# Patient Record
Sex: MALE | Race: BLACK OR AFRICAN AMERICAN | Employment: FULL TIME | URBAN - METROPOLITAN AREA
[De-identification: names, ages, dates, MRNs, and addresses within clinical notes are randomized per-mention and may not be internally consistent; named-entity substitution may affect disease eponyms.]

---

## 2019-09-30 ENCOUNTER — APPOINTMENT (OUTPATIENT)
Dept: GENERAL RADIOLOGY | Age: 44
End: 2019-09-30

## 2019-09-30 ENCOUNTER — HOSPITAL ENCOUNTER (EMERGENCY)
Age: 44
Discharge: HOME | End: 2019-09-30

## 2019-09-30 VITALS
DIASTOLIC BLOOD PRESSURE: 83 MMHG | HEART RATE: 82 BPM | OXYGEN SATURATION: 98 % | SYSTOLIC BLOOD PRESSURE: 142 MMHG | RESPIRATION RATE: 18 BRPM | TEMPERATURE: 98 F

## 2019-09-30 DIAGNOSIS — S46.911A STRAIN OF RIGHT SHOULDER, INITIAL ENCOUNTER: Primary | ICD-10-CM

## 2019-09-30 DIAGNOSIS — S46.001A INJURY OF RIGHT ROTATOR CUFF, INITIAL ENCOUNTER: ICD-10-CM

## 2019-09-30 PROCEDURE — 99283 EMERGENCY DEPT VISIT LOW MDM: CPT

## 2019-09-30 PROCEDURE — 6370000000 HC RX 637 (ALT 250 FOR IP): Performed by: NURSE PRACTITIONER

## 2019-09-30 PROCEDURE — 73030 X-RAY EXAM OF SHOULDER: CPT

## 2019-09-30 RX ORDER — IBUPROFEN 800 MG/1
800 TABLET ORAL EVERY 6 HOURS PRN
Qty: 21 TABLET | Refills: 0 | Status: SHIPPED | OUTPATIENT
Start: 2019-09-30 | End: 2021-09-10

## 2019-09-30 RX ORDER — IBUPROFEN 800 MG/1
800 TABLET ORAL ONCE
Status: COMPLETED | OUTPATIENT
Start: 2019-09-30 | End: 2019-09-30

## 2019-09-30 RX ADMIN — IBUPROFEN 800 MG: 800 TABLET ORAL at 16:42

## 2019-09-30 ASSESSMENT — PAIN SCALES - GENERAL
PAINLEVEL_OUTOF10: 8
PAINLEVEL_OUTOF10: 10

## 2019-10-04 ENCOUNTER — OFFICE VISIT (OUTPATIENT)
Dept: FAMILY MEDICINE CLINIC | Age: 44
End: 2019-10-04

## 2019-10-04 VITALS
OXYGEN SATURATION: 98 % | HEART RATE: 84 BPM | HEIGHT: 74 IN | BODY MASS INDEX: 18.61 KG/M2 | DIASTOLIC BLOOD PRESSURE: 92 MMHG | RESPIRATION RATE: 16 BRPM | WEIGHT: 145 LBS | SYSTOLIC BLOOD PRESSURE: 134 MMHG

## 2019-10-04 DIAGNOSIS — M25.511 CHRONIC RIGHT SHOULDER PAIN: Primary | ICD-10-CM

## 2019-10-04 DIAGNOSIS — G89.29 CHRONIC RIGHT SHOULDER PAIN: Primary | ICD-10-CM

## 2019-10-04 DIAGNOSIS — R03.0 ELEVATED BLOOD PRESSURE READING: ICD-10-CM

## 2019-10-04 PROCEDURE — 99202 OFFICE O/P NEW SF 15 MIN: CPT | Performed by: STUDENT IN AN ORGANIZED HEALTH CARE EDUCATION/TRAINING PROGRAM

## 2019-10-04 ASSESSMENT — ENCOUNTER SYMPTOMS
CONSTIPATION: 0
BACK PAIN: 1
ABDOMINAL PAIN: 0
WHEEZING: 0
BLOOD IN STOOL: 0
VOMITING: 0
SHORTNESS OF BREATH: 0
SINUS PAIN: 0
NAUSEA: 0
COUGH: 0
SORE THROAT: 0
DIARRHEA: 0

## 2019-10-04 ASSESSMENT — PATIENT HEALTH QUESTIONNAIRE - PHQ9
SUM OF ALL RESPONSES TO PHQ QUESTIONS 1-9: 0
2. FEELING DOWN, DEPRESSED OR HOPELESS: 0
SUM OF ALL RESPONSES TO PHQ9 QUESTIONS 1 & 2: 0
SUM OF ALL RESPONSES TO PHQ QUESTIONS 1-9: 0
1. LITTLE INTEREST OR PLEASURE IN DOING THINGS: 0

## 2019-10-15 ENCOUNTER — HOSPITAL ENCOUNTER (OUTPATIENT)
Dept: MRI IMAGING | Age: 44
Discharge: HOME | End: 2019-10-17

## 2019-10-15 DIAGNOSIS — M25.511 CHRONIC RIGHT SHOULDER PAIN: ICD-10-CM

## 2019-10-15 DIAGNOSIS — G89.29 CHRONIC RIGHT SHOULDER PAIN: ICD-10-CM

## 2019-10-15 PROCEDURE — 73221 MRI JOINT UPR EXTREM W/O DYE: CPT

## 2019-10-21 DIAGNOSIS — M75.101 TEAR OF RIGHT SUPRASPINATUS TENDON: Primary | ICD-10-CM

## 2019-11-07 ENCOUNTER — OFFICE VISIT (OUTPATIENT)
Dept: FAMILY MEDICINE CLINIC | Age: 44
End: 2019-11-07

## 2019-11-07 VITALS
SYSTOLIC BLOOD PRESSURE: 130 MMHG | RESPIRATION RATE: 17 BRPM | HEART RATE: 82 BPM | DIASTOLIC BLOOD PRESSURE: 86 MMHG | TEMPERATURE: 98 F | WEIGHT: 151.4 LBS | BODY MASS INDEX: 19.43 KG/M2 | OXYGEN SATURATION: 98 % | HEIGHT: 74 IN

## 2019-11-07 DIAGNOSIS — Z23 NEED FOR INFLUENZA VACCINATION: ICD-10-CM

## 2019-11-07 DIAGNOSIS — Z59.89 DOES NOT HAVE HEALTH INSURANCE: ICD-10-CM

## 2019-11-07 DIAGNOSIS — M75.101 TEAR OF RIGHT SUPRASPINATUS TENDON: Primary | ICD-10-CM

## 2019-11-07 DIAGNOSIS — Z23 NEED FOR TDAP VACCINATION: ICD-10-CM

## 2019-11-07 DIAGNOSIS — Z11.4 ENCOUNTER FOR SCREENING FOR HIV: ICD-10-CM

## 2019-11-07 DIAGNOSIS — Z00.00 HEALTHCARE MAINTENANCE: ICD-10-CM

## 2019-11-07 DIAGNOSIS — Z23 NEED FOR PNEUMOCOCCAL VACCINATION: ICD-10-CM

## 2019-11-07 PROCEDURE — 99213 OFFICE O/P EST LOW 20 MIN: CPT | Performed by: STUDENT IN AN ORGANIZED HEALTH CARE EDUCATION/TRAINING PROGRAM

## 2019-11-07 RX ORDER — IBUPROFEN 800 MG/1
800 TABLET ORAL EVERY 6 HOURS PRN
Qty: 21 TABLET | Refills: 0 | Status: CANCELLED | OUTPATIENT
Start: 2019-11-07

## 2019-11-07 RX ORDER — NAPROXEN SODIUM 220 MG
220 TABLET ORAL 2 TIMES DAILY WITH MEALS
Qty: 60 TABLET | Refills: 0 | Status: SHIPPED
Start: 2019-11-07 | End: 2021-09-10

## 2019-11-07 SDOH — ECONOMIC STABILITY - INCOME SECURITY: OTHER PROBLEMS RELATED TO HOUSING AND ECONOMIC CIRCUMSTANCES: Z59.89

## 2021-09-10 ENCOUNTER — APPOINTMENT (OUTPATIENT)
Dept: GENERAL RADIOLOGY | Age: 46
End: 2021-09-10
Payer: MEDICAID

## 2021-09-10 ENCOUNTER — HOSPITAL ENCOUNTER (EMERGENCY)
Age: 46
Discharge: HOME | End: 2021-09-10
Attending: EMERGENCY MEDICINE
Payer: MEDICAID

## 2021-09-10 VITALS
WEIGHT: 175 LBS | BODY MASS INDEX: 23.19 KG/M2 | HEIGHT: 73 IN | OXYGEN SATURATION: 100 % | SYSTOLIC BLOOD PRESSURE: 139 MMHG | RESPIRATION RATE: 18 BRPM | DIASTOLIC BLOOD PRESSURE: 79 MMHG | TEMPERATURE: 97.4 F | HEART RATE: 72 BPM

## 2021-09-10 DIAGNOSIS — R07.9 CHEST PAIN, UNSPECIFIED TYPE: Primary | ICD-10-CM

## 2021-09-10 DIAGNOSIS — Z20.822 COVID-19 VIRUS NOT DETECTED: ICD-10-CM

## 2021-09-10 LAB
ALBUMIN SERPL-MCNC: 4.3 G/DL (ref 3.5–5.2)
ALP BLD-CCNC: 114 U/L (ref 40–129)
ALT SERPL-CCNC: 30 U/L (ref 0–40)
ANION GAP SERPL CALCULATED.3IONS-SCNC: 13 MMOL/L (ref 7–16)
AST SERPL-CCNC: 22 U/L (ref 0–39)
BASOPHILS ABSOLUTE: 0.02 E9/L (ref 0–0.2)
BASOPHILS RELATIVE PERCENT: 0.2 % (ref 0–2)
BILIRUB SERPL-MCNC: 0.7 MG/DL (ref 0–1.2)
BUN BLDV-MCNC: 11 MG/DL (ref 6–20)
CALCIUM SERPL-MCNC: 9.2 MG/DL (ref 8.6–10.2)
CHLORIDE BLD-SCNC: 101 MMOL/L (ref 98–107)
CO2: 23 MMOL/L (ref 22–29)
CREAT SERPL-MCNC: 1 MG/DL (ref 0.7–1.2)
D DIMER: <200 NG/ML DDU
EKG ATRIAL RATE: 82 BPM
EKG P AXIS: 80 DEGREES
EKG P-R INTERVAL: 112 MS
EKG Q-T INTERVAL: 374 MS
EKG QRS DURATION: 78 MS
EKG QTC CALCULATION (BAZETT): 436 MS
EKG R AXIS: 76 DEGREES
EKG T AXIS: 5 DEGREES
EKG VENTRICULAR RATE: 82 BPM
EOSINOPHILS ABSOLUTE: 0.29 E9/L (ref 0.05–0.5)
EOSINOPHILS RELATIVE PERCENT: 3.4 % (ref 0–6)
GFR AFRICAN AMERICAN: >60
GFR NON-AFRICAN AMERICAN: >60 ML/MIN/1.73
GLUCOSE BLD-MCNC: 99 MG/DL (ref 74–99)
HCT VFR BLD CALC: 48.7 % (ref 37–54)
HEMOGLOBIN: 16.1 G/DL (ref 12.5–16.5)
IMMATURE GRANULOCYTES #: 0.03 E9/L
IMMATURE GRANULOCYTES %: 0.4 % (ref 0–5)
LYMPHOCYTES ABSOLUTE: 1.31 E9/L (ref 1.5–4)
LYMPHOCYTES RELATIVE PERCENT: 15.4 % (ref 20–42)
MCH RBC QN AUTO: 28.1 PG (ref 26–35)
MCHC RBC AUTO-ENTMCNC: 33.1 % (ref 32–34.5)
MCV RBC AUTO: 85.1 FL (ref 80–99.9)
MONOCYTES ABSOLUTE: 0.67 E9/L (ref 0.1–0.95)
MONOCYTES RELATIVE PERCENT: 7.9 % (ref 2–12)
NEUTROPHILS ABSOLUTE: 6.21 E9/L (ref 1.8–7.3)
NEUTROPHILS RELATIVE PERCENT: 72.7 % (ref 43–80)
PDW BLD-RTO: 12.9 FL (ref 11.5–15)
PLATELET # BLD: 306 E9/L (ref 130–450)
PMV BLD AUTO: 10.1 FL (ref 7–12)
POTASSIUM SERPL-SCNC: 4 MMOL/L (ref 3.5–5)
PRO-BNP: 15 PG/ML (ref 0–125)
RBC # BLD: 5.72 E12/L (ref 3.8–5.8)
SARS-COV-2, NAAT: NOT DETECTED
SODIUM BLD-SCNC: 137 MMOL/L (ref 132–146)
TOTAL PROTEIN: 7.8 G/DL (ref 6.4–8.3)
TROPONIN, HIGH SENSITIVITY: <6 NG/L (ref 0–11)
TROPONIN, HIGH SENSITIVITY: <6 NG/L (ref 0–11)
WBC # BLD: 8.5 E9/L (ref 4.5–11.5)

## 2021-09-10 PROCEDURE — 99284 EMERGENCY DEPT VISIT MOD MDM: CPT

## 2021-09-10 PROCEDURE — 80053 COMPREHEN METABOLIC PANEL: CPT

## 2021-09-10 PROCEDURE — 96374 THER/PROPH/DIAG INJ IV PUSH: CPT

## 2021-09-10 PROCEDURE — 93005 ELECTROCARDIOGRAM TRACING: CPT | Performed by: EMERGENCY MEDICINE

## 2021-09-10 PROCEDURE — 93010 ELECTROCARDIOGRAM REPORT: CPT | Performed by: INTERNAL MEDICINE

## 2021-09-10 PROCEDURE — 87635 SARS-COV-2 COVID-19 AMP PRB: CPT

## 2021-09-10 PROCEDURE — 85025 COMPLETE CBC W/AUTO DIFF WBC: CPT

## 2021-09-10 PROCEDURE — 71045 X-RAY EXAM CHEST 1 VIEW: CPT

## 2021-09-10 PROCEDURE — 84484 ASSAY OF TROPONIN QUANT: CPT

## 2021-09-10 PROCEDURE — 85378 FIBRIN DEGRADE SEMIQUANT: CPT

## 2021-09-10 PROCEDURE — 6370000000 HC RX 637 (ALT 250 FOR IP): Performed by: EMERGENCY MEDICINE

## 2021-09-10 PROCEDURE — 83880 ASSAY OF NATRIURETIC PEPTIDE: CPT

## 2021-09-10 PROCEDURE — 6360000002 HC RX W HCPCS: Performed by: EMERGENCY MEDICINE

## 2021-09-10 RX ORDER — ASPIRIN 81 MG/1
324 TABLET, CHEWABLE ORAL ONCE
Status: COMPLETED | OUTPATIENT
Start: 2021-09-10 | End: 2021-09-10

## 2021-09-10 RX ORDER — NAPROXEN 500 MG/1
500 TABLET ORAL 2 TIMES DAILY PRN
Qty: 28 TABLET | Refills: 0 | Status: SHIPPED | OUTPATIENT
Start: 2021-09-10 | End: 2022-07-06

## 2021-09-10 RX ORDER — KETOROLAC TROMETHAMINE 30 MG/ML
15 INJECTION, SOLUTION INTRAMUSCULAR; INTRAVENOUS ONCE
Status: COMPLETED | OUTPATIENT
Start: 2021-09-10 | End: 2021-09-10

## 2021-09-10 RX ADMIN — KETOROLAC TROMETHAMINE 15 MG: 30 INJECTION, SOLUTION INTRAMUSCULAR; INTRAVENOUS at 09:18

## 2021-09-10 RX ADMIN — ASPIRIN 81 MG CHEWABLE TABLET 324 MG: 81 TABLET CHEWABLE at 06:49

## 2021-09-10 ASSESSMENT — PAIN DESCRIPTION - ORIENTATION: ORIENTATION: MID

## 2021-09-10 ASSESSMENT — PAIN SCALES - GENERAL
PAINLEVEL_OUTOF10: 8
PAINLEVEL_OUTOF10: 8

## 2021-09-10 ASSESSMENT — PAIN DESCRIPTION - LOCATION: LOCATION: CHEST

## 2021-09-10 ASSESSMENT — PAIN DESCRIPTION - PAIN TYPE: TYPE: ACUTE PAIN

## 2021-09-10 ASSESSMENT — PAIN DESCRIPTION - FREQUENCY: FREQUENCY: CONTINUOUS

## 2021-09-10 NOTE — ED NOTES
Bed: 12  Expected date:   Expected time:   Means of arrival:   Comments:  triage     Eddie So RN  09/10/21 6001

## 2021-09-10 NOTE — ED PROVIDER NOTES
record.        ---------------------------------------------------PHYSICAL EXAM--------------------------------------    Constitutional/General: Alert and oriented x3  Head: Normocephalic and atraumatic  Eyes: PERRL, EOMI, sclera non icteric  Mouth: Oropharynx clear, handling secretions, no trismus, no asymmetry of the posterior oropharynx or uvular edema  Neck: Supple, full ROM, no stridor, no meningeal signs no JVD   Respiratory: Lungs clear to auscultation bilaterally,Not in respiratory distress  Cardiovascular:  Regular rate. Regular rhythm. 2+ distal pulses. Equal extremity pulses. Chest: No chest wall tenderness  GI:  Abdomen Soft, Non tender, Non distended. No rebound, guarding, or rigidity. Musculoskeletal: Moves all extremities x 4. Warm and well perfused, no clubbing, cyanosis, or edema. Capillary refill <3 seconds  Integument: skin warm and dry. No rashes. There is no pretibial edema nor calf tenderness bilaterally     Neurologic: GCS 15, no focal deficits, symmetric strength 5/5 in the upper and lower extremities bilaterally  Psychiatric: Normal Affect      EKG: Interpreted by emergency department physician, Dr. Asif Murphy   This EKG is signed and interpreted by me. Rate: 82  Rhythm: Sinus  Interpretation: Sinus rhythm, normal axis, borderline left atrial enlargement. VA is 112, QRS is 78, QTc is 436. Nonspecific ST changes. There is no evidence of ST elevation  Comparison: no previous EKG available      -------------------------------------------------- RESULTS -------------------------------------------------  I have personally reviewed all laboratory and imaging results for this patient. Results are listed below.      LABS: (Lab results interpreted by me)  Results for orders placed or performed during the hospital encounter of 09/10/21   COVID-19, Rapid    Specimen: Nasopharyngeal Swab   Result Value Ref Range    SARS-CoV-2, NAAT Not Detected Not Detected   Troponin   Result Value Ref Range Troponin, High Sensitivity <6 0 - 11 ng/L   CBC Auto Differential   Result Value Ref Range    WBC 8.5 4.5 - 11.5 E9/L    RBC 5.72 3.80 - 5.80 E12/L    Hemoglobin 16.1 12.5 - 16.5 g/dL    Hematocrit 48.7 37.0 - 54.0 %    MCV 85.1 80.0 - 99.9 fL    MCH 28.1 26.0 - 35.0 pg    MCHC 33.1 32.0 - 34.5 %    RDW 12.9 11.5 - 15.0 fL    Platelets 057 725 - 121 E9/L    MPV 10.1 7.0 - 12.0 fL    Neutrophils % 72.7 43.0 - 80.0 %    Immature Granulocytes % 0.4 0.0 - 5.0 %    Lymphocytes % 15.4 (L) 20.0 - 42.0 %    Monocytes % 7.9 2.0 - 12.0 %    Eosinophils % 3.4 0.0 - 6.0 %    Basophils % 0.2 0.0 - 2.0 %    Neutrophils Absolute 6.21 1.80 - 7.30 E9/L    Immature Granulocytes # 0.03 E9/L    Lymphocytes Absolute 1.31 (L) 1.50 - 4.00 E9/L    Monocytes Absolute 0.67 0.10 - 0.95 E9/L    Eosinophils Absolute 0.29 0.05 - 0.50 E9/L    Basophils Absolute 0.02 0.00 - 0.20 E9/L   Comprehensive Metabolic Panel   Result Value Ref Range    Sodium 137 132 - 146 mmol/L    Potassium 4.0 3.5 - 5.0 mmol/L    Chloride 101 98 - 107 mmol/L    CO2 23 22 - 29 mmol/L    Anion Gap 13 7 - 16 mmol/L    Glucose 99 74 - 99 mg/dL    BUN 11 6 - 20 mg/dL    CREATININE 1.0 0.7 - 1.2 mg/dL    GFR Non-African American >60 >=60 mL/min/1.73    GFR African American >60     Calcium 9.2 8.6 - 10.2 mg/dL    Total Protein 7.8 6.4 - 8.3 g/dL    Albumin 4.3 3.5 - 5.2 g/dL    Total Bilirubin 0.7 0.0 - 1.2 mg/dL    Alkaline Phosphatase 114 40 - 129 U/L    ALT 30 0 - 40 U/L    AST 22 0 - 39 U/L   Brain Natriuretic Peptide   Result Value Ref Range    Pro-BNP 15 0 - 125 pg/mL   D-Dimer, Quantitative   Result Value Ref Range    D-Dimer, Quant <200 ng/mL DDU   Troponin   Result Value Ref Range    Troponin, High Sensitivity <6 0 - 11 ng/L   EKG 12 Lead   Result Value Ref Range    Ventricular Rate 82 BPM    Atrial Rate 82 BPM    P-R Interval 112 ms    QRS Duration 78 ms    Q-T Interval 374 ms    QTc Calculation (Bazett) 436 ms    P Axis 80 degrees    R Axis 76 degrees    T Axis 5 degrees   ,       RADIOLOGY:  Interpreted by Radiologist unless otherwise specified  XR CHEST PORTABLE   Final Result   Normal chest.                          ------------------------- NURSING NOTES AND VITALS REVIEWED ---------------------------   The nursing notes within the ED encounter and vital signs as below have been reviewed by myself  /79   Pulse 72   Temp 97.4 °F (36.3 °C)   Resp 18   Ht 6' 1\" (1.854 m)   Wt 175 lb (79.4 kg)   SpO2 100%   BMI 23.09 kg/m²     Oxygen Saturation Interpretation: Normal    The cardiac monitor revealed NSR with a heart rate in the 80s as interpreted by me. The cardiac monitor was ordered secondary to the patient's heart rate and to monitor the patient for dysrhythmia. CPT 89251    The patients available past medical records and past encounters were reviewed. ------------------------------ ED COURSE/MEDICAL DECISION MAKING----------------------  Medications   aspirin chewable tablet 324 mg (324 mg Oral Given 9/10/21 2900)   ketorolac (TORADOL) injection 15 mg (15 mg IntraVENous Given 9/10/21 5503)                    Medical Decision Making:     I, Dr. Beuford Cockayne am the primary provider of record    Work-up undertaken. No acute process identified. First troponin undetectable. He was held for delta troponin remained undetectable. States he felt better after Toradol. Will discharge home with anti-inflammatories and outpatient follow-up have return for seen signs or symptoms. Re-Evaluations:          Re-evaluation. Patients symptoms are improving  Repeat physical examination is improved        This patient's ED course included: a personal history and physicial examination, re-evaluation prior to disposition, IV medications, cardiac monitoring, continuous pulse oximetry and complex medical decision making and emergency management    This patient has remained hemodynamically stable during their ED course. Counseling:    The emergency provider has spoken with the patient and discussed todays results, in addition to providing specific details for the plan of care and counseling regarding the diagnosis and prognosis. Questions are answered at this time and they are agreeable with the plan.       --------------------------------- IMPRESSION AND DISPOSITION ---------------------------------    IMPRESSION  1. Chest pain, unspecified type    2. COVID-19 virus not detected        DISPOSITION  Disposition: Discharge to home  Patient condition is stable        NOTE: This report was transcribed using voice recognition software.  Every effort was made to ensure accuracy; however, inadvertent computerized transcription errors may be present       Debi Stephens DO  09/10/21 9544

## 2021-09-16 ENCOUNTER — HOSPITAL ENCOUNTER (EMERGENCY)
Age: 46
Discharge: HOME | End: 2021-09-16
Payer: MEDICAID

## 2021-09-16 VITALS
OXYGEN SATURATION: 98 % | DIASTOLIC BLOOD PRESSURE: 83 MMHG | SYSTOLIC BLOOD PRESSURE: 151 MMHG | HEART RATE: 87 BPM | RESPIRATION RATE: 18 BRPM | TEMPERATURE: 98.6 F

## 2021-09-16 DIAGNOSIS — L30.9 DERMATITIS: Primary | ICD-10-CM

## 2021-09-16 PROCEDURE — 99283 EMERGENCY DEPT VISIT LOW MDM: CPT

## 2021-09-16 PROCEDURE — 6370000000 HC RX 637 (ALT 250 FOR IP): Performed by: NURSE PRACTITIONER

## 2021-09-16 RX ORDER — DIPHENHYDRAMINE HCL 25 MG
25 TABLET ORAL ONCE
Status: COMPLETED | OUTPATIENT
Start: 2021-09-16 | End: 2021-09-16

## 2021-09-16 RX ORDER — PREDNISONE 10 MG/1
20 TABLET ORAL DAILY
Qty: 10 TABLET | Refills: 0 | Status: SHIPPED | OUTPATIENT
Start: 2021-09-16 | End: 2021-09-21

## 2021-09-16 RX ORDER — PREDNISONE 20 MG/1
20 TABLET ORAL ONCE
Status: COMPLETED | OUTPATIENT
Start: 2021-09-16 | End: 2021-09-16

## 2021-09-16 RX ADMIN — PREDNISONE 20 MG: 20 TABLET ORAL at 23:56

## 2021-09-16 RX ADMIN — DIPHENHYDRAMINE HCL 25 MG: 25 TABLET ORAL at 23:56

## 2021-09-17 NOTE — ED PROVIDER NOTES
[address]  Department of Emergency Medicine   ED  Encounter Note  Admit Date/RoomTime: 2021 11:44 PM  ED Room: [facility]/ST-1    NAME: Lizz Garay  : 1975  MRN: [id number]     Chief Complaint:  Rash    History of Present Illness       Lizz Garay is a 51 y.o. old male who presents to the emergency department by private vehicle, for persistent of red, raised and itchy area on bilateral upper extremities and back which began 1 week(s) prior to arrival.  The symptoms were caused by unknown cause. Since onset the symptoms have been persistent. Prior history of similar episodes: No.   His symptoms are associated with nothing additional and relieved by nothing. He denies any headache, fever, chills, neck pain, sore throat, chest pain, shortness of breath, cough, abdominal pain, nausea, vomiting, diarrhea, constipation, or dysuria. ROS   Pertinent positives and negatives are stated within HPI, all other systems reviewed and are negative. Past Medical History:  has a past medical history of Bimalleolar fracture of left ankle. Surgical History:  has a past surgical history that includes Ankle fracture surgery (Left, ) and fracture surgery (14). Social History:  reports that he has been smoking cigars. He has a 2.50 pack-year smoking history. He has never used smokeless tobacco. He reports that he does not drink alcohol and does not use drugs. Family History: family history is not on file. Allergies: Patient has no allergy information on record. Physical Exam   Oxygen Saturation Interpretation: Normal.        ED Triage Vitals [21 2338]   BP Temp Temp src Pulse Resp SpO2 Height Weight   -- 98.6 °F (37 °C) -- 87 18 98 % -- --         Constitutional:  Alert, development consistent with age. HEENT:  NC/NT. Airway patent. Eyes:  PERRL, EOMI, no discharge. Ears:  TMs without perforation, injection, or bulging.   External canals clear without exudate. Mouth:  Mucous membranes moist without lesions, tongue and gums normal.  Throat:  Pharynx without injection, exudate, or tonsillar hypertrophy. Airway patient. Neck:  Supple. No lymphadenopathy. Respiratory:  Clear to auscultation and breath sounds equal.  CV:  Regular rate and rhythm. GI:  Abdomen Soft, nontender, +BS. Integument:  Skin turgor: Normal.              Scattered mildly erythemic nontender maculopapules on the bilateral upper extremities and back with no surrounding erythema or edema. There is no drainage or target lesions. Neurological:  Orientation age-appropriate unless noted elseware. Motor functions intact. Lab / Imaging Results   (All laboratory and radiology results have been personally reviewed by myself)  Labs:  No results found for this visit on 09/16/21. Imaging: All Radiology results interpreted by Radiologist unless otherwise noted. No orders to display       ED Course / Medical Decision Making     Medications   predniSONE (DELTASONE) tablet 20 mg (has no administration in time range)   diphenhydrAMINE (BENADRYL) tablet 25 mg (has no administration in time range)        Consults:   None    Procedures:   none    MDM:   Patient has a clinical presentation of a nonspecific dermatitis. He appears well, nontoxic, and comfortable. He has no signs of anaphylaxis or systemic illness. He is initiated on a prednisone burst and given Benadryl in the emergency department. He is appropriate for discharge outpatient follow-up. He is instructed to return emergency department with any new or worsening symptoms. Plan of Care/Counseling:  SEVERINO Marie CNP reviewed today's visit with the patient in addition to providing specific details for the plan of care and counseling regarding the diagnosis and prognosis. Questions are answered at this time and are agreeable with the plan. Assessment      1. Dermatitis      Plan   Discharged home.   Patient condition is good    New Medications     New Prescriptions    PREDNISONE (DELTASONE) 10 MG TABLET    Take 2 tablets by mouth daily for 5 days     Electronically signed by SEVERINO Garcia CNP   DD: 9/16/21  **This report was transcribed using voice recognition software. Every effort was made to ensure accuracy; however, inadvertent computerized transcription errors may be present.   END OF ED PROVIDER NOTE       SEVERINO Sandra CNP  09/16/21 3567

## 2022-07-06 ENCOUNTER — HOSPITAL ENCOUNTER (EMERGENCY)
Age: 47
Discharge: HOME | End: 2022-07-06
Payer: MEDICAID

## 2022-07-06 VITALS
TEMPERATURE: 98.1 F | RESPIRATION RATE: 18 BRPM | SYSTOLIC BLOOD PRESSURE: 128 MMHG | DIASTOLIC BLOOD PRESSURE: 68 MMHG | HEART RATE: 68 BPM | OXYGEN SATURATION: 98 %

## 2022-07-06 DIAGNOSIS — G89.29 ACUTE EXACERBATION OF CHRONIC LOW BACK PAIN: ICD-10-CM

## 2022-07-06 DIAGNOSIS — M25.511 CHRONIC RIGHT SHOULDER PAIN: ICD-10-CM

## 2022-07-06 DIAGNOSIS — M75.101 TEAR OF RIGHT SUPRASPINATUS TENDON: Primary | ICD-10-CM

## 2022-07-06 DIAGNOSIS — G89.29 CHRONIC RIGHT SHOULDER PAIN: ICD-10-CM

## 2022-07-06 DIAGNOSIS — M54.50 ACUTE EXACERBATION OF CHRONIC LOW BACK PAIN: ICD-10-CM

## 2022-07-06 PROCEDURE — 6370000000 HC RX 637 (ALT 250 FOR IP): Performed by: NURSE PRACTITIONER

## 2022-07-06 PROCEDURE — 99283 EMERGENCY DEPT VISIT LOW MDM: CPT

## 2022-07-06 RX ORDER — OXYCODONE HYDROCHLORIDE AND ACETAMINOPHEN 5; 325 MG/1; MG/1
1 TABLET ORAL ONCE
Status: COMPLETED | OUTPATIENT
Start: 2022-07-06 | End: 2022-07-06

## 2022-07-06 RX ORDER — CYCLOBENZAPRINE HCL 10 MG
10 TABLET ORAL ONCE
Status: COMPLETED | OUTPATIENT
Start: 2022-07-06 | End: 2022-07-06

## 2022-07-06 RX ORDER — LIDOCAINE 50 MG/G
1 PATCH TOPICAL DAILY
Qty: 10 PATCH | Refills: 0 | Status: SHIPPED | OUTPATIENT
Start: 2022-07-06 | End: 2022-07-16

## 2022-07-06 RX ORDER — METHOCARBAMOL 500 MG/1
500 TABLET, FILM COATED ORAL 4 TIMES DAILY
Qty: 40 TABLET | Refills: 0 | Status: SHIPPED | OUTPATIENT
Start: 2022-07-06 | End: 2022-07-16

## 2022-07-06 RX ORDER — NAPROXEN 500 MG/1
500 TABLET ORAL 2 TIMES DAILY PRN
Qty: 28 TABLET | Refills: 0 | Status: SHIPPED | OUTPATIENT
Start: 2022-07-06 | End: 2022-07-30

## 2022-07-06 RX ADMIN — OXYCODONE AND ACETAMINOPHEN 1 TABLET: 5; 325 TABLET ORAL at 02:48

## 2022-07-06 RX ADMIN — CYCLOBENZAPRINE 10 MG: 10 TABLET, FILM COATED ORAL at 02:48

## 2022-07-06 ASSESSMENT — PAIN SCALES - GENERAL: PAINLEVEL_OUTOF10: 10

## 2022-07-06 NOTE — ED PROVIDER NOTES
Independent     HPI: Sheeba Vick  51 y.o. male with a past medical history of   Past Medical History:   Diagnosis Date    Bimalleolar fracture of left ankle 9/4/2014     Presents with a bilateral lower back pain. The patient states that he has a long-standing history of chronic back pain. There is no apparent mechanism of injury for this ED Presentation. The time course of the symptoms were reported as gradual. The quality of the pain is aching. The severity of the symptoms are moderate. The patient denies any numbness, motor weakness, bowel incontinence, or bladder incontinence. The symptoms are exacerbated by movement. Patient states that the symptoms are relieved by nothing. The patient denies any history of fever. The patient denies a history of IV drug use or other high risk activities leading to epidural abscess. The patient denies any trauma. Patient presents emergency department with return of worsening lumbar back pain as well as right shoulder pain. Patient denies any new injury or trauma. Patient denies any unusual numbness or tingling to his upper or lower extremities as well as no unusual urinary or stool incontinence or paresthesia. Patient with history of right shoulder supraspinatus tear. He has had an MRI in the past and actually was scheduled to see orthopedic physician in he actually did see him back in 2017 but has not followed up since recent years. Patient reports taken over-the-counter meds without effect. Patient otherwise neurovascular intact states he worsened it by working over the weekend. Symptoms mild in severity and persistent      Review of Systems:   Pertinent positives and negatives are stated within HPI, all other systems reviewed and are negative.          --------------------------------------------- PAST HISTORY ---------------------------------------------  Past Medical History:  has a past medical history of Bimalleolar fracture of left ankle.     Past Surgical History:  has a past surgical history that includes Ankle fracture surgery (Left, 2011) and fracture surgery (9-18-14). Social History:  reports that he has been smoking cigars. He has a 2.50 pack-year smoking history. He has never used smokeless tobacco. He reports that he does not drink alcohol and does not use drugs. Family History: family history is not on file. The patients home medications have been reviewed. Allergies: Patient has no known allergies. ------------------------- NURSING NOTES AND VITALS REVIEWED ---------------------------   The nursing notes within the ED encounter and vital signs as below have been reviewed by myself. /68   Pulse 68   Temp 98.1 °F (36.7 °C) (Oral)   Resp 18   SpO2 98%   Oxygen Saturation Interpretation: Normal    The patients available past medical records and past encounters were reviewed.               Physical exam:  Constitutional: Vital signs were reviewed. The patient is comfortable. Patient is alert and oriented x3. Head: Head is atraumatic and normocephalic. Eyes: There is no discharge from the eyes. Sclerae are normal.  ENT: The oropharynx is normal. The mouth is normal to inspection. Neck: Normal range of motion of the neck is present there is no JVD present no meningeal signs are present. Respiratory/chest: The chest is nontender breath sounds are normal  Cardiovascular: Regular rate and rhythm is noted. No murmurs. No rubs or gallops. Skin: Skin is warm and dry, Skin exam normal  Neurologic exam: The patient's [place] Coma Scale is 15. No focal motor deficits. There are no focal sensory deficits. Deep tendon reflexes are intact and present bilaterally in the lower extremities. Babinski is absent. Back exam: The patient has reproducible tenderness to palpation in the paravertebral lumbar region. There is no evidence of localized erythema nor is there any focal warmth to the back.  The patient has no evidence of single vertebral tenderness or any single area of interspace tenderness. Point tenderness to right anterior shoulder pain with any attempts to perform straight arm raise. Right radial pulse 2+. Grasp 5 out of 5. No joint erythema warmth or swelling noted on exam to the right shoulder.        -------------------------------------------------- RESULTS -------------------------------------------------  I have personally reviewed all laboratory and imaging results for this patient. Results are listed below. LABS:  No results found for this visit on 07/06/22. RADIOLOGY:  Interpreted by Radiologist.  No orders to display       During Your Evaluation in the Emergency Department Preston Gifford  received the following medication(s):    Medications   oxyCODONE-acetaminophen (PERCOCET) 5-325 MG per tablet 1 tablet (1 tablet Oral Given 7/6/22 0248)   cyclobenzaprine (FLEXERIL) tablet 10 mg (10 mg Oral Given 7/6/22 0248)       Medical decision making:  Acute on chronic low back pain. No evidence of cord compression nor is there any evidence of infection consistent with discitis or epidural abscess.     Plan:  Review of past medical records for appropriate pain control and outpatient referral.     Patient will be discharged home he was provided with prescriptions for pain relief, he was educated on the portance of following up with orthopedic physician for medical management regarding the tear. Patient overall neurovascular intact. Patient expressed understanding. Patient will be safely discharged home.       --------------------------------- IMPRESSION AND DISPOSITION ---------------------------------    IMPRESSION  1. Tear of right supraspinatus tendon    2. Acute exacerbation of chronic low back pain    3.  Chronic right shoulder pain        DISPOSITION  Disposition: Discharge to home  Patient condition is good         SEVERINO Hale CNP  07/06/22 8658  ATTENDING PROVIDER ATTESTATION:     Supervising Physician, on-site, available for consultation, non-participatory in the evaluation or care of this patient       Viet Pelletier MD  07/06/22 3526

## 2022-07-30 ENCOUNTER — APPOINTMENT (OUTPATIENT)
Dept: GENERAL RADIOLOGY | Age: 47
DRG: 137 | End: 2022-07-30
Payer: MEDICAID

## 2022-07-30 ENCOUNTER — HOSPITAL ENCOUNTER (INPATIENT)
Age: 47
LOS: 5 days | Discharge: HOME | DRG: 137 | End: 2022-08-04
Attending: EMERGENCY MEDICINE | Admitting: FAMILY MEDICINE
Payer: MEDICAID

## 2022-07-30 ENCOUNTER — APPOINTMENT (OUTPATIENT)
Dept: CT IMAGING | Age: 47
DRG: 137 | End: 2022-07-30
Payer: MEDICAID

## 2022-07-30 DIAGNOSIS — T17.908A ASPIRATION INTO AIRWAY, INITIAL ENCOUNTER: ICD-10-CM

## 2022-07-30 DIAGNOSIS — R40.2432 GLASGOW COMA SCALE TOTAL SCORE 3-8, AT ARRIVAL TO EMERGENCY DEPARTMENT (HCC): Primary | ICD-10-CM

## 2022-07-30 DIAGNOSIS — J69.0 ASPIRATION PNEUMONIA OF RIGHT LOWER LOBE, UNSPECIFIED ASPIRATION PNEUMONIA TYPE (HCC): ICD-10-CM

## 2022-07-30 DIAGNOSIS — R41.82 ALTERED MENTAL STATUS, UNSPECIFIED ALTERED MENTAL STATUS TYPE: ICD-10-CM

## 2022-07-30 DIAGNOSIS — U07.1 COVID-19: ICD-10-CM

## 2022-07-30 DIAGNOSIS — J96.01 ACUTE RESPIRATORY FAILURE WITH HYPOXIA (HCC): ICD-10-CM

## 2022-07-30 PROBLEM — J96.00 ACUTE RESPIRATORY FAILURE (HCC): Status: ACTIVE | Noted: 2022-07-30

## 2022-07-30 LAB
AADO2: 388 MMHG
ACETAMINOPHEN LEVEL: <5 MCG/ML (ref 10–30)
ADENOVIRUS BY PCR: NOT DETECTED
ALBUMIN SERPL-MCNC: 3.5 G/DL (ref 3.5–5.2)
ALBUMIN SERPL-MCNC: 4.3 G/DL (ref 3.5–5.2)
ALP BLD-CCNC: 104 U/L (ref 40–129)
ALP BLD-CCNC: 111 U/L (ref 40–129)
ALT SERPL-CCNC: 36 U/L (ref 0–40)
ALT SERPL-CCNC: 39 U/L (ref 0–40)
AMPHETAMINE SCREEN, URINE: NOT DETECTED
ANION GAP SERPL CALCULATED.3IONS-SCNC: 14 MMOL/L (ref 7–16)
ANION GAP SERPL CALCULATED.3IONS-SCNC: 9 MMOL/L (ref 7–16)
ANISOCYTOSIS: ABNORMAL
APTT: 32.4 SEC (ref 24.5–35.1)
AST SERPL-CCNC: 29 U/L (ref 0–39)
AST SERPL-CCNC: 34 U/L (ref 0–39)
B.E.: -2.7 MMOL/L (ref -3–3)
B.E.: 0.7 MMOL/L (ref -3–3)
BACTERIA: ABNORMAL /HPF
BARBITURATE SCREEN URINE: NOT DETECTED
BASOPHILS ABSOLUTE: 0 E9/L (ref 0–0.2)
BASOPHILS RELATIVE PERCENT: 0.3 % (ref 0–2)
BENZODIAZEPINE SCREEN, URINE: POSITIVE
BILIRUB SERPL-MCNC: 0.3 MG/DL (ref 0–1.2)
BILIRUB SERPL-MCNC: 0.3 MG/DL (ref 0–1.2)
BILIRUBIN URINE: NEGATIVE
BLOOD, URINE: NEGATIVE
BORDETELLA PARAPERTUSSIS BY PCR: NOT DETECTED
BORDETELLA PERTUSSIS BY PCR: NOT DETECTED
BUN BLDV-MCNC: 10 MG/DL (ref 6–20)
BUN BLDV-MCNC: 9 MG/DL (ref 6–20)
C-REACTIVE PROTEIN: 0.3 MG/DL (ref 0–0.4)
CALCIUM SERPL-MCNC: 7.9 MG/DL (ref 8.6–10.2)
CALCIUM SERPL-MCNC: 9.7 MG/DL (ref 8.6–10.2)
CANNABINOID SCREEN URINE: POSITIVE
CHLAMYDOPHILIA PNEUMONIAE BY PCR: NOT DETECTED
CHLORIDE BLD-SCNC: 103 MMOL/L (ref 98–107)
CHLORIDE BLD-SCNC: 104 MMOL/L (ref 98–107)
CHP ED QC CHECK: NORMAL
CLARITY: CLEAR
CO2: 23 MMOL/L (ref 22–29)
CO2: 23 MMOL/L (ref 22–29)
COCAINE METABOLITE SCREEN URINE: NOT DETECTED
COHB: 0.7 % (ref 0–1.5)
COHB: 2.9 % (ref 0–1.5)
COLOR: YELLOW
CORONAVIRUS 229E BY PCR: NOT DETECTED
CORONAVIRUS HKU1 BY PCR: NOT DETECTED
CORONAVIRUS NL63 BY PCR: NOT DETECTED
CORONAVIRUS OC43 BY PCR: NOT DETECTED
CREAT SERPL-MCNC: 0.8 MG/DL (ref 0.7–1.2)
CREAT SERPL-MCNC: 1 MG/DL (ref 0.7–1.2)
CRITICAL: ABNORMAL
CRITICAL: ABNORMAL
D DIMER: <200 NG/ML DDU
DATE ANALYZED: ABNORMAL
DATE ANALYZED: ABNORMAL
DATE OF COLLECTION: ABNORMAL
DATE OF COLLECTION: ABNORMAL
EOSINOPHILS ABSOLUTE: 0.11 E9/L (ref 0.05–0.5)
EOSINOPHILS RELATIVE PERCENT: 1.7 % (ref 0–6)
ETHANOL: <10 MG/DL (ref 0–0.08)
FENTANYL SCREEN, URINE: NOT DETECTED
FERRITIN: 265 NG/ML
FIBRINOGEN: 373 MG/DL (ref 200–400)
FIO2: 100 %
GFR AFRICAN AMERICAN: >60
GFR AFRICAN AMERICAN: >60
GFR NON-AFRICAN AMERICAN: >60 ML/MIN/1.73
GFR NON-AFRICAN AMERICAN: >60 ML/MIN/1.73
GLUCOSE BLD-MCNC: 79 MG/DL (ref 74–99)
GLUCOSE BLD-MCNC: 99 MG/DL (ref 74–99)
GLUCOSE URINE: NEGATIVE MG/DL
HCO3: 21.9 MMOL/L (ref 22–26)
HCO3: 23.2 MMOL/L (ref 22–26)
HCT VFR BLD CALC: 45.8 % (ref 37–54)
HEMOGLOBIN: 15.3 G/DL (ref 12.5–16.5)
HHB: 0.1 % (ref 0–5)
HHB: 0.5 % (ref 0–5)
HUMAN METAPNEUMOVIRUS BY PCR: NOT DETECTED
HUMAN RHINOVIRUS/ENTEROVIRUS BY PCR: NOT DETECTED
INFLUENZA A BY PCR: NOT DETECTED
INFLUENZA B BY PCR: NOT DETECTED
INR BLD: 1.3
KETONES, URINE: ABNORMAL MG/DL
LAB: ABNORMAL
LAB: ABNORMAL
LACTATE DEHYDROGENASE: 177 U/L (ref 135–225)
LACTIC ACID, SEPSIS: 0.6 MMOL/L (ref 0.5–1.9)
LACTIC ACID, SEPSIS: 1.4 MMOL/L (ref 0.5–1.9)
LEUKOCYTE ESTERASE, URINE: ABNORMAL
LYMPHOCYTES ABSOLUTE: 0.2 E9/L (ref 1.5–4)
LYMPHOCYTES RELATIVE PERCENT: 2.6 % (ref 20–42)
Lab: ABNORMAL
MAGNESIUM: 1.8 MG/DL (ref 1.6–2.6)
MAGNESIUM: 1.8 MG/DL (ref 1.6–2.6)
MCH RBC QN AUTO: 28.9 PG (ref 26–35)
MCHC RBC AUTO-ENTMCNC: 33.4 % (ref 32–34.5)
MCV RBC AUTO: 86.4 FL (ref 80–99.9)
METAMYELOCYTES RELATIVE PERCENT: 0.9 % (ref 0–1)
METER GLUCOSE: 95 MG/DL (ref 74–99)
METHADONE SCREEN, URINE: NOT DETECTED
METHB: 0.3 % (ref 0–1.5)
METHB: 0.5 % (ref 0–1.5)
MODE: ABNORMAL
MODE: AC
MONOCYTES ABSOLUTE: 0.66 E9/L (ref 0.1–0.95)
MONOCYTES RELATIVE PERCENT: 9.6 % (ref 2–12)
MYCOPLASMA PNEUMONIAE BY PCR: NOT DETECTED
NEUTROPHILS ABSOLUTE: 5.68 E9/L (ref 1.8–7.3)
NEUTROPHILS RELATIVE PERCENT: 85.2 % (ref 43–80)
NITRITE, URINE: NEGATIVE
O2 CONTENT: 22.6 ML/DL
O2 SATURATION: 99.5 % (ref 92–98.5)
O2 SATURATION: 99.9 % (ref 92–98.5)
O2HB: 96.7 % (ref 94–97)
O2HB: 98.3 % (ref 94–97)
OPERATOR ID: 1632
OPERATOR ID: 421
OPIATE SCREEN URINE: NOT DETECTED
OXYCODONE URINE: NOT DETECTED
PARAINFLUENZA VIRUS 1 BY PCR: NOT DETECTED
PARAINFLUENZA VIRUS 2 BY PCR: NOT DETECTED
PARAINFLUENZA VIRUS 3 BY PCR: NOT DETECTED
PARAINFLUENZA VIRUS 4 BY PCR: NOT DETECTED
PATIENT TEMP: 37 C
PATIENT TEMP: 37 C
PCO2: 27 MMHG (ref 35–45)
PCO2: 44 MMHG (ref 35–45)
PDW BLD-RTO: 13.3 FL (ref 11.5–15)
PEEP/CPAP: 5 CMH2O
PFO2: 2.56 MMHG/%
PH BLOOD GAS: 7.34 (ref 7.35–7.45)
PH BLOOD GAS: 7.53 (ref 7.35–7.45)
PH UA: 7 (ref 5–9)
PHENCYCLIDINE SCREEN URINE: NOT DETECTED
PHOSPHORUS: 2.7 MG/DL (ref 2.5–4.5)
PLATELET # BLD: 229 E9/L (ref 130–450)
PMV BLD AUTO: 11 FL (ref 7–12)
PO2: 256 MMHG (ref 75–100)
PO2: 425.2 MMHG (ref 75–100)
POTASSIUM REFLEX MAGNESIUM: 4.2 MMOL/L (ref 3.5–5)
POTASSIUM SERPL-SCNC: 4.8 MMOL/L (ref 3.5–5)
PRO-BNP: 24 PG/ML (ref 0–125)
PROCALCITONIN: 0.06 NG/ML (ref 0–0.08)
PROTEIN UA: ABNORMAL MG/DL
PROTHROMBIN TIME: 14.3 SEC (ref 9.3–12.4)
RBC # BLD: 5.3 E12/L (ref 3.8–5.8)
RBC UA: ABNORMAL /HPF (ref 0–2)
RESPIRATORY SYNCYTIAL VIRUS BY PCR: NOT DETECTED
RI(T): 1.52
RR MECHANICAL: 14 B/MIN
SALICYLATE, SERUM: <0.3 MG/DL (ref 0–30)
SARS-COV-2, PCR: DETECTED
SEDIMENTATION RATE, ERYTHROCYTE: 5 MM/HR (ref 0–15)
SODIUM BLD-SCNC: 136 MMOL/L (ref 132–146)
SODIUM BLD-SCNC: 140 MMOL/L (ref 132–146)
SOURCE, BLOOD GAS: ABNORMAL
SOURCE, BLOOD GAS: ABNORMAL
SPECIFIC GRAVITY UA: 1.02 (ref 1–1.03)
THB: 14.8 G/DL (ref 11.5–16.5)
THB: 15.8 G/DL (ref 11.5–16.5)
TIME ANALYZED: 1714
TIME ANALYZED: 2253
TOTAL PROTEIN: 6.5 G/DL (ref 6.4–8.3)
TOTAL PROTEIN: 7.8 G/DL (ref 6.4–8.3)
TRICYCLIC ANTIDEPRESSANTS SCREEN SERUM: NEGATIVE NG/ML
TROPONIN, HIGH SENSITIVITY: <6 NG/L (ref 0–11)
UROBILINOGEN, URINE: 1 E.U./DL
VT MECHANICAL: 400 ML
WBC # BLD: 6.6 E9/L (ref 4.5–11.5)
WBC UA: ABNORMAL /HPF (ref 0–5)

## 2022-07-30 PROCEDURE — 80307 DRUG TEST PRSMV CHEM ANLYZR: CPT

## 2022-07-30 PROCEDURE — 51702 INSERT TEMP BLADDER CATH: CPT

## 2022-07-30 PROCEDURE — 2000000000 HC ICU R&B

## 2022-07-30 PROCEDURE — 85651 RBC SED RATE NONAUTOMATED: CPT

## 2022-07-30 PROCEDURE — 2580000003 HC RX 258: Performed by: FAMILY MEDICINE

## 2022-07-30 PROCEDURE — 6360000002 HC RX W HCPCS: Performed by: STUDENT IN AN ORGANIZED HEALTH CARE EDUCATION/TRAINING PROGRAM

## 2022-07-30 PROCEDURE — 6360000002 HC RX W HCPCS

## 2022-07-30 PROCEDURE — 74177 CT ABD & PELVIS W/CONTRAST: CPT

## 2022-07-30 PROCEDURE — 6360000004 HC RX CONTRAST MEDICATION: Performed by: RADIOLOGY

## 2022-07-30 PROCEDURE — 85384 FIBRINOGEN ACTIVITY: CPT

## 2022-07-30 PROCEDURE — 96361 HYDRATE IV INFUSION ADD-ON: CPT

## 2022-07-30 PROCEDURE — 71275 CT ANGIOGRAPHY CHEST: CPT

## 2022-07-30 PROCEDURE — 83735 ASSAY OF MAGNESIUM: CPT

## 2022-07-30 PROCEDURE — 81001 URINALYSIS AUTO W/SCOPE: CPT

## 2022-07-30 PROCEDURE — 96375 TX/PRO/DX INJ NEW DRUG ADDON: CPT

## 2022-07-30 PROCEDURE — 86140 C-REACTIVE PROTEIN: CPT

## 2022-07-30 PROCEDURE — 84100 ASSAY OF PHOSPHORUS: CPT

## 2022-07-30 PROCEDURE — 96376 TX/PRO/DX INJ SAME DRUG ADON: CPT

## 2022-07-30 PROCEDURE — 87449 NOS EACH ORGANISM AG IA: CPT

## 2022-07-30 PROCEDURE — 2500000003 HC RX 250 WO HCPCS: Performed by: EMERGENCY MEDICINE

## 2022-07-30 PROCEDURE — 83615 LACTATE (LD) (LDH) ENZYME: CPT

## 2022-07-30 PROCEDURE — 99285 EMERGENCY DEPT VISIT HI MDM: CPT

## 2022-07-30 PROCEDURE — 93005 ELECTROCARDIOGRAM TRACING: CPT | Performed by: EMERGENCY MEDICINE

## 2022-07-30 PROCEDURE — 82728 ASSAY OF FERRITIN: CPT

## 2022-07-30 PROCEDURE — 84145 PROCALCITONIN (PCT): CPT

## 2022-07-30 PROCEDURE — 85378 FIBRIN DEGRADE SEMIQUANT: CPT

## 2022-07-30 PROCEDURE — 82962 GLUCOSE BLOOD TEST: CPT

## 2022-07-30 PROCEDURE — 82077 ASSAY SPEC XCP UR&BREATH IA: CPT

## 2022-07-30 PROCEDURE — 87150 DNA/RNA AMPLIFIED PROBE: CPT

## 2022-07-30 PROCEDURE — 87088 URINE BACTERIA CULTURE: CPT

## 2022-07-30 PROCEDURE — 96365 THER/PROPH/DIAG IV INF INIT: CPT

## 2022-07-30 PROCEDURE — 36415 COLL VENOUS BLD VENIPUNCTURE: CPT

## 2022-07-30 PROCEDURE — 85610 PROTHROMBIN TIME: CPT

## 2022-07-30 PROCEDURE — 89220 SPUTUM SPECIMEN COLLECTION: CPT

## 2022-07-30 PROCEDURE — 6360000002 HC RX W HCPCS: Performed by: EMERGENCY MEDICINE

## 2022-07-30 PROCEDURE — 83880 ASSAY OF NATRIURETIC PEPTIDE: CPT

## 2022-07-30 PROCEDURE — 31500 INSERT EMERGENCY AIRWAY: CPT

## 2022-07-30 PROCEDURE — 87070 CULTURE OTHR SPECIMN AEROBIC: CPT

## 2022-07-30 PROCEDURE — 6360000002 HC RX W HCPCS: Performed by: FAMILY MEDICINE

## 2022-07-30 PROCEDURE — 2580000003 HC RX 258: Performed by: NURSE PRACTITIONER

## 2022-07-30 PROCEDURE — 74018 RADEX ABDOMEN 1 VIEW: CPT

## 2022-07-30 PROCEDURE — 80143 DRUG ASSAY ACETAMINOPHEN: CPT

## 2022-07-30 PROCEDURE — 87040 BLOOD CULTURE FOR BACTERIA: CPT

## 2022-07-30 PROCEDURE — 85025 COMPLETE CBC W/AUTO DIFF WBC: CPT

## 2022-07-30 PROCEDURE — 0BH18EZ INSERTION OF ENDOTRACHEAL AIRWAY INTO TRACHEA, VIA NATURAL OR ARTIFICIAL OPENING ENDOSCOPIC: ICD-10-PCS | Performed by: EMERGENCY MEDICINE

## 2022-07-30 PROCEDURE — 80053 COMPREHEN METABOLIC PANEL: CPT

## 2022-07-30 PROCEDURE — 94002 VENT MGMT INPAT INIT DAY: CPT

## 2022-07-30 PROCEDURE — 5A1945Z RESPIRATORY VENTILATION, 24-96 CONSECUTIVE HOURS: ICD-10-PCS | Performed by: EMERGENCY MEDICINE

## 2022-07-30 PROCEDURE — 2580000003 HC RX 258: Performed by: EMERGENCY MEDICINE

## 2022-07-30 PROCEDURE — 87206 SMEAR FLUORESCENT/ACID STAI: CPT

## 2022-07-30 PROCEDURE — 82805 BLOOD GASES W/O2 SATURATION: CPT

## 2022-07-30 PROCEDURE — 80179 DRUG ASSAY SALICYLATE: CPT

## 2022-07-30 PROCEDURE — 84484 ASSAY OF TROPONIN QUANT: CPT

## 2022-07-30 PROCEDURE — 83605 ASSAY OF LACTIC ACID: CPT

## 2022-07-30 PROCEDURE — 70450 CT HEAD/BRAIN W/O DYE: CPT

## 2022-07-30 PROCEDURE — 71045 X-RAY EXAM CHEST 1 VIEW: CPT

## 2022-07-30 PROCEDURE — 0202U NFCT DS 22 TRGT SARS-COV-2: CPT

## 2022-07-30 PROCEDURE — 85730 THROMBOPLASTIN TIME PARTIAL: CPT

## 2022-07-30 RX ORDER — ACETAMINOPHEN 325 MG/1
650 TABLET ORAL EVERY 6 HOURS PRN
Status: DISCONTINUED | OUTPATIENT
Start: 2022-07-30 | End: 2022-07-30

## 2022-07-30 RX ORDER — SODIUM CHLORIDE 0.9 % (FLUSH) 0.9 %
10 SYRINGE (ML) INJECTION EVERY 12 HOURS SCHEDULED
Status: DISCONTINUED | OUTPATIENT
Start: 2022-07-30 | End: 2022-08-04 | Stop reason: HOSPADM

## 2022-07-30 RX ORDER — DEXAMETHASONE SODIUM PHOSPHATE 10 MG/ML
10 INJECTION INTRAMUSCULAR; INTRAVENOUS EVERY 24 HOURS
Status: DISCONTINUED | OUTPATIENT
Start: 2022-08-05 | End: 2022-08-04 | Stop reason: HOSPADM

## 2022-07-30 RX ORDER — ACETAMINOPHEN 650 MG/1
650 SUPPOSITORY RECTAL EVERY 6 HOURS PRN
Status: DISCONTINUED | OUTPATIENT
Start: 2022-07-30 | End: 2022-07-30

## 2022-07-30 RX ORDER — ENOXAPARIN SODIUM 100 MG/ML
30 INJECTION SUBCUTANEOUS 2 TIMES DAILY
Status: DISCONTINUED | OUTPATIENT
Start: 2022-07-30 | End: 2022-07-30

## 2022-07-30 RX ORDER — ASCORBIC ACID 500 MG
500 TABLET ORAL DAILY
Status: DISCONTINUED | OUTPATIENT
Start: 2022-07-31 | End: 2022-08-04 | Stop reason: HOSPADM

## 2022-07-30 RX ORDER — SODIUM CHLORIDE 9 MG/ML
INJECTION, SOLUTION INTRAVENOUS PRN
Status: DISCONTINUED | OUTPATIENT
Start: 2022-07-30 | End: 2022-08-04 | Stop reason: HOSPADM

## 2022-07-30 RX ORDER — ROCURONIUM BROMIDE 10 MG/ML
100 INJECTION, SOLUTION INTRAVENOUS ONCE
Status: COMPLETED | OUTPATIENT
Start: 2022-07-30 | End: 2022-07-30

## 2022-07-30 RX ORDER — ONDANSETRON 4 MG/1
4 TABLET, ORALLY DISINTEGRATING ORAL EVERY 8 HOURS PRN
Status: DISCONTINUED | OUTPATIENT
Start: 2022-07-30 | End: 2022-07-30

## 2022-07-30 RX ORDER — PROMETHAZINE HYDROCHLORIDE 12.5 MG/1
12.5 TABLET ORAL EVERY 6 HOURS PRN
Status: DISCONTINUED | OUTPATIENT
Start: 2022-07-30 | End: 2022-08-04 | Stop reason: HOSPADM

## 2022-07-30 RX ORDER — ONDANSETRON 2 MG/ML
4 INJECTION INTRAMUSCULAR; INTRAVENOUS EVERY 6 HOURS PRN
Status: DISCONTINUED | OUTPATIENT
Start: 2022-07-30 | End: 2022-08-04 | Stop reason: HOSPADM

## 2022-07-30 RX ORDER — PROPOFOL 10 MG/ML
5-50 INJECTION, EMULSION INTRAVENOUS CONTINUOUS
Status: DISCONTINUED | OUTPATIENT
Start: 2022-07-30 | End: 2022-07-31

## 2022-07-30 RX ORDER — PROPOFOL 10 MG/ML
INJECTION, EMULSION INTRAVENOUS
Status: COMPLETED
Start: 2022-07-30 | End: 2022-07-30

## 2022-07-30 RX ORDER — ZINC SULFATE 50(220)MG
50 CAPSULE ORAL DAILY
Status: DISCONTINUED | OUTPATIENT
Start: 2022-07-31 | End: 2022-08-04 | Stop reason: HOSPADM

## 2022-07-30 RX ORDER — MIDAZOLAM HYDROCHLORIDE 2 MG/2ML
4 INJECTION, SOLUTION INTRAMUSCULAR; INTRAVENOUS ONCE
Status: COMPLETED | OUTPATIENT
Start: 2022-07-30 | End: 2022-07-30

## 2022-07-30 RX ORDER — ENOXAPARIN SODIUM 100 MG/ML
40 INJECTION SUBCUTANEOUS DAILY
Status: DISCONTINUED | OUTPATIENT
Start: 2022-07-31 | End: 2022-08-04 | Stop reason: HOSPADM

## 2022-07-30 RX ORDER — CHOLECALCIFEROL (VITAMIN D3) 50 MCG
2000 TABLET ORAL DAILY
Status: DISCONTINUED | OUTPATIENT
Start: 2022-07-31 | End: 2022-08-04 | Stop reason: HOSPADM

## 2022-07-30 RX ORDER — ETOMIDATE 2 MG/ML
20 INJECTION INTRAVENOUS ONCE
Status: COMPLETED | OUTPATIENT
Start: 2022-07-30 | End: 2022-07-30

## 2022-07-30 RX ORDER — POLYETHYLENE GLYCOL 3350 17 G/17G
17 POWDER, FOR SOLUTION ORAL DAILY PRN
Status: DISCONTINUED | OUTPATIENT
Start: 2022-07-30 | End: 2022-08-04 | Stop reason: HOSPADM

## 2022-07-30 RX ORDER — POLYETHYLENE GLYCOL 3350 17 G/17G
17 POWDER, FOR SOLUTION ORAL DAILY PRN
Status: DISCONTINUED | OUTPATIENT
Start: 2022-07-30 | End: 2022-07-30

## 2022-07-30 RX ORDER — DEXAMETHASONE SODIUM PHOSPHATE 10 MG/ML
10 INJECTION, SOLUTION INTRAMUSCULAR; INTRAVENOUS ONCE
Status: COMPLETED | OUTPATIENT
Start: 2022-07-30 | End: 2022-07-30

## 2022-07-30 RX ORDER — ONDANSETRON 2 MG/ML
4 INJECTION INTRAMUSCULAR; INTRAVENOUS EVERY 6 HOURS PRN
Status: DISCONTINUED | OUTPATIENT
Start: 2022-07-30 | End: 2022-07-30

## 2022-07-30 RX ORDER — SODIUM CHLORIDE 9 MG/ML
INJECTION, SOLUTION INTRAVENOUS PRN
Status: DISCONTINUED | OUTPATIENT
Start: 2022-07-30 | End: 2022-07-30

## 2022-07-30 RX ORDER — MIDAZOLAM HYDROCHLORIDE 2 MG/2ML
2 INJECTION, SOLUTION INTRAMUSCULAR; INTRAVENOUS
Status: DISCONTINUED | OUTPATIENT
Start: 2022-07-30 | End: 2022-07-31

## 2022-07-30 RX ORDER — SODIUM CHLORIDE 0.9 % (FLUSH) 0.9 %
10 SYRINGE (ML) INJECTION PRN
Status: DISCONTINUED | OUTPATIENT
Start: 2022-07-30 | End: 2022-07-30

## 2022-07-30 RX ORDER — LEVETIRACETAM 10 MG/ML
INJECTION INTRAVASCULAR
Status: COMPLETED
Start: 2022-07-30 | End: 2022-07-30

## 2022-07-30 RX ORDER — SODIUM CHLORIDE 0.9 % (FLUSH) 0.9 %
5-40 SYRINGE (ML) INJECTION EVERY 12 HOURS SCHEDULED
Status: DISCONTINUED | OUTPATIENT
Start: 2022-07-30 | End: 2022-08-04 | Stop reason: HOSPADM

## 2022-07-30 RX ORDER — 0.9 % SODIUM CHLORIDE 0.9 %
30 INTRAVENOUS SOLUTION INTRAVENOUS ONCE
Status: COMPLETED | OUTPATIENT
Start: 2022-07-30 | End: 2022-07-30

## 2022-07-30 RX ORDER — FENTANYL CITRATE 50 UG/ML
INJECTION, SOLUTION INTRAMUSCULAR; INTRAVENOUS
Status: COMPLETED
Start: 2022-07-30 | End: 2022-07-30

## 2022-07-30 RX ORDER — ACETAMINOPHEN 650 MG/1
650 SUPPOSITORY RECTAL EVERY 6 HOURS PRN
Status: DISCONTINUED | OUTPATIENT
Start: 2022-07-30 | End: 2022-08-04 | Stop reason: HOSPADM

## 2022-07-30 RX ORDER — FENTANYL CITRATE 50 UG/ML
100 INJECTION, SOLUTION INTRAMUSCULAR; INTRAVENOUS
Status: DISCONTINUED | OUTPATIENT
Start: 2022-07-30 | End: 2022-08-02

## 2022-07-30 RX ORDER — SODIUM CHLORIDE 0.9 % (FLUSH) 0.9 %
5-40 SYRINGE (ML) INJECTION PRN
Status: DISCONTINUED | OUTPATIENT
Start: 2022-07-30 | End: 2022-08-04 | Stop reason: HOSPADM

## 2022-07-30 RX ORDER — LEVETIRACETAM 10 MG/ML
2000 INJECTION INTRAVASCULAR ONCE
Status: COMPLETED | OUTPATIENT
Start: 2022-07-30 | End: 2022-07-30

## 2022-07-30 RX ORDER — SODIUM CHLORIDE 9 MG/ML
INJECTION, SOLUTION INTRAVENOUS CONTINUOUS
Status: DISCONTINUED | OUTPATIENT
Start: 2022-07-30 | End: 2022-08-03

## 2022-07-30 RX ORDER — PROPOFOL 10 MG/ML
1 INJECTION, EMULSION INTRAVENOUS ONCE
Status: COMPLETED | OUTPATIENT
Start: 2022-07-30 | End: 2022-07-30

## 2022-07-30 RX ADMIN — SODIUM CHLORIDE 1000 ML: 9 INJECTION, SOLUTION INTRAVENOUS at 17:51

## 2022-07-30 RX ADMIN — FENTANYL CITRATE 100 MCG: 50 INJECTION, SOLUTION INTRAMUSCULAR; INTRAVENOUS at 18:04

## 2022-07-30 RX ADMIN — PROPOFOL 79 MG: 10 INJECTION, EMULSION INTRAVENOUS at 17:59

## 2022-07-30 RX ADMIN — SODIUM CHLORIDE: 9 INJECTION, SOLUTION INTRAVENOUS at 18:19

## 2022-07-30 RX ADMIN — MIDAZOLAM 2 MG: 1 INJECTION INTRAMUSCULAR; INTRAVENOUS at 18:17

## 2022-07-30 RX ADMIN — PROPOFOL 40 MCG/KG/MIN: 10 INJECTION, EMULSION INTRAVENOUS at 21:00

## 2022-07-30 RX ADMIN — PIPERACILLIN AND TAZOBACTAM 4500 MG: 4; .5 INJECTION, POWDER, FOR SOLUTION INTRAVENOUS at 19:38

## 2022-07-30 RX ADMIN — MIDAZOLAM 2 MG: 1 INJECTION INTRAMUSCULAR; INTRAVENOUS at 23:18

## 2022-07-30 RX ADMIN — ROCURONIUM BROMIDE 100 MG: 10 INJECTION, SOLUTION INTRAVENOUS at 18:05

## 2022-07-30 RX ADMIN — MIDAZOLAM 4 MG: 1 INJECTION, SOLUTION INTRAMUSCULAR; INTRAVENOUS at 18:04

## 2022-07-30 RX ADMIN — SODIUM CHLORIDE, PRESERVATIVE FREE 10 ML: 5 INJECTION INTRAVENOUS at 21:58

## 2022-07-30 RX ADMIN — MIDAZOLAM 2 MG: 1 INJECTION INTRAMUSCULAR; INTRAVENOUS at 19:23

## 2022-07-30 RX ADMIN — ETOMIDATE 20 MG: 2 INJECTION, SOLUTION INTRAVENOUS at 18:05

## 2022-07-30 RX ADMIN — LEVETIRACETAM 2000 MG: 10 INJECTION INTRAVENOUS at 18:05

## 2022-07-30 RX ADMIN — PROPOFOL 45 MCG/KG/MIN: 10 INJECTION, EMULSION INTRAVENOUS at 21:56

## 2022-07-30 RX ADMIN — Medication 50 MCG/HR: at 19:30

## 2022-07-30 RX ADMIN — SODIUM CHLORIDE: 9 INJECTION, SOLUTION INTRAVENOUS at 21:55

## 2022-07-30 RX ADMIN — IOPAMIDOL 75 ML: 755 INJECTION, SOLUTION INTRAVENOUS at 17:50

## 2022-07-30 RX ADMIN — LEVETIRACETAM 2000 MG: 10 INJECTION INTRAVASCULAR at 18:05

## 2022-07-30 RX ADMIN — DEXAMETHASONE SODIUM PHOSPHATE 10 MG: 10 INJECTION, SOLUTION INTRAMUSCULAR; INTRAVENOUS at 20:36

## 2022-07-30 RX ADMIN — FENTANYL CITRATE 100 MCG: 50 INJECTION, SOLUTION INTRAMUSCULAR; INTRAVENOUS at 19:23

## 2022-07-30 ASSESSMENT — PULMONARY FUNCTION TESTS
PIF_VALUE: 28
PIF_VALUE: 23
PIF_VALUE: 10
PIF_VALUE: 24
PIF_VALUE: 28
PIF_VALUE: 27
PIF_VALUE: 26

## 2022-07-30 ASSESSMENT — PAIN SCALES - GENERAL: PAINLEVEL_OUTOF10: 1

## 2022-07-30 NOTE — ED PROVIDER NOTES
[facility] ENCOUNTER      Pt Name: Raul Darden  MRN: [id number]  [place] 1975  Date of evaluation: 7/30/2022      CHIEF COMPLAINT       Chief Complaint   Patient presents with    Shortness of Breath    Chest Pain     Per family member, complaints of SOB and CP        HPI  Raul Darden is a 49 y.o. male pertinent social history of smoking, presents with shortness of breath and chest pain per family member. Patient's family member states that he called her today said he had chest pain and shortness of breath. Patient came by private vehicle to the hospital.  Patient was saturating at 80% initially at triage placed on a nonrebreather at that time. Patient unable to participate or review of systems due to severe respiratory distress and altered mental status. Except as noted above the remainder of the review of systems was reviewed and negative. Review of Systems   Unable to perform ROS: Mental status change      Physical Exam  Vitals and nursing note reviewed. Constitutional:       General: He is in acute distress. Appearance: Normal appearance. He is ill-appearing. He is not diaphoretic. HENT:      Head: Normocephalic and atraumatic. Nose: Nose normal.   Eyes:      Comments: Pupils looking down and out to left. Cardiovascular:      Rate and Rhythm: Normal rate and regular rhythm. Pulses: Normal pulses. Heart sounds: Normal heart sounds. Pulmonary:      Effort: Pulmonary effort is normal.      Breath sounds: Examination of the right-upper field reveals decreased breath sounds. Examination of the left-upper field reveals decreased breath sounds. Examination of the right-middle field reveals decreased breath sounds. Examination of the left-middle field reveals decreased breath sounds. Examination of the right-lower field reveals decreased breath sounds.  Examination of the left-lower field reveals decreased breath sounds. Decreased breath sounds present. No wheezing, rhonchi or rales. Abdominal:      General: Bowel sounds are normal.      Palpations: Abdomen is soft. Tenderness: There is no abdominal tenderness. There is no right CVA tenderness, left CVA tenderness or rebound. Negative signs include Aguiar's sign, Rovsing's sign and McBurney's sign. Musculoskeletal:         General: Normal range of motion. Skin:     General: Skin is warm and dry. Capillary Refill: Capillary refill takes less than 2 seconds. Neurological:      Mental Status: He is alert. Comments: GCS 3.   Psychiatric:         Mood and Affect: Mood normal.        Procedures   Intubation Procedure Note    Indication: impending respiratory failure    Consent: Unable to be obtained due to the emergent nature of this procedure. Medications Used: etomidate intravenously and rocuronium intravenously    Procedure: The patient was placed in the appropriate position. Cricoid pressure was utilized. Intubation was performed by direct laryngoscopy using a laryngoscope and an 8.0 cuffed endotracheal tube. The cuff was then inflated and the tube was secured appropriately at a distance of 24 cm to the dental ridge. Initial confirmation of placement included bilateral breath sounds, an end tidal CO2 detector, absence of sounds over the stomach, tube fogging, adequate chest rise, adequate pulse oximetry reading, and improved skin color. A chest x-ray to verify correct placement of the tube showed appropriate tube position. The patient tolerated the procedure well.      Complications: multiple attempts      MDM  Number of Diagnoses or Management Options  Acute respiratory failure with hypoxia (HCC)  Altered mental status, unspecified altered mental status type  Aspiration into airway, initial encounter  Aspiration pneumonia of right lower lobe, unspecified aspiration pneumonia type ([facility] [address]  Nasreen coma scale total score 3-8, at arrival to emergency department [facility])  Diagnosis management comments: 50-year-old male presents with chest pain and shortness of breath per chief complaint. Patient was here for with altered mental status with a GCS of 3. Patient is minimally responsive not protecting his airway. Patient was intubated. Concern that prior to information patient had fasciculations. Concerned that patient may have had a seizure. Patient given etomidate, rocuronium and lidocaine for intubation. Patient was then sent emergently for imaging. CT head negative for any acute process, CTA per radiology was negative for any acute process, the ET tube was placed in the appropriate position above the rodolfo, and there are pleural effusions in the right lower lobe on CT. Patient's electrolytes, CBC are all within normal limits. His serum drug screen did test positive for benzodiazepines. Patient is on a Versed infusion, propofol infusion, given 2 g of Keppra as a loading dose. Concern was the patient was having a suspected seizure episode. Patient was given Zosyn for concern for aspiration pneumonia in the right lower lobe. Patient will be admitted to the ICU. ED Course as of 07/30/22 1925   Sat Jul 30, 2022   1723 Venous Access Procedure Note  Indication: emergent need for intravenous access    Procedure: The patient was placed in the appropriate position and the skin over the puncture site was prepped with chlorhexidine. Intravenous access was obtained in the left external jugular vein and the site was secured appropriately. Procedure was preformed under live ultrasound for guidance. The patient tolerated the procedure well. Complications: None       [ME]   1906 Patient has been accepted for critical care management. [JV]   1907 CTA PULMONARY W CONTRAST  ET tube above rodolfo. In appropriate position.  [JV]      ED Course User Index  [JV] Monique Pulido MD  [ME] Justin Holland DO --------------------------------------------- PAST HISTORY ---------------------------------------------  Past Medical History:  has a past medical history of Bimalleolar fracture of left ankle. Past Surgical History:  has a past surgical history that includes Ankle fracture surgery (Left, 2011) and fracture surgery (9-18-14). Social History:  reports that he has been smoking cigars. He has a 2.50 pack-year smoking history. He has never used smokeless tobacco. He reports that he does not drink alcohol and does not use drugs. Family History: family history is not on file. The patients home medications have been reviewed. Allergies: Patient has no known allergies.     -------------------------------------------------- RESULTS -------------------------------------------------    LABS:  Results for orders placed or performed during the hospital encounter of 07/30/22   Troponin   Result Value Ref Range    Troponin, High Sensitivity <6 0 - 11 ng/L   CBC with Auto Differential   Result Value Ref Range    WBC 6.6 4.5 - 11.5 E9/L    RBC 5.30 3.80 - 5.80 E12/L    Hemoglobin 15.3 12.5 - 16.5 g/dL    Hematocrit 45.8 37.0 - 54.0 %    MCV 86.4 80.0 - 99.9 fL    MCH 28.9 26.0 - 35.0 pg    MCHC 33.4 32.0 - 34.5 %    RDW 13.3 11.5 - 15.0 fL    Platelets 045 909 - 195 E9/L    MPV 11.0 7.0 - 12.0 fL   Comprehensive Metabolic Panel   Result Value Ref Range    Sodium 140 132 - 146 mmol/L    Potassium 4.8 3.5 - 5.0 mmol/L    Chloride 103 98 - 107 mmol/L    CO2 23 22 - 29 mmol/L    Anion Gap 14 7 - 16 mmol/L    Glucose 79 74 - 99 mg/dL    BUN 10 6 - 20 mg/dL    Creatinine 1.0 0.7 - 1.2 mg/dL    GFR Non-African American >60 >=60 mL/min/1.73    GFR African American >60     Calcium 9.7 8.6 - 10.2 mg/dL    Total Protein 7.8 6.4 - 8.3 g/dL    Albumin 4.3 3.5 - 5.2 g/dL    Total Bilirubin 0.3 0.0 - 1.2 mg/dL    Alkaline Phosphatase 111 40 - 129 U/L    ALT 36 0 - 40 U/L    AST 29 0 - 39 U/L   APTT   Result Value Ref Range    aPTT 32.4 24.5 - 35.1 sec   Protime-INR   Result Value Ref Range    Protime 14.3 (H) 9.3 - 12.4 sec    INR 1.3    Brain Natriuretic Peptide   Result Value Ref Range    Pro-BNP 24 0 - 125 pg/mL   Magnesium   Result Value Ref Range    Magnesium 1.8 1.6 - 2.6 mg/dL   Urinalysis   Result Value Ref Range    Color, UA Yellow Straw/Yellow    Clarity, UA Clear Clear    Glucose, Ur Negative Negative mg/dL    Bilirubin Urine Negative Negative    Ketones, Urine TRACE (A) Negative mg/dL    Specific Gravity, UA 1.025 1.005 - 1.030    Blood, Urine Negative Negative    pH, UA 7.0 5.0 - 9.0    Protein, UA TRACE Negative mg/dL    Urobilinogen, Urine 1.0 <2.0 E.U./dL    Nitrite, Urine Negative Negative    Leukocyte Esterase, Urine TRACE (A) Negative   Urine Drug Screen   Result Value Ref Range    Amphetamine Screen, Urine NOT DETECTED Negative <1000 ng/mL    Barbiturate Screen, Ur NOT DETECTED Negative < 200 ng/mL    Benzodiazepine Screen, Urine POSITIVE (A) Negative < 200 ng/mL    Cannabinoid Scrn, Ur POSITIVE (A) Negative < 50ng/mL    Cocaine Metabolite Screen, Urine NOT DETECTED Negative < 300 ng/mL    Opiate Scrn, Ur NOT DETECTED Negative < 300ng/mL    PCP Screen, Urine NOT DETECTED Negative < 25 ng/mL    Methadone Screen, Urine NOT DETECTED Negative <300 ng/mL    Oxycodone Urine NOT DETECTED Negative <100 ng/mL    FENTANYL SCREEN, URINE NOT DETECTED Negative <1 ng/mL    Drug Screen Comment: see below    Serum Drug Screen   Result Value Ref Range    Ethanol Lvl <10 mg/dL    Acetaminophen Level <5.0 (L) 10.0 - 57.2 mcg/mL    Salicylate, Serum <1.0 0.0 - 30.0 mg/dL    TCA Scrn NEGATIVE Cutoff:300 ng/mL   Lactate, Sepsis   Result Value Ref Range    Lactic Acid, Sepsis 1.4 0.5 - 1.9 mmol/L   Blood Gas, Arterial   Result Value Ref Range    Date Analyzed [id number]     Time Analyzed 1714     Source: Blood Arterial     pH, Blood Gas 7.526 (H) 7.350 - 7.450    PCO2 27.0 (L) 35.0 - 45.0 mmHg    PO2 425.2 (H) 75.0 - 100.0 mmHg HCO3 21.9 (L) 22.0 - 26.0 mmol/L    B.E. 0.7 -3.0 - 3.0 mmol/L    O2 Sat 99.9 (H) 92.0 - 98.5 %    O2Hb 96.7 94.0 - 97.0 %    COHb 2.9 (H) 0.0 - 1.5 %    MetHb 0.3 0.0 - 1.5 %    O2 Content 22.6 mL/dL    HHb 0.1 0.0 - 5.0 %    tHb (est) 15.8 11.5 - 16.5 g/dL    Mode NRB 15L     Date Of Collection      Time Collected      Pt Temp 37.0 C     ID [id number]     Lab [id number]     Critical(s) Notified . No Critical Values    Microscopic Urinalysis   Result Value Ref Range    WBC, UA 2-5 0 - 5 /HPF    RBC, UA 0-1 0 - 2 /HPF    Bacteria, UA RARE (A) None Seen /HPF   POCT Glucose   Result Value Ref Range    QC OK? y    POCT Glucose   Result Value Ref Range    Meter Glucose 95 74 - 99 mg/dL       RADIOLOGY:  CT HEAD WO CONTRAST   Final Result   No acute intracranial abnormality. Soft tissue density completely filling the nasal cavity and nasopharynx   probably inflammatory/secretions. Malignancy has to be excluded and direct   visualization recommended. CTA chest.      Comparison September 4, 2014      Findings      The heart and the great vessels are normal.  Nonenlarged mediastinal and   hilar lymph nodes are present. Trachea and major bronchi are patent. There   are no filling defects in the main pulmonary artery and the central branches. Says mucous plugging is identified in the lower lobe bronchi bilaterally. There is atelectasis/infiltrates in the right lower lobe, right middle lobe   and to a lesser extent in the left lower lobe. There is no pleural effusion. Impression      There is no central pulmonary embolism or aortic dissection. Mucous plugging in the lower lobes bilaterally with atelectasis/infiltrates   in the lower lobes more on the right side and right middle lobe concerning   for pneumonia. CT abdomen pelvis. The liver is of normal architecture. Gallbladder is partially distended.    Spleen, pancreas, and the adrenals are normal.  There is heterogeneous   enhancement of the kidneys with the areas of decreased attenuation concerning   for pyelonephritis. Dilated fluid-filled small bowel loops are noted likely   ileus/enteritis with small bowel loops measuring up to 2 cm. Pelvis. Bladder is partially distended with the Hampton catheter and the wall   thickening. Colon is normal.  The appendix is partially identified and   appears normal.      Impression      Heterogeneous enhancement of the kidneys concerning for multifocal   pyelonephritis. There is also thickening of the urinary bladder. Please   correlate with urinalysis. Dilated fluid-filled small bowel loops likely ileus/enteritis. CTA PULMONARY W CONTRAST   Final Result   No acute intracranial abnormality. Soft tissue density completely filling the nasal cavity and nasopharynx   probably inflammatory/secretions. Malignancy has to be excluded and direct   visualization recommended. CTA chest.      Comparison September 4, 2014      Findings      The heart and the great vessels are normal.  Nonenlarged mediastinal and   hilar lymph nodes are present. Trachea and major bronchi are patent. There   are no filling defects in the main pulmonary artery and the central branches. Says mucous plugging is identified in the lower lobe bronchi bilaterally. There is atelectasis/infiltrates in the right lower lobe, right middle lobe   and to a lesser extent in the left lower lobe. There is no pleural effusion. Impression      There is no central pulmonary embolism or aortic dissection. Mucous plugging in the lower lobes bilaterally with atelectasis/infiltrates   in the lower lobes more on the right side and right middle lobe concerning   for pneumonia. CT abdomen pelvis. The liver is of normal architecture. Gallbladder is partially distended.    Spleen, pancreas, and the adrenals are normal.  There is heterogeneous   enhancement of the kidneys with the areas of decreased attenuation pyelonephritis. Dilated fluid-filled small bowel loops are noted likely   ileus/enteritis with small bowel loops measuring up to 2 cm. Pelvis. Bladder is partially distended with the Hampton catheter and the wall   thickening. Colon is normal.  The appendix is partially identified and   appears normal.      Impression      Heterogeneous enhancement of the kidneys concerning for multifocal   pyelonephritis. There is also thickening of the urinary bladder. Please   correlate with urinalysis. Dilated fluid-filled small bowel loops likely ileus/enteritis. XR CHEST PORTABLE    (Results Pending)   XR ABDOMEN FOR NG/OG/NE TUBE PLACEMENT    (Results Pending)       EKG: This EKG is signed and interpreted by me. Heart rate 108. Sinus tachycardia. Normal axis deviation. QTc 460. No ST elevations or depressions. No previous EKG to compare to.    ------------------------- NURSING NOTES AND VITALS REVIEWED ---------------------------  Date / Time Roomed:  7/30/2022  4:51 PM  ED Bed Assignment:  16/16    The nursing notes within the ED encounter and vital signs as below have been reviewed. Patient Vitals for the past 24 hrs:   BP Temp Pulse Resp SpO2 Height Weight   07/30/22 1745 (!) 181/96 99.3 °F (37.4 °C) (!) 102 16 100 % -- --   07/30/22 1730 (!) 142/92 -- 94 14 -- -- --   07/30/22 1715 (!) 138/95 -- 87 20 -- -- --   07/30/22 1710 -- -- 92 20 100 % -- --   07/30/22 1700 (!) 149/85 -- 96 21 93 % -- --   07/30/22 1643 (!) 162/84 -- 83 24 100 % 6' 2\" (1.88 m) 175 lb (79.4 kg)   07/30/22 1636 -- -- 80 -- 100 % -- --       Oxygen Saturation Interpretation: Improved after treatment    ------------------------------------------ PROGRESS NOTES ------------------------------------------    Counseling:  I have spoken with the patient and discussed todays results, in addition to providing specific details for the plan of care and counseling regarding the diagnosis and prognosis.   Their questions are answered at this time and they are agreeable with the plan of admission.    --------------------------------- ADDITIONAL PROVIDER NOTES ---------------------------------  Consultations:  Spoke with Dr. Shin Walters. Discussed case. They will admit the patient. Spoke with Dr. Neo Garza critical care who will accept patient for management in the ICU. This patient's ED course included: a personal history and physicial examination, re-evaluation prior to disposition, multiple bedside re-evaluations, IV medications, cardiac monitoring, continuous pulse oximetry, and complex medical decision making and emergency management    Please note that the withdrawal or failure to initiate urgent interventions for this patient would likely result in a life threatening deterioration or permanent disability. Accordingly this patient received 30 minutes of critical care time, excluding separately billable procedures. This patient has remained hemodynamically stable during their ED course. Diagnosis:  1. Jefferson coma scale total score 3-8, at arrival to emergency department [facility])    2. Altered mental status, unspecified altered mental status type    3. Acute respiratory failure with hypoxia (HCC)    4. Aspiration into airway, initial encounter    5. Aspiration pneumonia of right lower lobe, unspecified aspiration pneumonia type ([facility] [address]        Disposition:  Patient's disposition: Admit to CCU/ICU  Patient's condition is fair.           Dominga Read MD  Resident  07/30/22 1924       Dominga Read MD  Resident  07/30/22 0061

## 2022-07-30 NOTE — ED NOTES
Patient came in respiratory distress was not protecting his airway, intubation began     Robert Damon RN  07/30/22 [id number]

## 2022-07-30 NOTE — PROGRESS NOTES
Name: Dee Plasencia  : 1975  MRN: [id number]    Date: 2022    Benefits of immediately proceeding with Radiology exam outweigh the risks and therefore the following is being waived:      [] Pregnancy test    [] Protocol for Iodine allergy    [] MRI questionnaire    [x] BUN/Creatinine        Londell Hempstead, DO

## 2022-07-30 NOTE — ED NOTES
RT notified for suctioning and in-line setup. RT to bedside and suctioned and placed in-line setup to ett.       Sherrill Nyhan, RN  07/30/22 4102

## 2022-07-30 NOTE — H&P
Hospitalist History & Physical      PCP: Rosa Leos DO    Date of Service: Pt seen/examined on 7/30/2022    Chief Complaint:  had concerns including Shortness of Breath and Chest Pain (Per family member, complaints of SOB and CP). History Of Present Illness:    Mr. Jose Luis Rankin, a 49y.o. year old male  who  has a past medical history of Bimalleolar fracture of left ankle. 54-year-old male presents with chest pain and shortness of breath per chief complaint. Patient was here for with altered mental status with a GCS of 3. Patient is minimally responsive not protecting his airway. Patient was intubated. Concern that prior to information patient had fasciculations. Concerned that patient may have had a seizure. Patient given etomidate, rocuronium and lidocaine for intubation. Patient was then sent emergently for imaging. CT head negative for any acute process, CTA per radiology was negative for any acute process, the ET tube was placed in the appropriate position above the rodolfo, and there are pleural effusions in the right lower lobe on CT. Patient's electrolytes, CBC are all within normal limits. His serum drug screen did test positive for benzodiazepines. Patient is on a Versed infusion, propofol infusion, given 2 g of Keppra as a loading dose. Concern was the patient was having a suspected seizure episode. Patient was given Zosyn for concern for aspiration pneumonia in the right lower lobe. Patient will be admitted to the ICU. Past Medical History:   Diagnosis Date    Bimalleolar fracture of left ankle 9/4/2014       Past Surgical History:   Procedure Laterality Date    ANKLE FRACTURE SURGERY Left 2011    FRACTURE SURGERY  9-18-14    ORIF Left Medial Maleous       Prior to Admission medications    Medication Sig Start Date End Date Taking?  Authorizing Provider   naproxen (NAPROSYN) 500 MG tablet Take 1 tablet by mouth 2 times daily as needed for Pain 7/6/22   Sadiq Ramos Kayla, APRN - CNP         Allergies:  Patient has no known allergies. Social History:    TOBACCO:   reports that he has been smoking cigars. He has a 2.50 pack-year smoking history. He has never used smokeless tobacco.  ETOH:   reports no history of alcohol use. Family History:    Reviewed in detail and negative for DM, CAD, Cancer, CVA. Positive as follows\"  No family history on file. REVIEW OF SYSTEMS:   Pertinent positives as noted in the HPI. All other systems reviewed and negative. PHYSICAL EXAM:  BP (!) 181/96   Pulse (!) 102   Temp 99.3 °F (37.4 °C)   Resp 16   Ht 6' 2\" (1.88 m)   Wt 175 lb (79.4 kg)   SpO2 100%   BMI 22.47 kg/m²   General appearance: Intubated and sedated  HEENT: Normal cephalic, atraumatic without obvious deformity. Neck: Supple, with full range of motion. No jugular venous distention. Trachea midline. Respiratory: Mechanically ventilated  Cardiovascular: Tachycardic  Abdomen: Soft, nondistended  Musculoskeletal: No clubbing, cyanosis, edema of bilateral lower extremities. Brisk capillary refill. Skin: Normal skin color. No rashes or lesions. Neurologic: Intubated and sedated      CBC:   Recent Labs     07/30/22  1725   WBC 6.6   RBC 5.30   HGB 15.3   HCT 45.8   MCV 86.4   RDW 13.3        BMP:   Recent Labs     07/30/22  1725      K 4.8      CO2 23   BUN 10   CREATININE 1.0   MG 1.8     LFT:  Recent Labs     07/30/22  1725   PROT 7.8   ALKPHOS 111   ALT 36   AST 29   BILITOT 0.3     CE:  No results for input(s): Luz Candi in the last 72 hours. PT/INR:   Recent Labs     07/30/22  1725   INR 1.3   APTT 32.4     BNP: No results for input(s): BNP in the last 72 hours.   ESR: No results found for: SEDRATE  CRP: No results found for: CRP  D Dimer:   Lab Results   Component Value Date    DDIMER <200 09/10/2021      Folate and B12: No results found for: AAHNDSFG20, No results found for: FOLATE  Lactic Acid:   Lab Results   Component Value Date LACTA 1.3 09/04/2014     Thyroid Studies: No results found for: TSH, Dasie Dys    Oupatient labs:  Lab Results   Component Value Date    INR 1.3 07/30/2022       Urinalysis:    Lab Results   Component Value Date/Time    NITRU Negative 07/30/2022 05:25 PM    WBCUA 2-5 07/30/2022 05:25 PM    BACTERIA RARE 07/30/2022 05:25 PM    RBCUA 0-1 07/30/2022 05:25 PM    BLOODU Negative 07/30/2022 05:25 PM    SPECGRAV 1.025 07/30/2022 05:25 PM    GLUCOSEU Negative 07/30/2022 05:25 PM       Imaging:  CT HEAD WO CONTRAST    Result Date: 7/30/2022  EXAMINATION: CT OF THE HEAD WITHOUT CONTRAST; CTA OF THE CHEST; CT OF THE ABDOMEN AND PELVIS WITH CONTRAST  7/30/2022 5:49 pm TECHNIQUE: CT of the head was performed without the administration of intravenous contrast. Automated exposure control, iterative reconstruction, and/or weight based adjustment of the mA/kV was utilized to reduce the radiation dose to as low as reasonably achievable.; CTA of the chest was performed after the administration of intravenous contrast.  Multiplanar reformatted images are provided for review. MIP images are provided for review. Automated exposure control, iterative reconstruction, and/or weight based adjustment of the mA/kV was utilized to reduce the radiation dose to as low as reasonably achievable.; CT of the abdomen and pelvis was performed with the administration of intravenous contrast. Multiplanar reformatted images are provided for review. Automated exposure control, iterative reconstruction, and/or weight based adjustment of the mA/kV was utilized to reduce the radiation dose to as low as reasonably achievable. COMPARISON: September 4, 2014 HISTORY: ORDERING SYSTEM PROVIDED HISTORY: ams TECHNOLOGIST PROVIDED HISTORY: Has a \"code stroke\" or \"stroke alert\" been called? ->No Reason for exam:->ams Decision Support Exception - unselect if not a suspected or confirmed emergency medical condition->Emergency Medical Condition ([place]) What reading provider will be dictating this exam?->CRC FINDINGS: CT head. The ventricles are of normal size and configuration. The brain parenchyma has normal architecture and attenuation. There is no acute stroke, mass or hemorrhage. There is soft tissue density with the low-attenuation completely filling the nasopharynx and nasal cavity which may be due to secretions/inflammation. Underlying malignant process has to be excluded. Direct visualization recommended. No acute intracranial abnormality. Soft tissue density completely filling the nasal cavity and nasopharynx probably inflammatory/secretions. Malignancy has to be excluded and direct visualization recommended. CTA chest. Comparison September 4, 2014 Findings The heart and the great vessels are normal.  Nonenlarged mediastinal and hilar lymph nodes are present. Trachea and major bronchi are patent. There are no filling defects in the main pulmonary artery and the central branches. Says mucous plugging is identified in the lower lobe bronchi bilaterally. There is atelectasis/infiltrates in the right lower lobe, right middle lobe and to a lesser extent in the left lower lobe. There is no pleural effusion. Impression There is no central pulmonary embolism or aortic dissection. Mucous plugging in the lower lobes bilaterally with atelectasis/infiltrates in the lower lobes more on the right side and right middle lobe concerning for pneumonia. CT abdomen pelvis. The liver is of normal architecture. Gallbladder is partially distended. Spleen, pancreas, and the adrenals are normal.  There is heterogeneous enhancement of the kidneys with the areas of decreased attenuation concerning for pyelonephritis. Dilated fluid-filled small bowel loops are noted likely ileus/enteritis with small bowel loops measuring up to 2 cm. Pelvis. Bladder is partially distended with the Hampton catheter and the wall thickening.   Colon is normal.  The appendix is partially identified and appears normal. Impression Heterogeneous enhancement of the kidneys concerning for multifocal pyelonephritis. There is also thickening of the urinary bladder. Please correlate with urinalysis. Dilated fluid-filled small bowel loops likely ileus/enteritis. CT ABDOMEN PELVIS W IV CONTRAST Additional Contrast? None    Result Date: 7/30/2022  EXAMINATION: CT OF THE HEAD WITHOUT CONTRAST; CTA OF THE CHEST; CT OF THE ABDOMEN AND PELVIS WITH CONTRAST  7/30/2022 5:49 pm TECHNIQUE: CT of the head was performed without the administration of intravenous contrast. Automated exposure control, iterative reconstruction, and/or weight based adjustment of the mA/kV was utilized to reduce the radiation dose to as low as reasonably achievable.; CTA of the chest was performed after the administration of intravenous contrast.  Multiplanar reformatted images are provided for review. MIP images are provided for review. Automated exposure control, iterative reconstruction, and/or weight based adjustment of the mA/kV was utilized to reduce the radiation dose to as low as reasonably achievable.; CT of the abdomen and pelvis was performed with the administration of intravenous contrast. Multiplanar reformatted images are provided for review. Automated exposure control, iterative reconstruction, and/or weight based adjustment of the mA/kV was utilized to reduce the radiation dose to as low as reasonably achievable. COMPARISON: September 4, 2014 HISTORY: ORDERING SYSTEM PROVIDED HISTORY: ams TECHNOLOGIST PROVIDED HISTORY: Has a \"code stroke\" or \"stroke alert\" been called? ->No Reason for exam:->ams Decision Support Exception - unselect if not a suspected or confirmed emergency medical condition->Emergency Medical Condition ([place]) What reading provider will be dictating this exam?->CRC FINDINGS: CT head. The ventricles are of normal size and configuration. The brain parenchyma has normal architecture and attenuation.   There is no acute stroke, mass or hemorrhage. There is soft tissue density with the low-attenuation completely filling the nasopharynx and nasal cavity which may be due to secretions/inflammation. Underlying malignant process has to be excluded. Direct visualization recommended. No acute intracranial abnormality. Soft tissue density completely filling the nasal cavity and nasopharynx probably inflammatory/secretions. Malignancy has to be excluded and direct visualization recommended. CTA chest. Comparison September 4, 2014 Findings The heart and the great vessels are normal.  Nonenlarged mediastinal and hilar lymph nodes are present. Trachea and major bronchi are patent. There are no filling defects in the main pulmonary artery and the central branches. Says mucous plugging is identified in the lower lobe bronchi bilaterally. There is atelectasis/infiltrates in the right lower lobe, right middle lobe and to a lesser extent in the left lower lobe. There is no pleural effusion. Impression There is no central pulmonary embolism or aortic dissection. Mucous plugging in the lower lobes bilaterally with atelectasis/infiltrates in the lower lobes more on the right side and right middle lobe concerning for pneumonia. CT abdomen pelvis. The liver is of normal architecture. Gallbladder is partially distended. Spleen, pancreas, and the adrenals are normal.  There is heterogeneous enhancement of the kidneys with the areas of decreased attenuation concerning for pyelonephritis. Dilated fluid-filled small bowel loops are noted likely ileus/enteritis with small bowel loops measuring up to 2 cm. Pelvis. Bladder is partially distended with the Hampton catheter and the wall thickening. Colon is normal.  The appendix is partially identified and appears normal. Impression Heterogeneous enhancement of the kidneys concerning for multifocal pyelonephritis. There is also thickening of the urinary bladder.   Please correlate with urinalysis. Dilated fluid-filled small bowel loops likely ileus/enteritis. CTA PULMONARY W CONTRAST    Result Date: 7/30/2022  EXAMINATION: CT OF THE HEAD WITHOUT CONTRAST; CTA OF THE CHEST; CT OF THE ABDOMEN AND PELVIS WITH CONTRAST  7/30/2022 5:49 pm TECHNIQUE: CT of the head was performed without the administration of intravenous contrast. Automated exposure control, iterative reconstruction, and/or weight based adjustment of the mA/kV was utilized to reduce the radiation dose to as low as reasonably achievable.; CTA of the chest was performed after the administration of intravenous contrast.  Multiplanar reformatted images are provided for review. MIP images are provided for review. Automated exposure control, iterative reconstruction, and/or weight based adjustment of the mA/kV was utilized to reduce the radiation dose to as low as reasonably achievable.; CT of the abdomen and pelvis was performed with the administration of intravenous contrast. Multiplanar reformatted images are provided for review. Automated exposure control, iterative reconstruction, and/or weight based adjustment of the mA/kV was utilized to reduce the radiation dose to as low as reasonably achievable. COMPARISON: September 4, 2014 HISTORY: ORDERING SYSTEM PROVIDED HISTORY: ams TECHNOLOGIST PROVIDED HISTORY: Has a \"code stroke\" or \"stroke alert\" been called? ->No Reason for exam:->ams Decision Support Exception - unselect if not a suspected or confirmed emergency medical condition->Emergency Medical Condition (MA) What reading provider will be dictating this exam?->CRC FINDINGS: CT head. The ventricles are of normal size and configuration. The brain parenchyma has normal architecture and attenuation. There is no acute stroke, mass or hemorrhage. There is soft tissue density with the low-attenuation completely filling the nasopharynx and nasal cavity which may be due to secretions/inflammation.   Underlying malignant process has to be excluded. Direct visualization recommended. No acute intracranial abnormality. Soft tissue density completely filling the nasal cavity and nasopharynx probably inflammatory/secretions. Malignancy has to be excluded and direct visualization recommended. CTA chest. Comparison September 4, 2014 Findings The heart and the great vessels are normal.  Nonenlarged mediastinal and hilar lymph nodes are present. Trachea and major bronchi are patent. There are no filling defects in the main pulmonary artery and the central branches. Says mucous plugging is identified in the lower lobe bronchi bilaterally. There is atelectasis/infiltrates in the right lower lobe, right middle lobe and to a lesser extent in the left lower lobe. There is no pleural effusion. Impression There is no central pulmonary embolism or aortic dissection. Mucous plugging in the lower lobes bilaterally with atelectasis/infiltrates in the lower lobes more on the right side and right middle lobe concerning for pneumonia. CT abdomen pelvis. The liver is of normal architecture. Gallbladder is partially distended. Spleen, pancreas, and the adrenals are normal.  There is heterogeneous enhancement of the kidneys with the areas of decreased attenuation concerning for pyelonephritis. Dilated fluid-filled small bowel loops are noted likely ileus/enteritis with small bowel loops measuring up to 2 cm. Pelvis. Bladder is partially distended with the Hampton catheter and the wall thickening. Colon is normal.  The appendix is partially identified and appears normal. Impression Heterogeneous enhancement of the kidneys concerning for multifocal pyelonephritis. There is also thickening of the urinary bladder. Please correlate with urinalysis. Dilated fluid-filled small bowel loops likely ileus/enteritis.        ASSESSMENT:  -Acute respiratory failure  -Altered mental status  -Aspiration pneumonia  -Hypertension      PLAN:  -Admit to the ICU  -Consult critical care  -Zosyn 3.375 mg every 8 hours  -Blood cultures  -Respiratory cultures  -Mechanical ventilation  -Telemetry        Diet: No diet orders on file  Code Status: Prior  Surrogate decision maker confirmed with patient:   Extended Emergency Contact Information  Primary Emergency Contact: AndradeTavo   [address] Phone: [phone]  Relation: Other    DVT Prophylaxis: []Lovenox []Heparin []PCD [] [address] []Encouraged ambulation  Disposition: []Med/Surg [] Intermediate [] ICU/CCU  Admit status: [] Observation [] Inpatient     +++++++++++++++++++++++++++++++++++++++++++++++++  Cisco Milner,   +++++++++++++++++++++++++++++++++++++++++++++++++  NOTE: This report was transcribed using voice recognition software. Every effort was made to ensure accuracy; however, inadvertent computerized transcription errors may be present.

## 2022-07-31 ENCOUNTER — APPOINTMENT (OUTPATIENT)
Dept: GENERAL RADIOLOGY | Age: 47
DRG: 137 | End: 2022-07-31
Payer: MEDICAID

## 2022-07-31 PROBLEM — R93.89 ABNORMAL CT SCAN: Status: ACTIVE | Noted: 2022-07-31

## 2022-07-31 PROBLEM — A41.9 SEPSIS (HCC): Status: ACTIVE | Noted: 2022-07-31

## 2022-07-31 PROBLEM — R46.89 SPELL OF ABNORMAL BEHAVIOR: Status: ACTIVE | Noted: 2022-07-31

## 2022-07-31 PROBLEM — F12.10 MARIJUANA ABUSE: Status: ACTIVE | Noted: 2022-07-31

## 2022-07-31 PROBLEM — U07.1 COVID-19 VIRUS DETECTED: Status: ACTIVE | Noted: 2022-07-31

## 2022-07-31 PROBLEM — G93.40 ACUTE ENCEPHALOPATHY: Status: ACTIVE | Noted: 2022-07-31

## 2022-07-31 LAB
AADO2: 236.3 MMHG
ALBUMIN SERPL-MCNC: 3.6 G/DL (ref 3.5–5.2)
ALP BLD-CCNC: 97 U/L (ref 40–129)
ALT SERPL-CCNC: 34 U/L (ref 0–40)
ANION GAP SERPL CALCULATED.3IONS-SCNC: 9 MMOL/L (ref 7–16)
APTT: 32.6 SEC (ref 24.5–35.1)
AST SERPL-CCNC: 22 U/L (ref 0–39)
B.E.: -2.9 MMOL/L (ref -3–3)
BASOPHILS ABSOLUTE: 0 E9/L (ref 0–0.2)
BASOPHILS ABSOLUTE: 0.01 E9/L (ref 0–0.2)
BASOPHILS RELATIVE PERCENT: 0.1 % (ref 0–2)
BASOPHILS RELATIVE PERCENT: 0.1 % (ref 0–2)
BILIRUB SERPL-MCNC: 0.3 MG/DL (ref 0–1.2)
BUN BLDV-MCNC: 11 MG/DL (ref 6–20)
C-REACTIVE PROTEIN: 1.1 MG/DL (ref 0–0.4)
CALCIUM IONIZED: 1.18 MMOL/L (ref 1.15–1.33)
CALCIUM SERPL-MCNC: 8.1 MG/DL (ref 8.6–10.2)
CHLORIDE BLD-SCNC: 108 MMOL/L (ref 98–107)
CO2: 21 MMOL/L (ref 22–29)
COHB: 0.7 % (ref 0–1.5)
CREAT SERPL-MCNC: 0.9 MG/DL (ref 0.7–1.2)
CRITICAL: ABNORMAL
DATE ANALYZED: ABNORMAL
DATE OF COLLECTION: ABNORMAL
EOSINOPHILS ABSOLUTE: 0 E9/L (ref 0.05–0.5)
EOSINOPHILS ABSOLUTE: 0 E9/L (ref 0.05–0.5)
EOSINOPHILS RELATIVE PERCENT: 0 % (ref 0–6)
EOSINOPHILS RELATIVE PERCENT: 0 % (ref 0–6)
FIO2: 60 %
GFR AFRICAN AMERICAN: >60
GFR NON-AFRICAN AMERICAN: >60 ML/MIN/1.73
GLUCOSE BLD-MCNC: 127 MG/DL (ref 74–99)
HCO3: 22.6 MMOL/L (ref 22–26)
HCT VFR BLD CALC: 39.7 % (ref 37–54)
HCT VFR BLD CALC: 41.6 % (ref 37–54)
HEMOGLOBIN: 13 G/DL (ref 12.5–16.5)
HEMOGLOBIN: 13.8 G/DL (ref 12.5–16.5)
HHB: 1.4 % (ref 0–5)
IMMATURE GRANULOCYTES #: 0.04 E9/L
IMMATURE GRANULOCYTES %: 0.4 % (ref 0–5)
INR BLD: 1.4
L. PNEUMOPHILA SEROGP 1 UR AG: NORMAL
LAB: ABNORMAL
LACTIC ACID: 0.8 MMOL/L (ref 0.5–2.2)
LYMPHOCYTES ABSOLUTE: 0.16 E9/L (ref 1.5–4)
LYMPHOCYTES ABSOLUTE: 0.25 E9/L (ref 1.5–4)
LYMPHOCYTES RELATIVE PERCENT: 1.7 % (ref 20–42)
LYMPHOCYTES RELATIVE PERCENT: 2.8 % (ref 20–42)
Lab: ABNORMAL
MCH RBC QN AUTO: 28.3 PG (ref 26–35)
MCH RBC QN AUTO: 29.1 PG (ref 26–35)
MCHC RBC AUTO-ENTMCNC: 32.7 % (ref 32–34.5)
MCHC RBC AUTO-ENTMCNC: 33.2 % (ref 32–34.5)
MCV RBC AUTO: 86.3 FL (ref 80–99.9)
MCV RBC AUTO: 87.6 FL (ref 80–99.9)
METHB: 0.3 % (ref 0–1.5)
MODE: AC
MONOCYTES ABSOLUTE: 0.15 E9/L (ref 0.1–0.95)
MONOCYTES ABSOLUTE: 0.24 E9/L (ref 0.1–0.95)
MONOCYTES RELATIVE PERCENT: 1.7 % (ref 2–12)
MONOCYTES RELATIVE PERCENT: 2.6 % (ref 2–12)
NEUTROPHILS ABSOLUTE: 7.68 E9/L (ref 1.8–7.3)
NEUTROPHILS ABSOLUTE: 8.58 E9/L (ref 1.8–7.3)
NEUTROPHILS RELATIVE PERCENT: 95 % (ref 43–80)
NEUTROPHILS RELATIVE PERCENT: 95.7 % (ref 43–80)
O2 SATURATION: 98.5 % (ref 92–98.5)
O2HB: 97.6 % (ref 94–97)
OPERATOR ID: 1632
OVALOCYTES: ABNORMAL
PATIENT TEMP: 37 C
PCO2: 41.9 MMHG (ref 35–45)
PDW BLD-RTO: 13.4 FL (ref 11.5–15)
PDW BLD-RTO: 13.7 FL (ref 11.5–15)
PEEP/CPAP: 5 CMH2O
PFO2: 2.17 MMHG/%
PH BLOOD GAS: 7.35 (ref 7.35–7.45)
PLATELET # BLD: 157 E9/L (ref 130–450)
PLATELET # BLD: 169 E9/L (ref 130–450)
PMV BLD AUTO: 10.4 FL (ref 7–12)
PMV BLD AUTO: 10.7 FL (ref 7–12)
PO2: 130.4 MMHG (ref 75–100)
POIKILOCYTES: ABNORMAL
POTASSIUM REFLEX MAGNESIUM: 4.4 MMOL/L (ref 3.5–5)
PROTHROMBIN TIME: 15.8 SEC (ref 9.3–12.4)
RBC # BLD: 4.6 E12/L (ref 3.8–5.8)
RBC # BLD: 4.75 E12/L (ref 3.8–5.8)
RBC # BLD: NORMAL 10*6/UL
RI(T): 1.81
RR MECHANICAL: 14 B/MIN
SODIUM BLD-SCNC: 138 MMOL/L (ref 132–146)
SOURCE, BLOOD GAS: ABNORMAL
STREP PNEUMONIAE ANTIGEN, URINE: NORMAL
THB: 14 G/DL (ref 11.5–16.5)
TIME ANALYZED: 536
TOTAL PROTEIN: 6.5 G/DL (ref 6.4–8.3)
VITAMIN D 25-HYDROXY: 22 NG/ML (ref 30–100)
VT MECHANICAL: 400 ML
WBC # BLD: 8 E9/L (ref 4.5–11.5)
WBC # BLD: 9 E9/L (ref 4.5–11.5)

## 2022-07-31 PROCEDURE — 82330 ASSAY OF CALCIUM: CPT

## 2022-07-31 PROCEDURE — 95816 EEG AWAKE AND DROWSY: CPT

## 2022-07-31 PROCEDURE — 2580000003 HC RX 258: Performed by: FAMILY MEDICINE

## 2022-07-31 PROCEDURE — 2500000003 HC RX 250 WO HCPCS: Performed by: FAMILY MEDICINE

## 2022-07-31 PROCEDURE — 83605 ASSAY OF LACTIC ACID: CPT

## 2022-07-31 PROCEDURE — 2580000003 HC RX 258: Performed by: NURSE PRACTITIONER

## 2022-07-31 PROCEDURE — A4216 STERILE WATER/SALINE, 10 ML: HCPCS | Performed by: NURSE PRACTITIONER

## 2022-07-31 PROCEDURE — 6370000000 HC RX 637 (ALT 250 FOR IP)

## 2022-07-31 PROCEDURE — 85610 PROTHROMBIN TIME: CPT

## 2022-07-31 PROCEDURE — 99222 1ST HOSP IP/OBS MODERATE 55: CPT | Performed by: SURGERY

## 2022-07-31 PROCEDURE — 71045 X-RAY EXAM CHEST 1 VIEW: CPT

## 2022-07-31 PROCEDURE — 94640 AIRWAY INHALATION TREATMENT: CPT

## 2022-07-31 PROCEDURE — 6360000002 HC RX W HCPCS: Performed by: NURSE PRACTITIONER

## 2022-07-31 PROCEDURE — 6360000002 HC RX W HCPCS: Performed by: FAMILY MEDICINE

## 2022-07-31 PROCEDURE — 6370000000 HC RX 637 (ALT 250 FOR IP): Performed by: NURSE PRACTITIONER

## 2022-07-31 PROCEDURE — 80053 COMPREHEN METABOLIC PANEL: CPT

## 2022-07-31 PROCEDURE — 73030 X-RAY EXAM OF SHOULDER: CPT

## 2022-07-31 PROCEDURE — 82306 VITAMIN D 25 HYDROXY: CPT

## 2022-07-31 PROCEDURE — 82805 BLOOD GASES W/O2 SATURATION: CPT

## 2022-07-31 PROCEDURE — 2000000000 HC ICU R&B

## 2022-07-31 PROCEDURE — 86140 C-REACTIVE PROTEIN: CPT

## 2022-07-31 PROCEDURE — 95822 EEG COMA OR SLEEP ONLY: CPT | Performed by: PSYCHIATRY & NEUROLOGY

## 2022-07-31 PROCEDURE — 94003 VENT MGMT INPAT SUBQ DAY: CPT

## 2022-07-31 PROCEDURE — 99222 1ST HOSP IP/OBS MODERATE 55: CPT | Performed by: PSYCHIATRY & NEUROLOGY

## 2022-07-31 PROCEDURE — 73502 X-RAY EXAM HIP UNI 2-3 VIEWS: CPT

## 2022-07-31 PROCEDURE — 85025 COMPLETE CBC W/AUTO DIFF WBC: CPT

## 2022-07-31 PROCEDURE — 51702 INSERT TEMP BLADDER CATH: CPT

## 2022-07-31 PROCEDURE — 85730 THROMBOPLASTIN TIME PARTIAL: CPT

## 2022-07-31 PROCEDURE — C9113 INJ PANTOPRAZOLE SODIUM, VIA: HCPCS | Performed by: NURSE PRACTITIONER

## 2022-07-31 RX ORDER — IPRATROPIUM BROMIDE AND ALBUTEROL SULFATE 2.5; .5 MG/3ML; MG/3ML
1 SOLUTION RESPIRATORY (INHALATION) EVERY 4 HOURS
Status: DISCONTINUED | OUTPATIENT
Start: 2022-07-31 | End: 2022-08-02

## 2022-07-31 RX ORDER — PROPOFOL 10 MG/ML
5-50 INJECTION, EMULSION INTRAVENOUS CONTINUOUS
Status: DISCONTINUED | OUTPATIENT
Start: 2022-07-31 | End: 2022-07-31

## 2022-07-31 RX ORDER — BUDESONIDE 0.5 MG/2ML
0.5 INHALANT ORAL 2 TIMES DAILY
Status: DISCONTINUED | OUTPATIENT
Start: 2022-07-31 | End: 2022-08-04 | Stop reason: HOSPADM

## 2022-07-31 RX ORDER — LORAZEPAM 2 MG/ML
1 INJECTION INTRAMUSCULAR EVERY 5 MIN PRN
Status: DISCONTINUED | OUTPATIENT
Start: 2022-07-31 | End: 2022-07-31

## 2022-07-31 RX ORDER — NICOTINE 21 MG/24HR
1 PATCH, TRANSDERMAL 24 HOURS TRANSDERMAL DAILY
Status: DISCONTINUED | OUTPATIENT
Start: 2022-07-31 | End: 2022-08-04 | Stop reason: HOSPADM

## 2022-07-31 RX ORDER — CHLORHEXIDINE GLUCONATE 0.12 MG/ML
15 RINSE ORAL 2 TIMES DAILY
Status: DISCONTINUED | OUTPATIENT
Start: 2022-07-31 | End: 2022-08-02

## 2022-07-31 RX ORDER — MIDAZOLAM HYDROCHLORIDE 2 MG/2ML
2 INJECTION, SOLUTION INTRAMUSCULAR; INTRAVENOUS EVERY 5 MIN PRN
Status: DISCONTINUED | OUTPATIENT
Start: 2022-07-31 | End: 2022-08-02

## 2022-07-31 RX ORDER — ARFORMOTEROL TARTRATE 15 UG/2ML
15 SOLUTION RESPIRATORY (INHALATION) 2 TIMES DAILY
Status: DISCONTINUED | OUTPATIENT
Start: 2022-07-31 | End: 2022-08-04 | Stop reason: HOSPADM

## 2022-07-31 RX ORDER — LEVETIRACETAM 5 MG/ML
500 INJECTION INTRAVASCULAR EVERY 12 HOURS
Status: DISCONTINUED | OUTPATIENT
Start: 2022-07-31 | End: 2022-08-03

## 2022-07-31 RX ORDER — MAGNESIUM SULFATE 1 G/100ML
1000 INJECTION INTRAVENOUS ONCE
Status: COMPLETED | OUTPATIENT
Start: 2022-07-31 | End: 2022-07-31

## 2022-07-31 RX ADMIN — IPRATROPIUM BROMIDE AND ALBUTEROL SULFATE 1 AMPULE: 2.5; .5 SOLUTION RESPIRATORY (INHALATION) at 16:50

## 2022-07-31 RX ADMIN — PROPOFOL 45 MCG/KG/MIN: 10 INJECTION, EMULSION INTRAVENOUS at 01:05

## 2022-07-31 RX ADMIN — OXYCODONE HYDROCHLORIDE AND ACETAMINOPHEN 500 MG: 500 TABLET ORAL at 08:43

## 2022-07-31 RX ADMIN — ENOXAPARIN SODIUM 40 MG: 100 INJECTION SUBCUTANEOUS at 08:43

## 2022-07-31 RX ADMIN — DEXAMETHASONE SODIUM PHOSPHATE 20 MG: 4 INJECTION, SOLUTION INTRAMUSCULAR; INTRAVENOUS at 08:42

## 2022-07-31 RX ADMIN — Medication 2 MG/HR: at 00:58

## 2022-07-31 RX ADMIN — SODIUM CHLORIDE 40 MG: 9 INJECTION INTRAMUSCULAR; INTRAVENOUS; SUBCUTANEOUS at 08:44

## 2022-07-31 RX ADMIN — IPRATROPIUM BROMIDE AND ALBUTEROL SULFATE 1 AMPULE: 2.5; .5 SOLUTION RESPIRATORY (INHALATION) at 12:33

## 2022-07-31 RX ADMIN — SODIUM CHLORIDE: 9 INJECTION, SOLUTION INTRAVENOUS at 00:59

## 2022-07-31 RX ADMIN — MAGNESIUM SULFATE HEPTAHYDRATE 1000 MG: 1 INJECTION, SOLUTION INTRAVENOUS at 01:10

## 2022-07-31 RX ADMIN — Medication 2000 UNITS: at 08:43

## 2022-07-31 RX ADMIN — SODIUM CHLORIDE, PRESERVATIVE FREE 10 ML: 5 INJECTION INTRAVENOUS at 22:01

## 2022-07-31 RX ADMIN — SODIUM CHLORIDE: 9 INJECTION, SOLUTION INTRAVENOUS at 14:47

## 2022-07-31 RX ADMIN — Medication 125 MCG/HR: at 12:21

## 2022-07-31 RX ADMIN — IPRATROPIUM BROMIDE AND ALBUTEROL SULFATE 1 AMPULE: 2.5; .5 SOLUTION RESPIRATORY (INHALATION) at 20:23

## 2022-07-31 RX ADMIN — PROPOFOL 35 MCG/KG/MIN: 10 INJECTION, EMULSION INTRAVENOUS at 02:25

## 2022-07-31 RX ADMIN — PIPERACILLIN AND TAZOBACTAM 4500 MG: 4; .5 INJECTION, POWDER, FOR SOLUTION INTRAVENOUS at 03:26

## 2022-07-31 RX ADMIN — LEVETIRACETAM 500 MG: 5 INJECTION INTRAVENOUS at 06:39

## 2022-07-31 RX ADMIN — SODIUM CHLORIDE: 9 INJECTION, SOLUTION INTRAVENOUS at 21:55

## 2022-07-31 RX ADMIN — PIPERACILLIN AND TAZOBACTAM 4500 MG: 4; .5 INJECTION, POWDER, FOR SOLUTION INTRAVENOUS at 11:27

## 2022-07-31 RX ADMIN — LEVETIRACETAM 500 MG: 5 INJECTION INTRAVENOUS at 17:32

## 2022-07-31 RX ADMIN — Medication 50 MG: at 08:43

## 2022-07-31 RX ADMIN — 0.12% CHLORHEXIDINE GLUCONATE 15 ML: 1.2 RINSE ORAL at 11:26

## 2022-07-31 RX ADMIN — PIPERACILLIN AND TAZOBACTAM 4500 MG: 4; .5 INJECTION, POWDER, FOR SOLUTION INTRAVENOUS at 22:01

## 2022-07-31 RX ADMIN — BUDESONIDE 500 MCG: 0.5 SUSPENSION RESPIRATORY (INHALATION) at 20:23

## 2022-07-31 RX ADMIN — Medication 150 MCG/HR: at 02:24

## 2022-07-31 RX ADMIN — Medication 125 MCG/HR: at 21:55

## 2022-07-31 RX ADMIN — BUDESONIDE 500 MCG: 0.5 SUSPENSION RESPIRATORY (INHALATION) at 09:16

## 2022-07-31 RX ADMIN — IPRATROPIUM BROMIDE AND ALBUTEROL SULFATE 1 AMPULE: 2.5; .5 SOLUTION RESPIRATORY (INHALATION) at 09:18

## 2022-07-31 RX ADMIN — IPRATROPIUM BROMIDE AND ALBUTEROL SULFATE 1 AMPULE: 2.5; .5 SOLUTION RESPIRATORY (INHALATION) at 23:30

## 2022-07-31 RX ADMIN — ARFORMOTEROL TARTRATE 15 MCG: 15 SOLUTION RESPIRATORY (INHALATION) at 09:17

## 2022-07-31 RX ADMIN — Medication 2 MG/HR: at 21:58

## 2022-07-31 RX ADMIN — ARFORMOTEROL TARTRATE 15 MCG: 15 SOLUTION RESPIRATORY (INHALATION) at 20:23

## 2022-07-31 RX ADMIN — SODIUM CHLORIDE: 9 INJECTION, SOLUTION INTRAVENOUS at 21:40

## 2022-07-31 RX ADMIN — 0.12% CHLORHEXIDINE GLUCONATE 15 ML: 1.2 RINSE ORAL at 22:01

## 2022-07-31 ASSESSMENT — PAIN SCALES - GENERAL
PAINLEVEL_OUTOF10: 0
PAINLEVEL_OUTOF10: 1
PAINLEVEL_OUTOF10: 0

## 2022-07-31 ASSESSMENT — PULMONARY FUNCTION TESTS
PIF_VALUE: 17
PIF_VALUE: 18
PIF_VALUE: 17
PIF_VALUE: 8
PIF_VALUE: 22
PIF_VALUE: 18
PIF_VALUE: 19
PIF_VALUE: 17
PIF_VALUE: 16
PIF_VALUE: 15
PIF_VALUE: 20
PIF_VALUE: 9
PIF_VALUE: 18
PIF_VALUE: 24
PIF_VALUE: 16
PIF_VALUE: 21
PIF_VALUE: 13
PIF_VALUE: 17
PIF_VALUE: 19
PIF_VALUE: 9
PIF_VALUE: 23

## 2022-07-31 ASSESSMENT — PAIN SCALES - WONG BAKER
WONGBAKER_NUMERICALRESPONSE: 0
WONGBAKER_NUMERICALRESPONSE: 0

## 2022-07-31 NOTE — PROGRESS NOTES
Patient continues to pull at ETT when restraints are loosened. Patient verbally redirected. Bilateral wrist restraints continued. Mattie Weiner notified of patient being in bilateral wrist restraints and reason for restraints.

## 2022-07-31 NOTE — ED NOTES
RASS -2 at this time. Tolerating current fentanyl and propofol gtts.       Scott Adams RN  07/30/22 5624

## 2022-07-31 NOTE — PLAN OF CARE
Problem: Respiratory - Adult  Goal: Achieves optimal ventilation and oxygenation  7/31/2022 1851 by Beryl Alarcon RCP  Outcome: Progressing  Flowsheets (Taken 7/31/2022 1215 by Juan Diamond RN)  Achieves optimal ventilation and oxygenation: Assess for changes in respiratory status

## 2022-07-31 NOTE — PROGRESS NOTES
[facility] Neurodiagnostic Report    MRN: [id number]   PATIENT NAME: Concepción Almendarez   DATE OF REPORT: 2022  DATE OF SERVICE: 2022    PHYSICIAN NAME: Melba Ryan DO  Referring Physician: Melba Ryan DO       Patient's : 1975   Patient's Age: 49 y.o. Gender: male     PROCEDURE: Routine EEG with video      Clinical Interpretation: This abnormal study showed evidence of:    A moderate nonspecific encephalopathy    No seizures or epileptiform discharges were noted during this study. ____________________________  Electronically signed by: Melba Ryan DO, 2022 4:00 PM      Patient Clinical Information   Reason for Study: Patient undergoing evaluation for possible provoked seizure  Patient State: Somnolent  Primary neurological diagnosis: Spell of uncertain etiology   Primary indication for monitoring: Characterization of spell    Pertinent Medications and Treatments    levetiracetam     midazolam     fentanyl     Sedatives administered: Yes  Intubated: Yes  Pharmacological paralytic: No    Reporting Period  Start of Study: , 2022   End of Study:  , 2022       EEG Description  Digital video and scalp EEG monitoring was performed using the standard protocol for this laboratory. Scalp electrodes were applied in the international 10/20 system. Multiple digital montage arrangements were utilized for evaluation. EKG and video were recorded. Background:      Occipital rhythm (posterior dominant rhythm or PDR): Absent    Voltage: Medium   Organization: poor to fair  Reactivity to eye opening/closure: Not tested    Drowsiness: Present - abnormal, mildly excessive generalized irregular delta activity noted  Sleep: Absent    Comments: The background is composed primarily of generalized irregular theta and delta activity with some shifting emphasis between the bilateral frontotemporal regions.  Some superimposed low amplitude generalized irregular beta activity noted consistent with medication effect. Technical and Activation Procedures:  Hyperventilation: Not done        Photic stimulation: Not done        Reactivity to stimulation: Yes    Abnormalities:    I. Seizures? No    II. Rhythmic or Periodic Patterns? No    III. Other Abnormalities?         No

## 2022-07-31 NOTE — CONSULTS
Continuous Lavonne MARYANN GoN - CNP 2 mL/hr at 07/31/22 0600 2 mg/hr at 07/31/22 0600    midazolam PF (VERSED) injection 2 mg  2 mg IntraVENous Q5 Min PRN Lavonne Go APRN [place] CNP        budesonide (PULMICORT) nebulizer suspension 500 mcg  0.5 mg Nebulization BID Lavonne Go, APRN - CNP   500 mcg at 07/31/22 0916    Arformoterol Tartrate (BROVANA) nebulizer solution 15 mcg  15 mcg Nebulization BID Lavonne Go APRN - CNP   15 mcg at 07/31/22 0917    ipratropium-albuterol (DUONEB) nebulizer solution 1 ampule  1 ampule Inhalation Q4H SEVERINO Morris - CNP   1 ampule at 07/31/22 9211    nicotine (NICODERM CQ) 21 MG/24HR 1 patch  1 patch TransDERmal Daily SEVERINO Morris CNP        chlorhexidine (PERIDEX) 0.12 % solution 15 mL  15 mL Mouth/Throat BID SEVERINO Morris CNP        fentaNYL (SUBLIMAZE) injection 100 mcg  100 mcg IntraVENous Q1H PRN Davey Paz, DO   100 mcg at 07/30/22 1923    0.9 % sodium chloride infusion   IntraVENous Continuous SEVERINO Zheng CNP 75 mL/hr at 07/31/22 0600 Rate Verify at 07/31/22 0600    fentaNYL 10 mcg/ml in 0.9%  ml infusion   mcg/hr IntraVENous Continuous Davey Paz, DO 12.5 mL/hr at 07/31/22 0600 125 mcg/hr at 07/31/22 0600    sodium chloride flush 0.9 % injection 10 mL  10 mL IntraVENous 2 times per day Davey Paz, DO   10 mL at 07/30/22 2158    0.9 % sodium chloride infusion   IntraVENous PRN Davey Paz, DO   Stopped at 07/31/22 0326    enoxaparin (LOVENOX) injection 40 mg  40 mg SubCUTAneous Daily Davey Paz, DO   40 mg at 07/31/22 0843    promethazine (PHENERGAN) tablet 12.5 mg  12.5 mg Oral Q6H PRN Davey Paz DO        Or    ondansetron [facility]) injection 4 mg  4 mg IntraVENous Q6H PRN Davey Paz, DO        polyethylene glycol (GLYCOLAX) packet 17 g  17 g Oral Daily PRN Davey Paz,         piperacillin-tazobactam (ZOSYN) 4,500 mg in dextrose 5 % 100 mL IVPB ([id number])  4,500 mg IntraVENous Q8H Van Velia, DO   Stopped at 07/31/22 0839    sodium chloride flush 0.9 % injection 5-40 mL  5-40 mL IntraVENous 2 times per day Doc More, APRN - CNP   10 mL at 07/30/22 2158    sodium chloride flush 0.9 % injection 5-40 mL  5-40 mL IntraVENous PRN Doc More, APRN - CNP        acetaminophen (TYLENOL) suppository 650 mg  650 mg Rectal Q6H PRN Doc More, APRN - CNP        pantoprazole (PROTONIX) 40 mg in sodium chloride (PF) 10 mL injection  40 mg IntraVENous Daily Lexy Donahue, APRN - CNP   40 mg at 07/31/22 0844    dexamethasone (DECADRON) 20 mg in sodium chloride 0.9 % 50 mL IVPB  20 mg IntraVENous Q24H Lexy Donahue, APRN -  mL/hr at 07/31/22 0842 20 mg at 07/31/22 0842    Followed by    Deepa Munguia ON 8/5/2022] dexamethasone (DECADRON) injection 10 mg  10 mg IntraVENous Q24H Lexy Donahue, APRN - CNP        vitamin D (CHOLECALCIFEROL) tablet 2,000 Units  2,000 Units Oral Daily Lexy Donahue, APRN - CNP   2,000 Units at 07/31/22 6093    ascorbic acid (VITAMIN C) tablet 500 mg  500 mg Oral Daily Doc More, APRN - CNP   500 mg at 07/31/22 0843    zinc sulfate (ZINCATE) capsule 50 mg  50 mg Oral Daily Lexy Donahue, APRN - CNP   50 mg at 07/31/22 0843        Allergies:      No Known Allergies     Physical Examination  Vitals   Vitals:    07/31/22 0900 07/31/22 0916 07/31/22 1000 07/31/22 1100   BP: 114/67  119/66 116/68   Pulse: 79 81 92 85   Resp: 15 18 15 14   Temp:   98.4 °F (36.9 °C)    TempSrc:   Bladder    SpO2:  100%     Weight:       Height:            General: Patient appears in no acute distress. Somnolent, intubated. HEENT: Normocephalic, atraumatic  Chest: Diminished breath sounds bilaterally  Heart: No murmurs appreciated  Extremities/Peripheral vascular: No edema/swelling noted. No cold limbs noted.     Neurologic Examination  Mental Status  Will open eyes in response to name call or loud clap  With eyes open, does not track or visually attend to examiner or environment  Does not follow commands  No response to sternal rub    Cranial Nerves  Visual fields intact bilaterally  Pupils 3 mm, do react to light. EOMI  Face appears symmetric  Hearing intact to voice    Motor  Moves all four limbs with 4+ strength grossly    Sensation   Intact to light touch in all four limbs    Reflexes:       Right Left   Biceps 2 2   Brachioradialis 2 2   Quadriceps 2 2   Achilles 1 1   Ankle clonus absent absent   Babinski absent absent            Labs  Recent Labs     07/30/22  1725 07/30/22  2230 07/31/22  0527 07/31/22  0536      < > 138  --    K 4.8   < > 4.4  --       < > 108*  --    CO2 23   < > 21*  --    BUN 10   < > 11  --    CREATININE 1.0   < > 0.9  --    GLUCOSE 79   < > 127*  --    CALCIUM 9.7   < > 8.1*  --    PROT 7.8   < > 6.5  --    LABALBU 4.3   < > 3.6  --    BILITOT 0.3   < > 0.3  --    ALKPHOS 111   < > 97  --    AST 29   < > 22  --    ALT 36   < > 34  --    WBC 6.6   < > 9.0  --    RBC 5.30   < > 4.60  --    HGB 15.3   < > 13.0  --    HCT 45.8   < > 39.7  --    MCV 86.4   < > 86.3  --    MCH 28.9   < > 28.3  --    MCHC 33.4   < > 32.7  --    RDW 13.3   < > 13.7  --       < > 157  --    MPV 11.0   < > 10.4  --    PH  --    < >  --  7.350   PO2  --    < >  --  130.4*   PCO2  --    < >  --  41.9   HCO3  --    < >  --  22.6   BE  --    < >  --  -2.9   O2SAT  --    < >  --  98.5   LACTA  --   --  0.8  --    SEDRATE 5  --   --   --    CRP  --    < > 1.1*  --     < > = values in this interval not displayed. Imaging  XR ABDOMEN FOR NG/OG/NE TUBE PLACEMENT   Final Result   Adequately positioned nasogastric tube. XR CHEST PORTABLE   Final Result   Atelectasis/infiltrates in the right lower lobe and right middle lobe   concerning for pneumonia. CT HEAD WO CONTRAST   Final Result   No acute intracranial abnormality. Soft tissue density completely filling the nasal cavity and nasopharynx   probably inflammatory/secretions.   Malignancy has to be excluded and direct   visualization recommended. CTA chest.      Comparison September 4, 2014      Findings      The heart and the great vessels are normal.  Nonenlarged mediastinal and   hilar lymph nodes are present. Trachea and major bronchi are patent. There   are no filling defects in the main pulmonary artery and the central branches. Says mucous plugging is identified in the lower lobe bronchi bilaterally. There is atelectasis/infiltrates in the right lower lobe, right middle lobe   and to a lesser extent in the left lower lobe. There is no pleural effusion. Impression      There is no central pulmonary embolism or aortic dissection. Mucous plugging in the lower lobes bilaterally with atelectasis/infiltrates   in the lower lobes more on the right side and right middle lobe concerning   for pneumonia. CT abdomen pelvis. The liver is of normal architecture. Gallbladder is partially distended. Spleen, pancreas, and the adrenals are normal.  There is heterogeneous   enhancement of the kidneys with the areas of decreased attenuation concerning   for pyelonephritis. Dilated fluid-filled small bowel loops are noted likely   ileus/enteritis with small bowel loops measuring up to 2 cm. Pelvis. Bladder is partially distended with the Hampton catheter and the wall   thickening. Colon is normal.  The appendix is partially identified and   appears normal.      Impression      Heterogeneous enhancement of the kidneys concerning for multifocal   pyelonephritis. There is also thickening of the urinary bladder. Please   correlate with urinalysis. Dilated fluid-filled small bowel loops likely ileus/enteritis. CTA PULMONARY W CONTRAST   Final Result   No acute intracranial abnormality. Soft tissue density completely filling the nasal cavity and nasopharynx   probably inflammatory/secretions.   Malignancy has to be excluded and direct   visualization recommended. CTA chest.      Comparison September 4, 2014      Findings      The heart and the great vessels are normal.  Nonenlarged mediastinal and   hilar lymph nodes are present. Trachea and major bronchi are patent. There   are no filling defects in the main pulmonary artery and the central branches. Says mucous plugging is identified in the lower lobe bronchi bilaterally. There is atelectasis/infiltrates in the right lower lobe, right middle lobe   and to a lesser extent in the left lower lobe. There is no pleural effusion. Impression      There is no central pulmonary embolism or aortic dissection. Mucous plugging in the lower lobes bilaterally with atelectasis/infiltrates   in the lower lobes more on the right side and right middle lobe concerning   for pneumonia. CT abdomen pelvis. The liver is of normal architecture. Gallbladder is partially distended. Spleen, pancreas, and the adrenals are normal.  There is heterogeneous   enhancement of the kidneys with the areas of decreased attenuation concerning   for pyelonephritis. Dilated fluid-filled small bowel loops are noted likely   ileus/enteritis with small bowel loops measuring up to 2 cm. Pelvis. Bladder is partially distended with the Hampton catheter and the wall   thickening. Colon is normal.  The appendix is partially identified and   appears normal.      Impression      Heterogeneous enhancement of the kidneys concerning for multifocal   pyelonephritis. There is also thickening of the urinary bladder. Please   correlate with urinalysis. Dilated fluid-filled small bowel loops likely ileus/enteritis. CT ABDOMEN PELVIS W IV CONTRAST Additional Contrast? None   Final Result   No acute intracranial abnormality. Soft tissue density completely filling the nasal cavity and nasopharynx   probably inflammatory/secretions. Malignancy has to be excluded and direct   visualization recommended.       CTA chest.      Comparison September 4, 2014      Findings      The heart and the great vessels are normal.  Nonenlarged mediastinal and   hilar lymph nodes are present. Trachea and major bronchi are patent. There   are no filling defects in the main pulmonary artery and the central branches. Says mucous plugging is identified in the lower lobe bronchi bilaterally. There is atelectasis/infiltrates in the right lower lobe, right middle lobe   and to a lesser extent in the left lower lobe. There is no pleural effusion. Impression      There is no central pulmonary embolism or aortic dissection. Mucous plugging in the lower lobes bilaterally with atelectasis/infiltrates   in the lower lobes more on the right side and right middle lobe concerning   for pneumonia. CT abdomen pelvis. The liver is of normal architecture. Gallbladder is partially distended. Spleen, pancreas, and the adrenals are normal.  There is heterogeneous   enhancement of the kidneys with the areas of decreased attenuation concerning   for pyelonephritis. Dilated fluid-filled small bowel loops are noted likely   ileus/enteritis with small bowel loops measuring up to 2 cm. Pelvis. Bladder is partially distended with the Hampton catheter and the wall   thickening. Colon is normal.  The appendix is partially identified and   appears normal.      Impression      Heterogeneous enhancement of the kidneys concerning for multifocal   pyelonephritis. There is also thickening of the urinary bladder. Please   correlate with urinalysis. Dilated fluid-filled small bowel loops likely ileus/enteritis.          XR CHEST PORTABLE    (Results Pending)   XR CHEST PORTABLE    (Results Pending)   MRI BRAIN WO CONTRAST    (Results Pending)         Electronically signed by Gregory Sarmiento DO on 7/31/2022 at 11:08 AM

## 2022-07-31 NOTE — PROGRESS NOTES
Patient arrived to unit at this time. Patient very agitated and restless in bed. Patient came up from ED with propofol at 40 and fentanyl at 100. NP at bedside to assess.

## 2022-07-31 NOTE — CONSULTS
Department of Orthopedic Surgery  Resident Consult Note          Reason for Consult:  Hx of R rotator cuff tear    HISTORY OF PRESENT ILLNESS:       Patient is a 49 y.o. male originally presented to the hospital for the chief complaint of shortness of breath and chest pain. Was found to have COVID-19. Subsequently intubated for GCS of 3. Currently resides in MICU. Orthopedic surgery was consulted for a history of a right sided rotator cuff tear. Reviewed with the patient the room as stated he is intubated, however will nod to questioning. States that shoulder pain has been going on for some time upon questioning, denies any recent falls or trauma, worsening of the shoulder pain. Patient was originally seen by Dr. Terra Garcia in 2017 for right-sided shoulder pain, his primary care provider ordered an MRI which showed a full-thickness supraspinatus tear. No follow-up exam notes from orthopedics were found in the chart. Denies numbness/tingling/paresthesias. Denies any other orthopedic complaints at this time.       Past Medical History:        Diagnosis Date    Bimalleolar fracture of left ankle 9/4/2014     Past Surgical History:        Procedure Laterality Date    ANKLE FRACTURE SURGERY Left 2011    FRACTURE SURGERY  9-18-14    ORIF Left Medial Maleous     Current Medications:   Current Facility-Administered Medications: levETIRAcetam (KEPPRA) 500 mg/100 mL IVPB, 500 mg, IntraVENous, Q12H  midazolam (VERSED) 1 mg/mL in D5W infusion, 1-10 mg/hr, IntraVENous, Continuous  midazolam PF (VERSED) injection 2 mg, 2 mg, IntraVENous, Q5 Min PRN  budesonide (PULMICORT) nebulizer suspension 500 mcg, 0.5 mg, Nebulization, BID  Arformoterol Tartrate (BROVANA) nebulizer solution 15 mcg, 15 mcg, Nebulization, BID  ipratropium-albuterol (DUONEB) nebulizer solution 1 ampule, 1 ampule, Inhalation, Q4H  nicotine (NICODERM CQ) 21 MG/24HR 1 patch, 1 patch, TransDERmal, Daily  chlorhexidine (PERIDEX) 0.12 % solution 15 mL, SYSTEMS:  Unable to obtain due to patient status    PHYSICAL EXAM:    VITALS:  /68   Pulse 85   Temp 98.4 °F (36.9 °C) (Bladder)   Resp 14   Ht 6' 2\" (1.88 m)   Wt 150 lb 2.1 oz (68.1 kg)   SpO2 100%   BMI 19.28 kg/m²   CONSTITUTIONAL: Intubated, no acute distress, nods head to questioning  MUSCULOSKELETAL:  Right upper Extremity:  Skin examination reveals no superficial lacerations or abrasions. No right shoulder effusion noted. No open wounds. No erythema or ecchymosis noted. TTP over the anterior aspect of the shoulder. No tenderness palpation of the clavicle, arm, elbow, forearm, wrist, fingers, hand  Sensation intact light touch distally in median, radial, ulnar nerve distributions. Motor grossly intact distally in AIN, PIN, median, radial, ulnar nerve distributions to the hand. Compartment soft and compressible  +2/4 radial ulnar pulses, brisk cap refill, hand warm well-perfused    Secondary Exam:   leftUE: No obvious signs of trauma. -TTP to fingers, hand, wrist, forearm, elbow, humerus, shoulder or clavicle. Hand warm and perfused, +2 pulses, sensation and motor function grossly intact in medial, radial, ulnar, AIN and PIN. Compartments soft and compressible. Dermatomes C4-T1 grossly intact. bilateralLE: No obvious signs of trauma. Minor tenderness to palpation over the posterior superior aspect of the left gluteal musculature. -TTP to foot, ankle, leg, knee, thigh, L hip. Feet warm and perfused, +2 distal pulses, sensation and motor function grossly intact in sural, saphenous, tibial and peroneals. L3-S2 dermatomes grossly intact. Compartments soft and compressible.      Pelvis: -TTP, -Log roll, -Heel strike     DATA:    CBC:   Lab Results   Component Value Date/Time    WBC 9.0 07/31/2022 05:27 AM    RBC 4.60 07/31/2022 05:27 AM    HGB 13.0 07/31/2022 05:27 AM    HCT 39.7 07/31/2022 05:27 AM    MCV 86.3 07/31/2022 05:27 AM    MCH 28.3 07/31/2022 05:27 AM    MCHC 32.7 07/31/2022 05:27 AM    RDW 13.7 07/31/2022 05:27 AM     07/31/2022 05:27 AM    MPV 10.4 07/31/2022 05:27 AM     PT/INR:    Lab Results   Component Value Date/Time    PROTIME 15.8 07/31/2022 05:27 AM    INR 1.4 07/31/2022 05:27 AM       Radiology Review:  3 views of the right shoulder reviewed demonstrating no acute fractures or dislocations. There is the beginning stage of rotator cuff arthropathy with superior migration of the humeral head and degenerative changes on the inferior aspect of the acromion. AP pelvis 2 views left hip reviewed demonstrating no acute fractures or dislocations. Mild to moderate degenerative changes noted. MRI from 201 of the right shoulder reviewed demonstrating full thickness tear of the supraspinatus and degenerative changes of the labrum. IMPRESSION:  Closed, left chronic supraspinatus tear  Left hip OA    PLAN:  WBAT RUE  WBAT LLE  Pain control per primary  Medical mgmt per primary   DVT prophy ok from ortho perspective, per primary  No acute orthopedic intervention planned, ok to follow up as an outpatient once discharged for further treatment of R supraspinatus tear and L hip OA  PT/OT as able  All patient/family questions have been answered and patient is currently agreeable to plan.   Discuss with Attending      Electronically signed by Mayra Garcia DO on 7/31/2022 at 11:41 AM

## 2022-07-31 NOTE — PROGRESS NOTES
[address] [facility]   Department of Internal Medicine   MICU Progress Note    Patient:  Gerson Trevino 49 y.o. male   MRN: [id number]       Date of Service: 2022    Allergy: Patient has no known allergies. Subjective     22  Intubated, sedated on the vent (Vent Day 1)  No seizure activity overnight  VSS, propofol stopped for hypotension, Versed started. Febrile overnight, max temp 100.8  Possible MRI today    Objective     TEMPERATURE:  Current - Temp: 100 °F (37.8 °C); Max - Temp  Av °F (37.8 °C)  Min: 99.3 °F (37.4 °C)  Max: 100.8 °F (38.2 °C)    RESPIRATIONS RANGE: Resp  Av.2  Min: 14  Max: 29    PULSE RANGE: Pulse  Av.1  Min: 76  Max: 106    BLOOD PRESSURE RANGE:  Systolic (03AIC), CMY:186 , Min:94 , YVM:661   ; Diastolic (96RUN), BZD:93, Min:55, Max:100      PULSE OXIMETRY RANGE: SpO2  Av.2 %  Min: 88 %  Max: 100 %    I & O - 24hr:    Intake/Output Summary (Last 24 hours) at 2022 0823  Last data filed at 2022 0600  Gross per 24 hour   Intake 2049.26 ml   Output 795 ml   Net 1254.26 ml     I/O last 3 completed shifts: In: 2049.3 [I.V.:1985.6; IV Piggyback:63.7]  Out: 795 [Urine:795] No intake/output data recorded. Weight change:     Physical Exam:  General Appearance: Intubated, sedated, follows commands  HEENT:  ETT to vent, PERRLA, minimal secretions, dry mucous membranes  Lung: Lung sounds diminished bilaterally, scattered rhonchi  Heart: S1/S2, RRR, pulses palpable, no edema  Abdomen: soft, non-tender, hypoactive bowel sounds  Extremities:  No edema, following commands to thumbs up and wiggles toes. Musculokeletal: No joint swelling, no muscle tenderness. ROM normal in all joints of extremities. Neurologic: Mental status: intubated, sedated. Follows commands.       Medications     Continuous Infusions:   midazolam 2 mg/hr (22 0600)    propofol Stopped (22 0540)    sodium chloride 75 mL/hr at 2200    fentaNYL 10 mcg/ml in 0.9%  ml infusion 125 mcg/hr (07/31/22 0600)    sodium chloride Stopped (07/31/22 0326)     Scheduled Meds:   levETIRAcetam  500 mg IntraVENous Q12H    budesonide  0.5 mg Nebulization BID    Arformoterol Tartrate  15 mcg Nebulization BID    sodium chloride flush  10 mL IntraVENous 2 times per day    enoxaparin  40 mg SubCUTAneous Daily    piperacillin-tazobactam  4,500 mg IntraVENous Q8H    sodium chloride flush  5-40 mL IntraVENous 2 times per day    pantoprazole (PROTONIX) 40 mg injection  40 mg IntraVENous Daily    dexamethasone  20 mg IntraVENous Q24H    Followed by    Eduardo Nova ON 8/5/2022] dexamethasone  10 mg IntraVENous Q24H    vitamin D  2,000 Units Oral Daily    ascorbic acid  500 mg Oral Daily    zinc sulfate  50 mg Oral Daily     PRN Meds: midazolam, fentanNYL, sodium chloride, promethazine **OR** ondansetron, polyethylene glycol, sodium chloride flush, [DISCONTINUED] acetaminophen **OR** acetaminophen  Nutrition:   NG/OG tube: NPO    Labs and Imaging Studies     CBC:   Recent Labs     07/30/22 1725 07/30/22 2230 07/31/22  0527   WBC 6.6 8.0 9.0   RBC 5.30 4.75 4.60   HGB 15.3 13.8 13.0   HCT 45.8 41.6 39.7   MCV 86.4 87.6 86.3   MCH 28.9 29.1 28.3   MCHC 33.4 33.2 32.7   RDW 13.3 13.4 13.7    169 157   MPV 11.0 10.7 10.4       BMP:    Recent Labs     07/30/22 1725 07/30/22 2230 07/31/22  0527    136 138   K 4.8 4.2 4.4    104 108*   CO2 23 23 21*   BUN 10 9 11   CREATININE 1.0 0.8 0.9   GLUCOSE 79 99 127*   CALCIUM 9.7 7.9* 8.1*   PROT 7.8 6.5 6.5   LABALBU 4.3 3.5 3.6   BILITOT 0.3 0.3 0.3   ALKPHOS 111 104 97   AST 29 34 22   ALT 36 39 34       LIVER PROFILE:   Recent Labs     07/30/22 1725 07/30/22 2230 07/31/22  0527   AST 29 34 22   ALT 36 39 34   BILITOT 0.3 0.3 0.3   ALKPHOS 111 104 97       PT/INR:   Recent Labs     07/30/22  1725 07/31/22 0527   PROTIME 14.3* 15.8*   INR 1.3 1.4       APTT:   Recent Labs     07/30/22  1725 07/31/22 0527   APTT 32.4 32.6       Fasting Lipid Panel:    No results found for: CHOL, TRIG, HDL    Cardiac Enzymes:    No results found for: CKTOTAL, CKMB, CKMBINDEX, TROPONINI    Notable Cultures:      Blood cultures No results found for: BC  Respiratory cultures No results found for: RESPCULTURE No results found for: LABGRAM  Urine No results found for: LABURIN  Legionella No results found for: LABLEGI  C Diff PCR No results found for: CDIFPCR  Wound culture/abscess: No results for input(s): WNDABS in the last 72 hours. Tip culture:No results for input(s): CXCATHTIP in the last 72 hours. Antibiotic  Days  Day started   Zosyn 2 7/30/22                           Oxygen:     Vent Information  Ventilator ID: [id number]    Additional Respiratory Assessments  Heart Rate: 81  Resp: 15  SpO2: 100 %  Position: Semi-Sosa's  Humidification Source: Heated wire  Humidification Temp: 37       ABG     PH  7.35   PCO2  41.9   PO2  130.4   HCO3  22.6   Sat%  98.5   FIO2  40%   DATES 7/31/22       Lines:  Site  Day  Date inserted     TLC              PICC              Arterial line              Peripheral line   R FA/ R Wrist 2   7/30/22     HD cath            Urinary Catheter-Output (mL): 60 mL    Imaging Studies:    XR ABDOMEN FOR NG/OG/NE TUBE PLACEMENT   Final Result   Adequately positioned nasogastric tube. XR CHEST PORTABLE   Final Result   Atelectasis/infiltrates in the right lower lobe and right middle lobe   concerning for pneumonia. CT HEAD WO CONTRAST   Final Result   No acute intracranial abnormality. Soft tissue density completely filling the nasal cavity and nasopharynx   probably inflammatory/secretions. Malignancy has to be excluded and direct   visualization recommended. CTA chest.      Comparison September 4, 2014      Findings      The heart and the great vessels are normal.  Nonenlarged mediastinal and   hilar lymph nodes are present. Trachea and major bronchi are patent.   There   are no filling defects in the main pulmonary is identified in the lower lobe bronchi bilaterally. There is atelectasis/infiltrates in the right lower lobe, right middle lobe   and to a lesser extent in the left lower lobe. There is no pleural effusion. Impression      There is no central pulmonary embolism or aortic dissection. Mucous plugging in the lower lobes bilaterally with atelectasis/infiltrates   in the lower lobes more on the right side and right middle lobe concerning   for pneumonia. CT abdomen pelvis. The liver is of normal architecture. Gallbladder is partially distended. Spleen, pancreas, and the adrenals are normal.  There is heterogeneous   enhancement of the kidneys with the areas of decreased attenuation concerning   for pyelonephritis. Dilated fluid-filled small bowel loops are noted likely   ileus/enteritis with small bowel loops measuring up to 2 cm. Pelvis. Bladder is partially distended with the Hampton catheter and the wall   thickening. Colon is normal.  The appendix is partially identified and   appears normal.      Impression      Heterogeneous enhancement of the kidneys concerning for multifocal   pyelonephritis. There is also thickening of the urinary bladder. Please   correlate with urinalysis. Dilated fluid-filled small bowel loops likely ileus/enteritis. CT ABDOMEN PELVIS W IV CONTRAST Additional Contrast? None   Final Result   No acute intracranial abnormality. Soft tissue density completely filling the nasal cavity and nasopharynx   probably inflammatory/secretions. Malignancy has to be excluded and direct   visualization recommended. CTA chest.      Comparison September 4, 2014      Findings      The heart and the great vessels are normal.  Nonenlarged mediastinal and   hilar lymph nodes are present. Trachea and major bronchi are patent. There   are no filling defects in the main pulmonary artery and the central branches.    Says mucous plugging is identified in the lower lobe bronchi bilaterally. There is atelectasis/infiltrates in the right lower lobe, right middle lobe   and to a lesser extent in the left lower lobe. There is no pleural effusion. Impression      There is no central pulmonary embolism or aortic dissection. Mucous plugging in the lower lobes bilaterally with atelectasis/infiltrates   in the lower lobes more on the right side and right middle lobe concerning   for pneumonia. CT abdomen pelvis. The liver is of normal architecture. Gallbladder is partially distended. Spleen, pancreas, and the adrenals are normal.  There is heterogeneous   enhancement of the kidneys with the areas of decreased attenuation concerning   for pyelonephritis. Dilated fluid-filled small bowel loops are noted likely   ileus/enteritis with small bowel loops measuring up to 2 cm. Pelvis. Bladder is partially distended with the Hampton catheter and the wall   thickening. Colon is normal.  The appendix is partially identified and   appears normal.      Impression      Heterogeneous enhancement of the kidneys concerning for multifocal   pyelonephritis. There is also thickening of the urinary bladder. Please   correlate with urinalysis. Dilated fluid-filled small bowel loops likely ileus/enteritis. XR CHEST PORTABLE    (Results Pending)   XR CHEST PORTABLE    (Results Pending)   MRI BRAIN WO CONTRAST    (Results Pending)        EKG: Rhythm Strip: normal sinus rhythm, unchanged from previous tracings.     APRN- CNP Assessment and PLan   Assessment and Plan:  Acute Metabolic Encephalopathy,   Seizure disorder, new onset  -AMS in the ED, possible seizure activity, CT no acute process  -Continue Levetiracetam   -Neurology consulted, to obtain MRI and EEG   -Patient is following commands   -Wean sedation for possible extubation this afternoon   -PRN versed for seizure activity   -Continue seizure precautions    Acute hypoxic respiratory failure, 2/2

## 2022-07-31 NOTE — PLAN OF CARE
Problem: Discharge Planning  Goal: Discharge to home or other facility with appropriate resources  7/31/2022 0247 by Victor M Bhat RN  Outcome: Not Progressing  7/31/2022 0242 by Victor M Bhat RN  Outcome: Not Progressing  Flowsheets (Taken 7/31/2022 0200)  Discharge to home or other facility with appropriate resources: Identify barriers to discharge with patient and caregiver     Problem: Musculoskeletal - Adult  Goal: Return mobility to safest level of function  7/31/2022 0247 by Victor M Bhat RN  Outcome: Not Progressing  7/31/2022 0242 by Victor M Bhat RN  Outcome: Not Progressing  Goal: Return ADL status to a safe level of function  7/31/2022 0247 by Victor M Bhat RN  Outcome: Not Progressing  7/31/2022 0242 by Victor M Bhat RN  Outcome: Not Progressing     Problem: Safety - Medical Restraint  Goal: Remains free of injury from restraints (Restraint for Interference with Medical Device)  Description: INTERVENTIONS:  1. Determine that other, less restrictive measures have been tried or would not be effective before applying the restraint  2. Evaluate the patient's condition at the time of restraint application  3. Inform patient/family regarding the reason for restraint  4.  Q2H: Monitor safety, psychosocial status, comfort, nutrition and hydration  7/31/2022 0247 by Victor M Bhat RN  Outcome: Progressing  7/31/2022 0242 by Victor M Bhat RN  Outcome: Progressing  Flowsheets (Taken 7/30/2022 2200)  Remains free of injury from restraints (restraint for interference with medical device):   Determine that other, less restrictive measures have been tried or would not be effective before applying the restraint   Evaluate the patient's condition at the time of restraint application   Inform patient/family regarding the reason for restraint   Every 2 hours: Monitor safety, psychosocial status, comfort, nutrition and hydration     Problem: Pain  Goal: Verbalizes/displays adequate comfort level or baseline comfort level  7/31/2022 0247 by Daniel Rodgers RN  Outcome: Progressing  7/31/2022 0242 by Daniel Rodgers, RN  Outcome: Progressing     Problem: Neurosensory - Adult  Goal: Achieves stable or improved neurological status  7/31/2022 0247 by Daniel Rodgers RN  Outcome: Progressing  7/31/2022 0242 by Daniel Rodgers, RN  Outcome: Progressing  Flowsheets (Taken 7/31/2022 0200)  Achieves stable or improved neurological status:   Assess for and report changes in neurological status   Initiate measures to prevent increased intracranial pressure   Maintain blood pressure and fluid volume within ordered parameters to optimize cerebral perfusion and minimize risk of hemorrhage   Monitor temperature, glucose, and sodium. Initiate appropriate interventions as ordered  Goal: Absence of seizures  7/31/2022 0247 by Daniel Rodgers RN  Outcome: Progressing  7/31/2022 0242 by Daniel Rodgers RN  Outcome: Progressing  Flowsheets (Taken 7/31/2022 0200)  Absence of seizures: Monitor for seizure activity.   If seizure occurs, document type and location of movements and any associated apnea     Problem: Respiratory - Adult  Goal: Achieves optimal ventilation and oxygenation  7/31/2022 0247 by Daniel Rodgers RN  Outcome: Progressing  7/31/2022 0242 by Daniel Rodgers RN  Outcome: Progressing  Flowsheets (Taken 7/31/2022 0200)  Achieves optimal ventilation and oxygenation:   Assess for changes in respiratory status   Assess for changes in mentation and behavior   Position to facilitate oxygenation and minimize respiratory effort   Oxygen supplementation based on oxygen saturation or arterial blood gases   Assess the need for suctioning and aspirate as needed   Respiratory therapy support as indicated     Problem: Cardiovascular - Adult  Goal: Maintains optimal cardiac output and hemodynamic stability  7/31/2022 0247 by Daniel Rodgers RN  Outcome: Progressing  7/31/2022 0242 by Daniel Rodgers RN  Outcome: Progressing  Flowsheets (Taken 7/31/2022 0200)  Maintains optimal cardiac output and hemodynamic stability:   Monitor blood pressure and heart rate   Monitor urine output and notify Licensed Independent Practitioner for values outside of normal range  Goal: Absence of cardiac dysrhythmias or at baseline  7/31/2022 0247 by Jacqueline Gonzalez RN  Outcome: Progressing  7/31/2022 0242 by Jacqueline Gonzalez RN  Outcome: Progressing  Flowsheets (Taken 7/31/2022 0200)  Absence of cardiac dysrhythmias or at baseline:   Monitor cardiac rate and rhythm   Assess for signs of decreased cardiac output     Problem: Skin/Tissue Integrity - Adult  Goal: Skin integrity remains intact  7/31/2022 0247 by Jacqueline Gonzalez RN  Outcome: Progressing  7/31/2022 0242 by Jacqueline Gonzalez RN  Outcome: Progressing  Flowsheets  Taken 7/31/2022 0200  Skin Integrity Remains Intact:   Monitor for areas of redness and/or skin breakdown   Assess vascular access sites hourly  Taken 7/31/2022 0000  Skin Integrity Remains Intact:   Monitor for areas of redness and/or skin breakdown   Assess vascular access sites hourly  Goal: Oral mucous membranes remain intact  7/31/2022 0247 by Jacqueline Gonzalez RN  Outcome: Progressing  7/31/2022 0242 by Jacqueline Gonzalez RN  Outcome: Progressing     Problem: Gastrointestinal - Adult  Goal: Minimal or absence of nausea and vomiting  7/31/2022 0247 by Jacqueline Gonzalez RN  Outcome: Progressing  7/31/2022 0242 by Jacqueline Gonzalez RN  Outcome: Progressing  Flowsheets (Taken 7/31/2022 0200)  Minimal or absence of nausea and vomiting: Administer IV fluids as ordered to ensure adequate hydration  Goal: Maintains or returns to baseline bowel function  7/31/2022 0247 by Jacqueline Gonzalez RN  Outcome: Progressing  7/31/2022 0242 by Jacqueline Gonzalez RN  Outcome: Progressing  Flowsheets (Taken 7/31/2022 0200)  Maintains or returns to baseline bowel function: Assess bowel function  Goal: Maintains adequate nutritional intake  7/31/2022 0247 by Jacqueline Gonzalez RN  Outcome: Progressing  7/31/2022 2151 by Perry Hawthorne RN  Outcome: Not Progressing     Problem: Genitourinary - Adult  Goal: Urinary catheter remains patent  7/31/2022 0247 by Perry Hawthorne RN  Outcome: Progressing  7/31/2022 0242 by Perry Hawthorne RN  Outcome: Progressing  Flowsheets (Taken 7/31/2022 0200)  Urinary catheter remains patent: Assess patency of urinary catheter     Problem: Infection - Adult  Goal: Absence of infection at discharge  7/31/2022 0247 by Perry Hawthorne RN  Outcome: Progressing  7/31/2022 0242 by Perry Hawthorne RN  Outcome: Progressing  Flowsheets (Taken 7/31/2022 0200)  Absence of infection at discharge:   Assess and monitor for signs and symptoms of infection   Monitor lab/diagnostic results   Monitor all insertion sites i.e., indwelling lines, tubes and drains   Monitor endotracheal (as able) and nasal secretions for changes in amount and color  Goal: Absence of infection during hospitalization  7/31/2022 0247 by Perry Hawthorne RN  Outcome: Progressing  7/31/2022 0242 by Perry Hawthorne RN  Outcome: Progressing  Flowsheets (Taken 7/31/2022 0200)  Absence of infection during hospitalization:   Assess and monitor for signs and symptoms of infection   Monitor lab/diagnostic results   Monitor all insertion sites i.e., indwelling lines, tubes and drains   Monitor endotracheal (as able) and nasal secretions for changes in amount and color  Goal: Absence of fever/infection during anticipated neutropenic period  7/31/2022 0247 by Perry Hawthorne RN  Outcome: Progressing  7/31/2022 0242 by Perry Hawthorne RN  Outcome: Progressing     Problem: Metabolic/Fluid and Electrolytes - Adult  Goal: Electrolytes maintained within normal limits  7/31/2022 0247 by Perry Hawthorne RN  Outcome: Progressing  7/31/2022 0242 by Perry Hawthorne RN  Outcome: Progressing  Flowsheets (Taken 7/31/2022 0200)  Electrolytes maintained within normal limits:   Monitor labs and assess patient for signs and symptoms of electrolyte imbalances   Administer electrolyte replacement as ordered  Goal: Hemodynamic stability and optimal renal function maintained  7/31/2022 0247 by Jose Eaton RN  Outcome: Progressing  7/31/2022 0242 by Jose Eaton RN  Outcome: Progressing  Flowsheets (Taken 7/31/2022 0200)  Hemodynamic stability and optimal renal function maintained:   Monitor labs and assess for signs and symptoms of volume excess or deficit   Monitor intake, output and patient weight  Goal: Glucose maintained within prescribed range  7/31/2022 0247 by Jose Eaton RN  Outcome: Progressing  7/31/2022 0242 by Jose Eaton RN  Outcome: Progressing  Flowsheets (Taken 7/31/2022 0200)  Glucose maintained within prescribed range: Monitor blood glucose as ordered     Problem: Hematologic - Adult  Goal: Maintains hematologic stability  7/31/2022 0247 by Jose Eaton RN  Outcome: Progressing  7/31/2022 0242 by Jose Eaton RN  Outcome: Progressing  Flowsheets (Taken 7/31/2022 0200)  Maintains hematologic stability:   Assess for signs and symptoms of bleeding or hemorrhage   Monitor labs for bleeding or clotting disorders     Problem: ABCDS Injury Assessment  Goal: Absence of physical injury  Outcome: Progressing  Flowsheets (Taken 7/31/2022 0000)  Absence of Physical Injury: Implement safety measures based on patient assessment     Problem: Safety - Adult  Goal: Free from fall injury  Outcome: Progressing  Flowsheets (Taken 7/31/2022 0000)  Free From Fall Injury:   Instruct family/caregiver on patient safety   Based on caregiver fall risk screen, instruct family/caregiver to ask for assistance with transferring infant if caregiver noted to have fall risk factors     Problem: Discharge Planning  Goal: Discharge to home or other facility with appropriate resources  7/31/2022 0247 by Jose Eaton RN  Outcome: Not Progressing  7/31/2022 0242 by Jose Eaton RN  Outcome: Not Progressing  Flowsheets (Taken 7/31/2022 0200)  Discharge to home or other facility with appropriate resources: Identify barriers to discharge with patient and caregiver     Problem: Musculoskeletal - Adult  Goal: Return mobility to safest level of function  7/31/2022 0247 by Noam Scott RN  Outcome: Not Progressing  7/31/2022 0242 by Noam Scott RN  Outcome: Not Progressing  Goal: Return ADL status to a safe level of function  7/31/2022 0247 by Noam Scott RN  Outcome: Not Progressing  7/31/2022 0242 by Noam Scott RN  Outcome: Not Progressing     Problem: Gastrointestinal - Adult  Goal: Maintains adequate nutritional intake  7/31/2022 0247 by Noam Scott RN  Outcome: Progressing  7/31/2022 0242 by Noam Scott RN  Outcome: Not Progressing

## 2022-07-31 NOTE — PLAN OF CARE
Problem: Discharge Planning  Goal: Discharge to home or other facility with appropriate resources  Outcome: Not Progressing     Problem: Musculoskeletal - Adult  Goal: Return mobility to safest level of function  Outcome: Not Progressing  Goal: Return ADL status to a safe level of function  Outcome: Not Progressing     Problem: Gastrointestinal - Adult  Goal: Maintains adequate nutritional intake  Outcome: Not Progressing     Problem: Safety - Medical Restraint  Goal: Remains free of injury from restraints (Restraint for Interference with Medical Device)  Description: INTERVENTIONS:  1. Determine that other, less restrictive measures have been tried or would not be effective before applying the restraint  2. Evaluate the patient's condition at the time of restraint application  3. Inform patient/family regarding the reason for restraint  4.  Q2H: Monitor safety, psychosocial status, comfort, nutrition and hydration  Outcome: Progressing  Flowsheets (Taken 7/30/2022 2200)  Remains free of injury from restraints (restraint for interference with medical device):   Determine that other, less restrictive measures have been tried or would not be effective before applying the restraint   Evaluate the patient's condition at the time of restraint application   Inform patient/family regarding the reason for restraint   Every 2 hours: Monitor safety, psychosocial status, comfort, nutrition and hydration     Problem: Pain  Goal: Verbalizes/displays adequate comfort level or baseline comfort level  Outcome: Progressing     Problem: Neurosensory - Adult  Goal: Achieves stable or improved neurological status  Outcome: Progressing  Goal: Absence of seizures  Outcome: Progressing     Problem: Respiratory - Adult  Goal: Achieves optimal ventilation and oxygenation  Outcome: Progressing     Problem: Cardiovascular - Adult  Goal: Maintains optimal cardiac output and hemodynamic stability  Outcome: Progressing  Goal: Absence of cardiac dysrhythmias or at baseline  Outcome: Progressing     Problem: Skin/Tissue Integrity - Adult  Goal: Skin integrity remains intact  Outcome: Progressing  Goal: Oral mucous membranes remain intact  Outcome: Progressing     Problem: Gastrointestinal - Adult  Goal: Minimal or absence of nausea and vomiting  Outcome: Progressing  Goal: Maintains or returns to baseline bowel function  Outcome: Progressing     Problem: Genitourinary - Adult  Goal: Urinary catheter remains patent  Outcome: Progressing     Problem: Infection - Adult  Goal: Absence of infection at discharge  Outcome: Progressing  Goal: Absence of infection during hospitalization  Outcome: Progressing  Goal: Absence of fever/infection during anticipated neutropenic period  Outcome: Progressing     Problem: Metabolic/Fluid and Electrolytes - Adult  Goal: Electrolytes maintained within normal limits  Outcome: Progressing  Goal: Hemodynamic stability and optimal renal function maintained  Outcome: Progressing  Goal: Glucose maintained within prescribed range  Outcome: Progressing     Problem: Hematologic - Adult  Goal: Maintains hematologic stability  Outcome: Progressing     Problem: Discharge Planning  Goal: Discharge to home or other facility with appropriate resources  Outcome: Not Progressing     Problem: Musculoskeletal - Adult  Goal: Return mobility to safest level of function  Outcome: Not Progressing  Goal: Return ADL status to a safe level of function  Outcome: Not Progressing     Problem: Gastrointestinal - Adult  Goal: Maintains adequate nutritional intake  Outcome: Not Progressing

## 2022-07-31 NOTE — PROGRESS NOTES
Hospitalist Progress Note      Synopsis: Patient admitted on 7/30/2022 with chest pain and shortness of breath. Patient became unresponsive and developed acute hypoxic respiratory failure. He was placed on an non re-breather and ultimately intubated for airway protection. CTA of the chest showed no PE but possible bilateral pneumonia vs atelectasis worse on the right. Patient started on Zosyn for aspiration Pneumonia. He did test positive for COVID. Started on decadron, zinc, and vitamin C. Given concern for possible seizure. Patient loaded with keppra and neurology consulted. CTAP showed possible pyelonephritis and enteritis/ileus. General surgery consulted and patient is on zosyn. ASSESSMENT AND PLAN     Acute Hypoxic Respiratory Failure  -Patient is currently intubated and sedated  -Continue ventilator support  -Likely multifactorial with COVID, Aspiration Pneumonia, and ?seizure    Aspiration Pneumonia  Patient being covered with Zosyn    COVID 19  -Patient is on decadron, vitamin C, and Zinc     Seizure  -Patient loaded with keppra   -Neurology consulted  -MRI/EEG pending     Enteritis/Ileus  General surgery consulted    ? Pyelo  Patient on zosyn    Management as per ICU team.       Subjective        Exam:  /73   Pulse 81   Temp 100 °F (37.8 °C) (Bladder)   Resp 15   Ht 6' 2\" (1.88 m)   Wt 150 lb 2.1 oz (68.1 kg)   SpO2 100%   BMI 19.28 kg/m²     **Patient with COVID on ventilator--Management as per ICU and consultants** Observed from window. General appearance: No apparent distress, appears stated age   Respiratory:  Normal respiratory effort. Ventilated   Cardiovascular: Regular rate   Musculoskeletal:Intubated and sedated   Skin:  No rashes    Neurologic: intubated, sedated.      Medications:  Reviewed    Infusion Medications    midazolam 2 mg/hr (07/31/22 0600)    propofol Stopped (07/31/22 0540)    sodium chloride 75 mL/hr at 07/31/22 0600    fentaNYL 10 mcg/ml in 0.9%  ml infusion 125 mcg/hr (07/31/22 0600)    sodium chloride Stopped (07/31/22 0326)     Scheduled Medications    levETIRAcetam  500 mg IntraVENous Q12H    budesonide  0.5 mg Nebulization BID    Arformoterol Tartrate  15 mcg Nebulization BID    sodium chloride flush  10 mL IntraVENous 2 times per day    enoxaparin  40 mg SubCUTAneous Daily    piperacillin-tazobactam  4,500 mg IntraVENous Q8H    sodium chloride flush  5-40 mL IntraVENous 2 times per day    pantoprazole (PROTONIX) 40 mg injection  40 mg IntraVENous Daily    dexamethasone  20 mg IntraVENous Q24H    Followed by    Jailene Salamanca ON 8/5/2022] dexamethasone  10 mg IntraVENous Q24H    vitamin D  2,000 Units Oral Daily    ascorbic acid  500 mg Oral Daily    zinc sulfate  50 mg Oral Daily     PRN Meds: midazolam, fentanNYL, sodium chloride, promethazine **OR** ondansetron, polyethylene glycol, sodium chloride flush, [DISCONTINUED] acetaminophen **OR** acetaminophen    I/O    Intake/Output Summary (Last 24 hours) at 7/31/2022 0839  Last data filed at 7/31/2022 0600  Gross per 24 hour   Intake 2049.26 ml   Output 795 ml   Net 1254.26 ml       Labs:   Recent Labs     07/30/22 1725 07/30/22 2230 07/31/22  0527   WBC 6.6 8.0 9.0   HGB 15.3 13.8 13.0   HCT 45.8 41.6 39.7    169 157       Recent Labs     07/30/22 1725 07/30/22 2230 07/31/22  0527    136 138   K 4.8 4.2 4.4    104 108*   CO2 23 23 21*   BUN 10 9 11   CREATININE 1.0 0.8 0.9   CALCIUM 9.7 7.9* 8.1*   PHOS  --  2.7  --        Recent Labs     07/30/22 1725 07/30/22 2230 07/31/22  0527   PROT 7.8 6.5 6.5   ALKPHOS 111 104 97   ALT 36 39 34   AST 29 34 22   BILITOT 0.3 0.3 0.3       Recent Labs     07/30/22 1725 07/31/22  0527   INR 1.3 1.4       No results for input(s): CKTOTAL, TROPONINI in the last 72 hours.     Chronic labs:  Lab Results   Component Value Date    INR 1.4 07/31/2022       Radiology:  Imaging studies reviewed today.    +++++++++++++++++++++++++++++++++++++++++++++++++  Benito Fan [facility].  +++++++++++++++++++++++++++++++++++++++++++++++++  NOTE: This report was transcribed using voice recognition software. Every effort was made to ensure accuracy; however, inadvertent computerized transcription errors may be present.

## 2022-07-31 NOTE — PLAN OF CARE
Problem: Discharge Planning  Goal: Discharge to home or other facility with appropriate resources  7/31/2022 0904 by Haley العلي RN  Outcome: Progressing  Flowsheets (Taken 7/31/2022 0845)  Discharge to home or other facility with appropriate resources: Identify barriers to discharge with patient and caregiver  7/31/2022 0247 by Meliza Myrick RN  Outcome: Not Progressing  7/31/2022 0242 by Meliza Myrick RN  Outcome: Not Progressing  Flowsheets (Taken 7/31/2022 0200)  Discharge to home or other facility with appropriate resources: Identify barriers to discharge with patient and caregiver     Problem: Safety - Medical Restraint  Goal: Remains free of injury from restraints (Restraint for Interference with Medical Device)  Description: INTERVENTIONS:  1. Determine that other, less restrictive measures have been tried or would not be effective before applying the restraint  2. Evaluate the patient's condition at the time of restraint application  3. Inform patient/family regarding the reason for restraint  4.  Q2H: Monitor safety, psychosocial status, comfort, nutrition and hydration  7/31/2022 0904 by Haley العلي RN  Outcome: Progressing  Flowsheets (Taken 7/31/2022 0800)  Remains free of injury from restraints (restraint for interference with medical device): Determine that other, less restrictive measures have been tried or would not be effective before applying the restraint  7/31/2022 0247 by Meliza Myrick RN  Outcome: Progressing  7/31/2022 0242 by Meliza Myrick RN  Outcome: Progressing  Flowsheets (Taken 7/30/2022 2200)  Remains free of injury from restraints (restraint for interference with medical device):   Determine that other, less restrictive measures have been tried or would not be effective before applying the restraint   Evaluate the patient's condition at the time of restraint application   Inform patient/family regarding the reason for restraint   Every 2 hours: Monitor safety, psychosocial status, comfort, nutrition and hydration     Problem: Pain  Goal: Verbalizes/displays adequate comfort level or baseline comfort level  7/31/2022 0904 by Kaylee Benedict RN  Outcome: Progressing  7/31/2022 0247 by Sabiha Sharma RN  Outcome: Progressing  7/31/2022 0242 by Sabiha Sharma RN  Outcome: Progressing     Problem: Neurosensory - Adult  Goal: Achieves stable or improved neurological status  7/31/2022 0904 by Kaylee Benedict RN  Outcome: Progressing  Flowsheets (Taken 7/31/2022 0845)  Achieves stable or improved neurological status: Assess for and report changes in neurological status  7/31/2022 0247 by Sabiha Sharma RN  Outcome: Progressing  7/31/2022 0242 by Sabiha Sharma RN  Outcome: Progressing  Flowsheets (Taken 7/31/2022 0200)  Achieves stable or improved neurological status:   Assess for and report changes in neurological status   Initiate measures to prevent increased intracranial pressure   Maintain blood pressure and fluid volume within ordered parameters to optimize cerebral perfusion and minimize risk of hemorrhage   Monitor temperature, glucose, and sodium. Initiate appropriate interventions as ordered  Goal: Absence of seizures  7/31/2022 0904 by Kaylee Benedict RN  Outcome: Progressing  Flowsheets (Taken 7/31/2022 0845)  Absence of seizures: Monitor for seizure activity. If seizure occurs, document type and location of movements and any associated apnea  7/31/2022 0247 by Sabiha Sharma RN  Outcome: Progressing  7/31/2022 0242 by Sabiha Sharma RN  Outcome: Progressing  Flowsheets (Taken 7/31/2022 0200)  Absence of seizures: Monitor for seizure activity.   If seizure occurs, document type and location of movements and any associated apnea  Goal: Remains free of injury related to seizures activity  Outcome: Progressing  Flowsheets  Taken 7/31/2022 0845 by Kaylee Benedict RN  Remains free of injury related to seizure activity: Maintain airway, patient safety  and administer oxygen as ordered  Taken 7/31/2022 0200 by Jatinder Lui RN  Remains free of injury related to seizure activity: Maintain airway, patient safety  and administer oxygen as ordered     Problem: Respiratory - Adult  Goal: Achieves optimal ventilation and oxygenation  7/31/2022 0904 by Ángela Maharaj RN  Outcome: Progressing  7/31/2022 [address] by Jatinder Lui RN  Outcome: Progressing  7/31/2022 0242 by Jatinder Lui RN  Outcome: Progressing  Flowsheets (Taken 7/31/2022 0200)  Achieves optimal ventilation and oxygenation:   Assess for changes in respiratory status   Assess for changes in mentation and behavior   Position to facilitate oxygenation and minimize respiratory effort   Oxygen supplementation based on oxygen saturation or arterial blood gases   Assess the need for suctioning and aspirate as needed   Respiratory therapy support as indicated     Problem: Cardiovascular - Adult  Goal: Maintains optimal cardiac output and hemodynamic stability  7/31/2022 0904 by Ángela Maharaj RN  Outcome: Progressing  7/31/2022 0247 by Jatinder Lui RN  Outcome: Progressing  7/31/2022 0242 by Jatinder Lui RN  Outcome: Progressing  Flowsheets (Taken 7/31/2022 0200)  Maintains optimal cardiac output and hemodynamic stability:   Monitor blood pressure and heart rate   Monitor urine output and notify Licensed Independent Practitioner for values outside of normal range  Goal: Absence of cardiac dysrhythmias or at baseline  7/31/2022 0904 by Ángela Maharaj RN  Outcome: Progressing  7/31/2022 [address] by Jatinder Lui RN  Outcome: Progressing  7/31/2022 0242 by Jatinder Lui RN  Outcome: Progressing  Flowsheets (Taken 7/31/2022 0200)  Absence of cardiac dysrhythmias or at baseline:   Monitor cardiac rate and rhythm   Assess for signs of decreased cardiac output     Problem: Skin/Tissue Integrity - Adult  Goal: Skin integrity remains intact  7/31/2022 0904 by Ángela Maharaj RN  Outcome: Progressing  Flowsheets  Taken 7/31/2022 7363  Skin Integrity Remains Intact: Monitor for areas of redness and/or skin breakdown  Taken 7/31/2022 0845  Skin Integrity Remains Intact: Monitor for areas of redness and/or skin breakdown  7/31/2022 0247 by Jacqueline Gonzalez RN  Outcome: Progressing  7/31/2022 0242 by Jacqueline Gonzalez RN  Outcome: Progressing  Flowsheets  Taken 7/31/2022 0200  Skin Integrity Remains Intact:   Monitor for areas of redness and/or skin breakdown   Assess vascular access sites hourly  Taken 7/31/2022 0000  Skin Integrity Remains Intact:   Monitor for areas of redness and/or skin breakdown   Assess vascular access sites hourly  Goal: Incisions, wounds, or drain sites healing without S/S of infection  Outcome: Progressing  Flowsheets  Taken 7/31/2022 0858  Incisions, Wounds, or Drain Sites Healing Without Sign and Symptoms of Infection: TWICE DAILY: Assess and document skin integrity  Taken 7/31/2022 0845  Incisions, Wounds, or Drain Sites Healing Without Sign and Symptoms of Infection: TWICE DAILY: Assess and document skin integrity  Goal: Oral mucous membranes remain intact  7/31/2022 0904 by Daniel López RN  Outcome: Progressing  Flowsheets  Taken 7/31/2022 0858  Oral Mucous Membranes Remain Intact: Assess oral mucosa and hygiene practices  Taken 7/31/2022 0845  Oral Mucous Membranes Remain Intact: Assess oral mucosa and hygiene practices  7/31/2022 0247 by Jacqueline Gonzalez RN  Outcome: Progressing  7/31/2022 0242 by Jacqueline Gonzalez RN  Outcome: Progressing     Problem: Musculoskeletal - Adult  Goal: Return mobility to safest level of function  7/31/2022 0904 by Daniel López RN  Outcome: Progressing  Flowsheets (Taken 7/31/2022 0845)  Return Mobility to Safest Level of Function: Assess patient stability and activity tolerance for standing, transferring and ambulating with or without assistive devices  7/31/2022 0247 by Jacqueline Gonzalez RN  Outcome: Not Progressing  7/31/2022 0242 by Jacqueline Gonzalez RN  Outcome: Not Progressing  Goal: maintain optimal ostomy function  Outcome: Progressing  Flowsheets (Taken 7/31/2022 0845)  Establish and maintain optimal ostomy function: Administer IV fluids and TPN as ordered     Problem: Genitourinary - Adult  Goal: Absence of urinary retention  Outcome: Progressing  Goal: Urinary catheter remains patent  7/31/2022 0904 by Kaylee Benedict RN  Outcome: Progressing  7/31/2022 0247 by Sabiha Sharma RN  Outcome: Progressing  7/31/2022 0242 by Sabiha Sharma RN  Outcome: Progressing  Flowsheets (Taken 7/31/2022 0200)  Urinary catheter remains patent: Assess patency of urinary catheter     Problem: Infection - Adult  Goal: Absence of infection at discharge  7/31/2022 0904 by Kaylee Benedict RN  Outcome: Progressing  Flowsheets (Taken 7/31/2022 0845)  Absence of infection at discharge: Assess and monitor for signs and symptoms of infection  7/31/2022 0247 by Sabiha Sharma RN  Outcome: Progressing  7/31/2022 0242 by Sabiha Sharma RN  Outcome: Progressing  Flowsheets (Taken 7/31/2022 0200)  Absence of infection at discharge:   Assess and monitor for signs and symptoms of infection   Monitor lab/diagnostic results   Monitor all insertion sites i.e., indwelling lines, tubes and drains   Monitor endotracheal (as able) and nasal secretions for changes in amount and color  Goal: Absence of infection during hospitalization  7/31/2022 0904 by Kaylee Benedict RN  Outcome: Progressing  Flowsheets (Taken 7/31/2022 0845)  Absence of infection during hospitalization: Assess and monitor for signs and symptoms of infection  7/31/2022 0247 by Sabiha Sharma RN  Outcome: Progressing  7/31/2022 0242 by Sabiha Sharma RN  Outcome: Progressing  Flowsheets (Taken 7/31/2022 0200)  Absence of infection during hospitalization:   Assess and monitor for signs and symptoms of infection   Monitor lab/diagnostic results   Monitor all insertion sites i.e., indwelling lines, tubes and drains   Monitor endotracheal (as able) and nasal secretions for changes in amount and color  Goal: Absence of fever/infection during anticipated neutropenic period  7/31/2022 0904 by Cailin Aguayo RN  Outcome: Progressing  7/31/2022 0247 by Henry Tiwari RN  Outcome: Progressing  7/31/2022 0242 by Henry Tiwari RN  Outcome: Progressing     Problem: Metabolic/Fluid and Electrolytes - Adult  Goal: Electrolytes maintained within normal limits  7/31/2022 0904 by Cailin Aguayo RN  Outcome: Progressing  Flowsheets (Taken 7/31/2022 0845)  Electrolytes maintained within normal limits: Monitor labs and assess patient for signs and symptoms of electrolyte imbalances  7/31/2022 0247 by Henry Tiwari RN  Outcome: Progressing  7/31/2022 0242 by Henry Tiwari RN  Outcome: Progressing  Flowsheets (Taken 7/31/2022 0200)  Electrolytes maintained within normal limits:   Monitor labs and assess patient for signs and symptoms of electrolyte imbalances   Administer electrolyte replacement as ordered  Goal: Hemodynamic stability and optimal renal function maintained  7/31/2022 0904 by Cailin Aguayo RN  Outcome: Progressing  Flowsheets (Taken 7/31/2022 0845)  Hemodynamic stability and optimal renal function maintained: Monitor labs and assess for signs and symptoms of volume excess or deficit  7/31/2022 0247 by Henry Tiwari RN  Outcome: Progressing  7/31/2022 0242 by Henry Tiwari RN  Outcome: Progressing  Flowsheets (Taken 7/31/2022 0200)  Hemodynamic stability and optimal renal function maintained:   Monitor labs and assess for signs and symptoms of volume excess or deficit   Monitor intake, output and patient weight  Goal: Glucose maintained within prescribed range  7/31/2022 0904 by Cailin Aguayo RN  Outcome: Progressing  Flowsheets (Taken 7/31/2022 0845)  Glucose maintained within prescribed range: Monitor blood glucose as ordered  7/31/2022 0247 by Henry Tiwari RN  Outcome: Progressing  7/31/2022 0242 by Henry Tiwari RN  Outcome: Progressing  Flowsheets respiratory therapy assess nares and determine need for appliance change or resting period.   Outcome: Progressing     Problem: Discharge Planning  Goal: Discharge to home or other facility with appropriate resources  7/31/2022 0904 by Ella Jackson RN  Outcome: Progressing  Flowsheets (Taken 7/31/2022 0845)  Discharge to home or other facility with appropriate resources: Identify barriers to discharge with patient and caregiver  7/31/2022 0247 by Rafia Jonas RN  Outcome: Not Progressing  7/31/2022 0242 by Rafia Jonas RN  Outcome: Not Progressing  Flowsheets (Taken 7/31/2022 0200)  Discharge to home or other facility with appropriate resources: Identify barriers to discharge with patient and caregiver     Problem: Musculoskeletal - Adult  Goal: Return mobility to safest level of function  7/31/2022 0904 by Ella Jackson RN  Outcome: Progressing  Flowsheets (Taken 7/31/2022 0845)  Return Mobility to Safest Level of Function: Assess patient stability and activity tolerance for standing, transferring and ambulating with or without assistive devices  7/31/2022 0247 by Rafia Jonas RN  Outcome: Not Progressing  7/31/2022 0242 by Rafia Jonas RN  Outcome: Not Progressing  Goal: Return ADL status to a safe level of function  7/31/2022 0904 by Ella Jackson RN  Outcome: Progressing  Flowsheets (Taken 7/31/2022 0845)  Return ADL Status to a Safe Level of Function: Administer medication as ordered  7/31/2022 0247 by Rafia Jonas RN  Outcome: Not Progressing  7/31/2022 0242 by Rafia Jonas RN  Outcome: Not Progressing     Problem: Gastrointestinal - Adult  Goal: Maintains adequate nutritional intake  7/31/2022 0904 by Ella Jackson RN  Outcome: Progressing  Flowsheets (Taken 7/31/2022 0845)  Maintains adequate nutritional intake: Monitor intake and output, weight and lab values  7/31/2022 0247 by Rafia Jonas RN  Outcome: Progressing  7/31/2022 0242 by Rafia Jonas RN  Outcome: Not Progressing

## 2022-07-31 NOTE — CONSULTS
[address] [facility]  Department of Critical Care Medicine   MICU Consult Note    Patient:  Nabeel Arteaga /Age/Sex: 1975  (49 y.o. male)   MRN & CSN:  [id number] & [id number] Admission Date/Time: 2022  4:51 PM   Allergy: Patient has no known allergies. Date of Service: 2022       Hospital Day: 1    Supervising/Attending Physician: Dr. Deshaun Ricketts       Chief Complaint: Shortness of Breath and Chest Pain (Per family member, complaints of SOB and CP)    History of Present Illness:   Principal Problem: Acute respiratory failure ([facility] [address]  Nabeel Arteaga is a 49 y.o. male who has past medical history of smoking, marijuana abuse, and bimalleolar fracture of left ankle was presented to the ED for complaints of chest pain and shortness of breath. Per ED notes, patient's family member reports that patient called her today said that he had chest pain and shortness of breath. Patient arrived to the ED by private vehicle with saturating at 80%, placed on a non-rebreather. Then, patient unable to participate, became minimally responsive, not protecting his airway with severe respiratory distress, and altered mental status. Concerned that patient may have had a seizure. Patient got intubated, sedated, placed on mechanical ventilator, and received 2 g of Keppra as a loading dose. CT head negative for any acute process, CTA was negative for any acute process, and there are pleural effusions in the right lower lobe on CT. Patient's electrolytes, CBC are all within normal limits, serum drug screen did test positive for benzodiazepines and cannabinoid. Patient was given Zosyn for concern for aspiration pneumonia in the right lower lobe. COVID test in ED is positive. Patient got transferred and admitted to the MICU for close hemodynamic monitoring and respiratory failure on mechanical ventilation management. On MICU arrival, patient unresponsive to verbal stimuli but responsive to noxious stimuli.  NS, Fentanyl and propofol continuously infusing. No labored breathing noted on arrival. Vital sign within normal limit. No family available at bedside. ROS: Unable to review ten point ROS due to current neurological condition, unless as noted in A/P. Past Medical History:     Past Medical History:   Diagnosis Date    Bimalleolar fracture of left ankle 9/4/2014     Past Surgery History:  Patient  has a past surgical history that includes Ankle fracture surgery (Left, 2011) and fracture surgery (9-18-14). Social History:    FAM HX: Assessed: family history is not on file. Soc HX:   Social History     Socioeconomic History    Marital status: Single   Tobacco Use    Smoking status: Some Days     Packs/day: 0.50     Years: 5.00     Pack years: 2.50     Types: Cigars, Cigarettes    Smokeless tobacco: Never   Substance and Sexual Activity    Alcohol use: No    Drug use: No     TOBACCO:   reports that he has been smoking cigars. He has a 2.50 pack-year smoking history. He has never used smokeless tobacco.  ETOH:   reports no history of alcohol use. Drugs:  reports no history of drug use. Present Illness:tory of Present Illness:  Subjective:   Vicenta Zuniga is a 49 y.o. male who has past medical history of smoking, marijuana abuse, and bimalleolar fracture of left ankle was presented to the ED for complaints of chest pain and shortness of breath. Patient got intubated, sedated, placed on mechanical ventilator, and received 2 g of Keppra as a loading dose. Patient was given Zosyn for concern for aspiration pneumonia in the right lower lobe. COVID test in ED is positive. Patient got transferred and admitted to the MICU for close hemodynamic monitoring and respiratory failure on mechanical ventilation management. Objective:        Intake/Output Summary (Last 24 hours) at 7/30/2022 2156  Last data filed at 7/30/2022 2150  Gross per 24 hour   Intake 573.76 ml   Output 350 ml   Net 223.76 ml       Vital Signs: Vitals:    07/30/22 2015 07/30/22 2053 07/30/22 2111 07/30/22 2133   BP: 114/70  137/82 (!) 150/100   Pulse: 88  90 87   Resp: 16 23 22   Temp:   100 °F (37.8 °C)    TempSrc:   Bladder    SpO2: 99% 99% 100% 100%   Weight:       Height:          BP (!) 150/100   Pulse 87   Temp 100 °F (37.8 °C) (Bladder)   Resp 22   Ht 6' 2\" (1.88 m)   Wt 175 lb (79.4 kg)   SpO2 100%   BMI 22.47 kg/m²       I & O - 24hr:   Intake/Output Summary (Last 24 hours) at 7/30/2022 2156  Last data filed at 7/30/2022 2150  Gross per 24 hour   Intake 573.76 ml   Output 350 ml   Net 223.76 ml       Physical Exam: 07/30/22  GENERAL APPEARANCE:  sedated male, laying in bed in no apparent distress. EYES:   Pupils are equally round. No scleral erythema, discharge, or conjunctivitis. HENT:   Mucous membranes are moist. +ETT +OGT  NECK:   No apparent thyromegaly or masses. RESP:   LS diminished air exchange bilateral with scattered  rhonchi, no wheezes, no rales. Symmetric chest movement. No labored breathing while intubated on mechanical ventilation. CARDIO/VASC:   S1/S2 auscultated. Regular rate without appreciable murmurs, rubs, or gallops. Peripheral pulses equal bilaterally and palpable. No pitting edema in extremities   GI:   Abdomen is soft without significant tenderness, masses, or guarding. Bowel sounds are normoactive. Rectal exam deferred. :   Hampton catheter is present. MSK:   No gross joint deformities. No spontaneous movement noted  SKIN:   Normal coloration, warm, dry. NEURO:   Does not follow directions. No response to verbal stimuli. Minimally responsive to noxious stimuli.   PSYCH:   Sedated on propofol and fentanyl    Lines:     site day   Peripheral IV R Arm  R Wrist  L [facility] [place], operated by [facility] 07/30/22 07/30/22 07/30/22     Mechanical Ventilation:  Vent Mode: AC/VC  Vt Ordered: 400  Rate Set: 14 bmp  FiO2 : 60 %  SpO2: 99.5%  PaO2/FiO2 ratio: 2.56  PEEP:  5    ABG:     Lab Results   Component Value Date/Time    PH 7.526 07/30/2022 mL  10 mL IntraVENous 2 times per day Teresa Grooms, DO        0.9 % sodium chloride infusion   IntraVENous PRN Teresa Grooms, DO 10 mL/hr at 07/30/22 2155 New Bag at 07/30/22 2155    [START ON 7/31/2022] enoxaparin (LOVENOX) injection 40 mg  40 mg SubCUTAneous Daily Teresa Grooms, DO        promethazine (PHENERGAN) tablet 12.5 mg  12.5 mg Oral Q6H PRN Teresa Grooms, DO        Or    ondansetron [facility]) injection 4 mg  4 mg IntraVENous Q6H PRN Teresa Grooms, DO        polyethylene glycol (GLYCOLAX) packet 17 g  17 g Oral Daily PRN Teresa Grooms, DO        [START ON 7/31/2022] piperacillin-tazobactam (ZOSYN) 4,500 mg in dextrose 5 % 100 mL IVPB ([id number])  4,500 mg IntraVENous Q8H Teresa Grooms, DO        propofol injection  5-50 mcg/kg/min IntraVENous Continuous Estle Legato, DO 18.7 mL/hr at 07/30/22 2150 45 mcg/kg/min at 07/30/22 2150    sodium chloride flush 0.9 % injection 5-40 mL  5-40 mL IntraVENous 2 times per day Ethelda Grater, APRN - CNP        sodium chloride flush 0.9 % injection 5-40 mL  5-40 mL IntraVENous PRN Ethelda Grater, APRN - CNP        acetaminophen (TYLENOL) suppository 650 mg  650 mg Rectal Q6H PRN Ethelda Grater, APRN - CNP        [START ON 7/31/2022] pantoprazole (PROTONIX) 40 mg in sodium chloride (PF) 10 mL injection  40 mg IntraVENous Daily Lexy Donahue, APRN - CNP        [START ON 7/31/2022] dexamethasone (DECADRON) 20 mg in sodium chloride 0.9 % 50 mL IVPB  20 mg IntraVENous Q24H Lexy Godfrey, APRN - CNP        Followed by    Albaro Bajwa ON 8/5/2022] dexamethasone (DECADRON) injection 10 mg  10 mg IntraVENous Q24H Lexy Donahue, APRN - CNP        [START ON 7/31/2022] vitamin D (CHOLECALCIFEROL) tablet 2,000 Units  2,000 Units Oral Daily Lexy Donahue, APRN - CNP        [START ON 7/31/2022] ascorbic acid (VITAMIN C) tablet 500 mg  500 mg Oral Daily SEVERINO Fernandez CNP        [START ON 7/31/2022] zinc sulfate (ZINCATE) capsule 50 mg  50 mg Oral Daily SEVERINO Ramirez - CNP        propofol 1000 MG/100ML injection               Prior to Admission Meds:  Prior to Admission medications    Not on File     Infusions:  IVF:   NaCl 0.9%: rate 75 ml/h     Sedation  Propofol and Fentanyl    Nutrition:  NPO    ATB:   Antibiotics  Start Date   Zosyn 07/30/22     Skin issues: none  Patient currently has   Urinary cath  Isolation  Restraints    Labs:     Recent Labs     07/30/22  1725   WBC 6.6   HGB 15.3   HCT 45.8        Recent Labs     07/30/22  1725      K 4.8      CO2 23   BUN 10   CREATININE 1.0     Recent Labs     07/30/22  1725   AST 29   ALT 36   BILITOT 0.3   ALKPHOS 111     Recent Labs     07/30/22  1725   INR 1.3     No results for input(s): CKTOTAL, CKMB, CKMBINDEX in the last 72 hours. Invalid input(s): Liseth Corbett input(s): PRO-BNP    Radiology/Imaging Studies:  CT HEAD WO CONTRAST    Result Date: 7/30/2022  EXAMINATION: CT OF THE HEAD WITHOUT CONTRAST; CTA OF THE CHEST; CT OF THE ABDOMEN AND PELVIS WITH CONTRAST  7/30/2022 5:49 pm TECHNIQUE: CT of the head was performed without the administration of intravenous contrast. Automated exposure control, iterative reconstruction, and/or weight based adjustment of the mA/kV was utilized to reduce the radiation dose to as low as reasonably achievable.; CTA of the chest was performed after the administration of intravenous contrast.  Multiplanar reformatted images are provided for review. MIP images are provided for review. Automated exposure control, iterative reconstruction, and/or weight based adjustment of the mA/kV was utilized to reduce the radiation dose to as low as reasonably achievable.; CT of the abdomen and pelvis was performed with the administration of intravenous contrast. Multiplanar reformatted images are provided for review.  Automated exposure control, iterative reconstruction, and/or weight based adjustment of the mA/kV was utilized to reduce the radiation dose to as low as reasonably achievable. COMPARISON: September 4, 2014 HISTORY: ORDERING SYSTEM PROVIDED HISTORY: ams TECHNOLOGIST PROVIDED HISTORY: Has a \"code stroke\" or \"stroke alert\" been called? ->No Reason for exam:->ams Decision Support Exception - unselect if not a suspected or confirmed emergency medical condition->Emergency Medical Condition ([place]) What reading provider will be dictating this exam?->CRC FINDINGS: CT head. The ventricles are of normal size and configuration. The brain parenchyma has normal architecture and attenuation. There is no acute stroke, mass or hemorrhage. There is soft tissue density with the low-attenuation completely filling the nasopharynx and nasal cavity which may be due to secretions/inflammation. Underlying malignant process has to be excluded. Direct visualization recommended. No acute intracranial abnormality. Soft tissue density completely filling the nasal cavity and nasopharynx probably inflammatory/secretions. Malignancy has to be excluded and direct visualization recommended. CTA chest. Comparison September 4, 2014 Findings The heart and the great vessels are normal.  Nonenlarged mediastinal and hilar lymph nodes are present. Trachea and major bronchi are patent. There are no filling defects in the main pulmonary artery and the central branches. Says mucous plugging is identified in the lower lobe bronchi bilaterally. There is atelectasis/infiltrates in the right lower lobe, right middle lobe and to a lesser extent in the left lower lobe. There is no pleural effusion. Impression There is no central pulmonary embolism or aortic dissection. Mucous plugging in the lower lobes bilaterally with atelectasis/infiltrates in the lower lobes more on the right side and right middle lobe concerning for pneumonia. CT abdomen pelvis. The liver is of normal architecture. Gallbladder is partially distended.  Spleen, pancreas, and the adrenals are normal.  There is heterogeneous enhancement of the kidneys with the areas of decreased attenuation concerning for pyelonephritis. Dilated fluid-filled small bowel loops are noted likely ileus/enteritis with small bowel loops measuring up to 2 cm. Pelvis. Bladder is partially distended with the Hampton catheter and the wall thickening. Colon is normal.  The appendix is partially identified and appears normal. Impression Heterogeneous enhancement of the kidneys concerning for multifocal pyelonephritis. There is also thickening of the urinary bladder. Please correlate with urinalysis. Dilated fluid-filled small bowel loops likely ileus/enteritis. CT ABDOMEN PELVIS W IV CONTRAST Additional Contrast? None    Result Date: 7/30/2022  EXAMINATION: CT OF THE HEAD WITHOUT CONTRAST; CTA OF THE CHEST; CT OF THE ABDOMEN AND PELVIS WITH CONTRAST  7/30/2022 5:49 pm TECHNIQUE: CT of the head was performed without the administration of intravenous contrast. Automated exposure control, iterative reconstruction, and/or weight based adjustment of the mA/kV was utilized to reduce the radiation dose to as low as reasonably achievable.; CTA of the chest was performed after the administration of intravenous contrast.  Multiplanar reformatted images are provided for review. MIP images are provided for review. Automated exposure control, iterative reconstruction, and/or weight based adjustment of the mA/kV was utilized to reduce the radiation dose to as low as reasonably achievable.; CT of the abdomen and pelvis was performed with the administration of intravenous contrast. Multiplanar reformatted images are provided for review. Automated exposure control, iterative reconstruction, and/or weight based adjustment of the mA/kV was utilized to reduce the radiation dose to as low as reasonably achievable. COMPARISON: September 4, 2014 HISTORY: ORDERING SYSTEM PROVIDED HISTORY: [facility] TECHNOLOGIST PROVIDED HISTORY: Has a \"code stroke\" or \"stroke alert\" been called? ->No Reason for exam:->[facility] Decision Support Exception - unselect if not a suspected or confirmed emergency medical condition->Emergency Medical Condition (MA) What reading provider will be dictating this exam?->CRC FINDINGS: CT head. The ventricles are of normal size and configuration. The brain parenchyma has normal architecture and attenuation. There is no acute stroke, mass or hemorrhage. There is soft tissue density with the low-attenuation completely filling the nasopharynx and nasal cavity which may be due to secretions/inflammation. Underlying malignant process has to be excluded. Direct visualization recommended. No acute intracranial abnormality. Soft tissue density completely filling the nasal cavity and nasopharynx probably inflammatory/secretions. Malignancy has to be excluded and direct visualization recommended. CTA chest. Comparison September 4, 2014 Findings The heart and the great vessels are normal.  Nonenlarged mediastinal and hilar lymph nodes are present. Trachea and major bronchi are patent. There are no filling defects in the main pulmonary artery and the central branches. Says mucous plugging is identified in the lower lobe bronchi bilaterally. There is atelectasis/infiltrates in the right lower lobe, right middle lobe and to a lesser extent in the left lower lobe. There is no pleural effusion. Impression There is no central pulmonary embolism or aortic dissection. Mucous plugging in the lower lobes bilaterally with atelectasis/infiltrates in the lower lobes more on the right side and right middle lobe concerning for pneumonia. CT abdomen pelvis. The liver is of normal architecture. Gallbladder is partially distended. Spleen, pancreas, and the adrenals are normal.  There is heterogeneous enhancement of the kidneys with the areas of decreased attenuation concerning for pyelonephritis. Dilated fluid-filled small bowel loops are noted likely ileus/enteritis with small bowel loops measuring up to 2 cm. Pelvis. Bladder is partially distended with the Hampton catheter and the wall thickening. Colon is normal.  The appendix is partially identified and appears normal. Impression Heterogeneous enhancement of the kidneys concerning for multifocal pyelonephritis. There is also thickening of the urinary bladder. Please correlate with urinalysis. Dilated fluid-filled small bowel loops likely ileus/enteritis. XR CHEST PORTABLE    Result Date: 7/30/2022  EXAMINATION: ONE XRAY VIEW OF THE CHEST 7/30/2022 7:43 pm COMPARISON: Previous CT scan on the same day. HISTORY: ORDERING SYSTEM PROVIDED HISTORY: Post tube TECHNOLOGIST PROVIDED HISTORY: Reason for exam:->Post tube What reading provider will be dictating this exam?->CRC FINDINGS: Heart size is normal.  Endotracheal tube is 4.7 cm above the rodolfo. Nasogastric tube is noted. There is atelectasis/infiltrates in the right lower lobe and possibly in the right middle lobe. There may be minimal atelectasis in the left base. Atelectasis/infiltrates in the right lower lobe and right middle lobe concerning for pneumonia. CTA PULMONARY W CONTRAST    Result Date: 7/30/2022  EXAMINATION: CT OF THE HEAD WITHOUT CONTRAST; CTA OF THE CHEST; CT OF THE ABDOMEN AND PELVIS WITH CONTRAST  7/30/2022 5:49 pm TECHNIQUE: CT of the head was performed without the administration of intravenous contrast. Automated exposure control, iterative reconstruction, and/or weight based adjustment of the mA/kV was utilized to reduce the radiation dose to as low as reasonably achievable.; CTA of the chest was performed after the administration of intravenous contrast.  Multiplanar reformatted images are provided for review. MIP images are provided for review.  Automated exposure control, iterative reconstruction, and/or weight based adjustment of the mA/kV was utilized to reduce the radiation dose to as low as reasonably achievable.; CT of the abdomen and pelvis was performed with the administration of intravenous contrast. Multiplanar reformatted images are provided for review. Automated exposure control, iterative reconstruction, and/or weight based adjustment of the mA/kV was utilized to reduce the radiation dose to as low as reasonably achievable. COMPARISON: September 4, 2014 HISTORY: ORDERING SYSTEM PROVIDED HISTORY: ams TECHNOLOGIST PROVIDED HISTORY: Has a \"code stroke\" or \"stroke alert\" been called? ->No Reason for exam:->ams Decision Support Exception - unselect if not a suspected or confirmed emergency medical condition->Emergency Medical Condition (MA) What reading provider will be dictating this exam?->CRC FINDINGS: CT head. The ventricles are of normal size and configuration. The brain parenchyma has normal architecture and attenuation. There is no acute stroke, mass or hemorrhage. There is soft tissue density with the low-attenuation completely filling the nasopharynx and nasal cavity which may be due to secretions/inflammation. Underlying malignant process has to be excluded. Direct visualization recommended. No acute intracranial abnormality. Soft tissue density completely filling the nasal cavity and nasopharynx probably inflammatory/secretions. Malignancy has to be excluded and direct visualization recommended. CTA chest. Comparison September 4, 2014 Findings The heart and the great vessels are normal.  Nonenlarged mediastinal and hilar lymph nodes are present. Trachea and major bronchi are patent. There are no filling defects in the main pulmonary artery and the central branches. Says mucous plugging is identified in the lower lobe bronchi bilaterally. There is atelectasis/infiltrates in the right lower lobe, right middle lobe and to a lesser extent in the left lower lobe. There is no pleural effusion. Impression There is no central pulmonary embolism or aortic dissection.  Mucous plugging in the lower lobes bilaterally with atelectasis/infiltrates in the lower lobes more on the right side and right middle lobe concerning for pneumonia. CT abdomen pelvis. The liver is of normal architecture. Gallbladder is partially distended. Spleen, pancreas, and the adrenals are normal.  There is heterogeneous enhancement of the kidneys with the areas of decreased attenuation concerning for pyelonephritis. Dilated fluid-filled small bowel loops are noted likely ileus/enteritis with small bowel loops measuring up to 2 cm. Pelvis. Bladder is partially distended with the Hampton catheter and the wall thickening. Colon is normal.  The appendix is partially identified and appears normal. Impression Heterogeneous enhancement of the kidneys concerning for multifocal pyelonephritis. There is also thickening of the urinary bladder. Please correlate with urinalysis. Dilated fluid-filled small bowel loops likely ileus/enteritis. XR ABDOMEN FOR NG/OG/NE TUBE PLACEMENT    Result Date: 7/30/2022  EXAMINATION: ONE SUPINE XRAY VIEW(S) OF THE ABDOMEN 7/30/2022 5:43 pm COMPARISON: 07/30/2022 at 19:30 HISTORY: ORDERING SYSTEM PROVIDED HISTORY: Confirmation of course of NG/OG/NE tube and location of tip of tube TECHNOLOGIST PROVIDED HISTORY: Reason for exam:->Confirmation of course of NG/OG/NE tube and location of tip of tube Portable? ->Yes What reading provider will be dictating this exam?->CRC FINDINGS: Nasogastric tube is identified coursing below the diaphragm with the tip in the stomach. Endotracheal tube is noted. Lungs are stable in appearance. Adequately positioned nasogastric tube. CXR:       NP's Assessment and Plan:   Nasim Weller is a 49 y.o. male who has past medical history of smoking, marijuana abuse, and bimalleolar fracture of left ankle was presented to the ED for complaints of chest pain and shortness of breath (Per family member, complaints of SOB and CP).  Per ED notes, patient's family member reports that patient called her today said that he had chest pain and shortness of breath. Patient arrived to the ED by private vehicle with saturating at 80%, placed on a non-rebreather. Then, patient unable to participate, became minimally responsive, not protecting his airway with severe respiratory distress, and altered mental status. Concerned that patient may have had a seizure. Patient got intubated, sedated, placed on mechanical ventilator, and received 2 g of Keppra as a loading dose. CT head negative for any acute process, CTA was negative for any acute process, and there are pleural effusions in the right lower lobe on CT. Patient's electrolytes, CBC are all within normal limits, serum drug screen did test positive for benzodiazepines and cannabinoid. Patient was given Zosyn for concern for aspiration pneumonia in the right lower lobe. COVID test in ED is positive. Patient got transferred and admitted to the MICU for close hemodynamic monitoring and respiratory failure on mechanical ventilation management.     ASSESSMENT:  Present on Admission:    Acute hypoxemic respiratory failure due to COVID-19 vs PNA    COVID-19 virus detected - febrile, LA and ProCal normal    Suspected, Aspiration pneumonia of right lower and middle lobe - CXR shows infiltrates in the right lower lobe and right middle lobe concerning for pneumonia    Acute encephalopathy, could be d/t COVID vs PNA, vs Marijuana vs UTI - AMS in ED, CT head no acute IC abnormality    Marijuana abuse - UDS + for Benzo and Cannabinoid    Possible, UTI - UA shows trace leukocyte esterase    Concern for Pyelonephritis - as per CT A/P    Concern for ileus/enteritis with small bowel loops measuring up to 2 cm - as per CT A/P    Concern for New on set Seizure    PLAN:    pt got intubated for airway protection    cont on mechanical ventilation    cont oxygen therapy protocol    on breathing treatment: Pulmicort, Brovana     cont on sedation: Fentanyl, Propofol, may switch to Versed if BP cannot tolerate propofol    cont Abx: Zosyn, first dose given in ED    start droplet plus isolation             on Decadron, Vitamin D, Vitamin C and Zinc    monitor inflammatory markers    consult Neurology    consult General Surgery for further evaluation of ileus/enteritis with small bowel loops measuring up to 2 cm    c/w Keppra BID with PRN Versed for seizure (Ativan unavailable at this time according to pharmacist)    cont Neuro, NV checks    Seizure/Fall precautions      F/u daily lab works including CXR, ABG    pending MRI Brain, EEG    pending cultures, legionella, strep, CRP    Chronic Illnesses:     Smoking    Marijuana abuse    Bimalleolar fracture of left ankle      Patient's assessment and plan was discussed with supervising physician on call - Dr. Stuart Osler     Current Treatment Team:  Treatment Team: Attending Provider: Sindhu Hernández DO; Consulting Physician: [address] [place] [address]; Consulting Physician: Naeem Kurtz MD; Registered Nurse: Osiris Rod RN    DIET: Diet NPO   DVT prophylaxis: on Lovenox  GI prophylaxis: IV Protonix  CODE STATUS: Full Code     DANICA Christiansen   Critical Care  7/30/2022 9:56 PM      Electronically signed by SEVERINO Christiansen CNP on 7/30/2022 at 9:56 PM

## 2022-07-31 NOTE — CONSULTS
GENERAL SURGERY  CONSULT NOTE  7/31/2022    Chief Complaint   Patient presents with    Shortness of Breath    Chest Pain     Per family member, complaints of SOB and CP       HPI  Sadaf Ambriz is a 49 y.o. male for whom general surgery was consulted for the evaluation of CT scan findings. The patient presented to the hospital with the primary complaint of SOB and CP per family. He was intubated for a GCS of 3. In the work-up of etiologies, he was found to have COVID-19. He was moved to the MICU after undergoing work-up for these complaints and clinical findings. CT abdomen and pelvis was obtained which showed no obvious pathology but some segments of small bowel which appeared to be dilated up to 2cm in greatest dimension. The patient remains intubated. He nods to questions. He denies abdominal pain. He has no prior abdominal surgical scars. Last bm is unknown. Patient Active Problem List   Diagnosis    Bimalleolar fracture of left ankle    Head injury    Contusion of leg    Acute respiratory failure ([facility] [address]    Acute hypoxemic respiratory failure due to COVID-19 [facility])    Acute encephalopathy    COVID-19 virus detected    Marijuana abuse       Past Medical History:   Diagnosis Date    Bimalleolar fracture of left ankle 9/4/2014       Past Surgical History:   Procedure Laterality Date    ANKLE FRACTURE SURGERY Left 2011    FRACTURE SURGERY  9-18-14    ORIF Left Medial Maleous       Prior to Admission medications    Not on File       No Known Allergies    No family history on file. The patient has a family history that is negative for severe cardiovascular or respiratory issues, negative for reaction to anesthesia.      Social History     Tobacco Use    Smoking status: Some Days     Packs/day: 0.50     Years: 5.00     Pack years: 2.50     Types: Cigars, Cigarettes    Smokeless tobacco: Never   Substance Use Topics    Alcohol use: No    Drug use: No        levETIRAcetam  500 mg IntraVENous Q12H budesonide  0.5 mg Nebulization BID    Arformoterol Tartrate  15 mcg Nebulization BID    ipratropium-albuterol  1 ampule Inhalation Q4H    nicotine  1 patch TransDERmal Daily    chlorhexidine  15 mL Mouth/Throat BID    sodium chloride flush  10 mL IntraVENous 2 times per day    enoxaparin  40 mg SubCUTAneous Daily    piperacillin-tazobactam  4,500 mg IntraVENous Q8H    sodium chloride flush  5-40 mL IntraVENous 2 times per day    pantoprazole (PROTONIX) 40 mg injection  40 mg IntraVENous Daily    dexamethasone  20 mg IntraVENous Q24H    Followed by    Erma Hansen ON 2022] dexamethasone  10 mg IntraVENous Q24H    vitamin D  2,000 Units Oral Daily    ascorbic acid  500 mg Oral Daily    zinc sulfate  50 mg Oral Daily      midazolam 2 mg/hr (22 0600)    sodium chloride 75 mL/hr at 22 0600    fentaNYL 10 mcg/ml in 0.9%  ml infusion 125 mcg/hr (22 0600)    sodium chloride Stopped (22 0326)     midazolam, fentanNYL, sodium chloride, promethazine **OR** ondansetron, polyethylene glycol, sodium chloride flush, [DISCONTINUED] acetaminophen **OR** acetaminophen    REVIEW OF SYSTEMS:  Inable to obtain 2/ patient's mental status       PHYSICAL EXAM:  Temp  Av.7 °F (37.6 °C)  Min: 98.4 °F (36.9 °C)  Max: 100.8 °F (38.2 °C)  Pulse  Av.8  Min: 76  Max: 000  Systolic (55LST), HBT:077 , Min:94 , Max:181     Resp  Av.1  Min: 14  Max: 29  SpO2  Av.3 %  Min: 88 %  Max: 100 %      Intake/Output Summary (Last 24 hours) at 2022 1153  Last data filed at 2022 0845  Gross per 24 hour   Intake 2049.26 ml   Output 955 ml   Net 1094.26 ml         General appearance: lying in bed  Head: NCAT, PERRLA, EOMI, red conjunctiva, intubated and nasogastric tube present  Neck: supple, no masses, trachea midline  Lungs: symmetric chest rise, mechanically ventilated  Heart: warm throughout  Abdomen: soft, ND, no prior surgical scars appreciated  Skin: no obvious rashes or lesions appreciated  Extremities: atraumatic, no focal motor deficits, no open wounds      LABS:  Recent Labs     07/30/22  1725 07/30/22 2230 07/31/22  0527   WBC 6.6 8.0 9.0   HGB 15.3 13.8 13.0   HCT 45.8 41.6 39.7    169 157       Recent Labs     07/30/22  1725 07/30/22 2230 07/31/22  0527    136 138   K 4.8 4.2 4.4    104 108*   CO2 23 23 21*   BUN 10 9 11   CREATININE 1.0 0.8 0.9   GLUCOSE 79 99 127*   CALCIUM 9.7 7.9* 8.1*   PROT 7.8 6.5 6.5   LABALBU 4.3 3.5 3.6   BILITOT 0.3 0.3 0.3   ALKPHOS 111 104 97   AST 29 34 22   ALT 36 39 34       Recent Labs     07/30/22 1725 07/30/22 2230 07/31/22 0527   BILITOT 0.3 0.3 0.3   AST 29 34 22   ALT 36 39 34   ALKPHOS 111 104 97   LABALBU 4.3 3.5 3.6       No results for input(s): POCGLU in the last 72 hours. Recent Labs     07/31/22  0536   PH 7.350   PO2 130.4*   PCO2 41.9   HCO3 22.6   BE -2.9   O2SAT 98.5       Recent Labs     07/31/22  0527   LACTA 0.8         RADIOLOGY:  CT HEAD WO CONTRAST    Result Date: 7/30/2022  EXAMINATION: CT OF THE HEAD WITHOUT CONTRAST; CTA OF THE CHEST; CT OF THE ABDOMEN AND PELVIS WITH CONTRAST  7/30/2022 5:49 pm TECHNIQUE: CT of the head was performed without the administration of intravenous contrast. Automated exposure control, iterative reconstruction, and/or weight based adjustment of the mA/kV was utilized to reduce the radiation dose to as low as reasonably achievable.; CTA of the chest was performed after the administration of intravenous contrast.  Multiplanar reformatted images are provided for review. MIP images are provided for review. Automated exposure control, iterative reconstruction, and/or weight based adjustment of the mA/kV was utilized to reduce the radiation dose to as low as reasonably achievable.; CT of the abdomen and pelvis was performed with the administration of intravenous contrast. Multiplanar reformatted images are provided for review.  Automated exposure control, iterative reconstruction, and/or weight based adjustment of the mA/kV was utilized to reduce the radiation dose to as low as reasonably achievable. COMPARISON: September 4, 2014 HISTORY: ORDERING SYSTEM PROVIDED HISTORY: ams TECHNOLOGIST PROVIDED HISTORY: Has a \"code stroke\" or \"stroke alert\" been called? ->No Reason for exam:->ams Decision Support Exception - unselect if not a suspected or confirmed emergency medical condition->Emergency Medical Condition (MA) What reading provider will be dictating this exam?->CRC FINDINGS: CT head. The ventricles are of normal size and configuration. The brain parenchyma has normal architecture and attenuation. There is no acute stroke, mass or hemorrhage. There is soft tissue density with the low-attenuation completely filling the nasopharynx and nasal cavity which may be due to secretions/inflammation. Underlying malignant process has to be excluded. Direct visualization recommended. No acute intracranial abnormality. Soft tissue density completely filling the nasal cavity and nasopharynx probably inflammatory/secretions. Malignancy has to be excluded and direct visualization recommended. CTA chest. Comparison September 4, 2014 Findings The heart and the great vessels are normal.  Nonenlarged mediastinal and hilar lymph nodes are present. Trachea and major bronchi are patent. There are no filling defects in the main pulmonary artery and the central branches. Says mucous plugging is identified in the lower lobe bronchi bilaterally. There is atelectasis/infiltrates in the right lower lobe, right middle lobe and to a lesser extent in the left lower lobe. There is no pleural effusion. Impression There is no central pulmonary embolism or aortic dissection. Mucous plugging in the lower lobes bilaterally with atelectasis/infiltrates in the lower lobes more on the right side and right middle lobe concerning for pneumonia. CT abdomen pelvis. The liver is of normal architecture. PROVIDED HISTORY: ams TECHNOLOGIST PROVIDED HISTORY: Has a \"code stroke\" or \"stroke alert\" been called? ->No Reason for exam:->ams Decision Support Exception - unselect if not a suspected or confirmed emergency medical condition->Emergency Medical Condition (MA) What reading provider will be dictating this exam?->CRC FINDINGS: CT head. The ventricles are of normal size and configuration. The brain parenchyma has normal architecture and attenuation. There is no acute stroke, mass or hemorrhage. There is soft tissue density with the low-attenuation completely filling the nasopharynx and nasal cavity which may be due to secretions/inflammation. Underlying malignant process has to be excluded. Direct visualization recommended. No acute intracranial abnormality. Soft tissue density completely filling the nasal cavity and nasopharynx probably inflammatory/secretions. Malignancy has to be excluded and direct visualization recommended. CTA chest. Comparison September 4, 2014 Findings The heart and the great vessels are normal.  Nonenlarged mediastinal and hilar lymph nodes are present. Trachea and major bronchi are patent. There are no filling defects in the main pulmonary artery and the central branches. Says mucous plugging is identified in the lower lobe bronchi bilaterally. There is atelectasis/infiltrates in the right lower lobe, right middle lobe and to a lesser extent in the left lower lobe. There is no pleural effusion. Impression There is no central pulmonary embolism or aortic dissection. Mucous plugging in the lower lobes bilaterally with atelectasis/infiltrates in the lower lobes more on the right side and right middle lobe concerning for pneumonia. CT abdomen pelvis. The liver is of normal architecture. Gallbladder is partially distended.  Spleen, pancreas, and the adrenals are normal.  There is heterogeneous enhancement of the kidneys with the areas of decreased attenuation concerning for pyelonephritis. Dilated fluid-filled small bowel loops are noted likely ileus/enteritis with small bowel loops measuring up to 2 cm. Pelvis. Bladder is partially distended with the Hampton catheter and the wall thickening. Colon is normal.  The appendix is partially identified and appears normal. Impression Heterogeneous enhancement of the kidneys concerning for multifocal pyelonephritis. There is also thickening of the urinary bladder. Please correlate with urinalysis. Dilated fluid-filled small bowel loops likely ileus/enteritis. XR CHEST PORTABLE    Result Date: 7/30/2022  EXAMINATION: ONE XRAY VIEW OF THE CHEST 7/30/2022 7:43 pm COMPARISON: Previous CT scan on the same day. HISTORY: ORDERING SYSTEM PROVIDED HISTORY: Post tube TECHNOLOGIST PROVIDED HISTORY: Reason for exam:->Post tube What reading provider will be dictating this exam?->CRC FINDINGS: Heart size is normal.  Endotracheal tube is 4.7 cm above the rodlofo. Nasogastric tube is noted. There is atelectasis/infiltrates in the right lower lobe and possibly in the right middle lobe. There may be minimal atelectasis in the left base. Atelectasis/infiltrates in the right lower lobe and right middle lobe concerning for pneumonia. CTA PULMONARY W CONTRAST    Result Date: 7/30/2022  EXAMINATION: CT OF THE HEAD WITHOUT CONTRAST; CTA OF THE CHEST; CT OF THE ABDOMEN AND PELVIS WITH CONTRAST  7/30/2022 5:49 pm TECHNIQUE: CT of the head was performed without the administration of intravenous contrast. Automated exposure control, iterative reconstruction, and/or weight based adjustment of the mA/kV was utilized to reduce the radiation dose to as low as reasonably achievable.; CTA of the chest was performed after the administration of intravenous contrast.  Multiplanar reformatted images are provided for review. MIP images are provided for review.  Automated exposure control, iterative reconstruction, and/or weight based adjustment of the mA/kV was utilized to reduce the radiation dose to as low as reasonably achievable.; CT of the abdomen and pelvis was performed with the administration of intravenous contrast. Multiplanar reformatted images are provided for review. Automated exposure control, iterative reconstruction, and/or weight based adjustment of the mA/kV was utilized to reduce the radiation dose to as low as reasonably achievable. COMPARISON: September 4, 2014 HISTORY: ORDERING SYSTEM PROVIDED HISTORY: ams TECHNOLOGIST PROVIDED HISTORY: Has a \"code stroke\" or \"stroke alert\" been called? ->No Reason for exam:->ams Decision Support Exception - unselect if not a suspected or confirmed emergency medical condition->Emergency Medical Condition ([place]) What reading provider will be dictating this exam?->CRC FINDINGS: CT head. The ventricles are of normal size and configuration. The brain parenchyma has normal architecture and attenuation. There is no acute stroke, mass or hemorrhage. There is soft tissue density with the low-attenuation completely filling the nasopharynx and nasal cavity which may be due to secretions/inflammation. Underlying malignant process has to be excluded. Direct visualization recommended. No acute intracranial abnormality. Soft tissue density completely filling the nasal cavity and nasopharynx probably inflammatory/secretions. Malignancy has to be excluded and direct visualization recommended. CTA chest. Comparison September 4, 2014 Findings The heart and the great vessels are normal.  Nonenlarged mediastinal and hilar lymph nodes are present. Trachea and major bronchi are patent. There are no filling defects in the main pulmonary artery and the central branches. Says mucous plugging is identified in the lower lobe bronchi bilaterally. There is atelectasis/infiltrates in the right lower lobe, right middle lobe and to a lesser extent in the left lower lobe. There is no pleural effusion.  Impression There is no central pulmonary embolism or aortic dissection. Mucous plugging in the lower lobes bilaterally with atelectasis/infiltrates in the lower lobes more on the right side and right middle lobe concerning for pneumonia. CT abdomen pelvis. The liver is of normal architecture. Gallbladder is partially distended. Spleen, pancreas, and the adrenals are normal.  There is heterogeneous enhancement of the kidneys with the areas of decreased attenuation concerning for pyelonephritis. Dilated fluid-filled small bowel loops are noted likely ileus/enteritis with small bowel loops measuring up to 2 cm. Pelvis. Bladder is partially distended with the Hampton catheter and the wall thickening. Colon is normal.  The appendix is partially identified and appears normal. Impression Heterogeneous enhancement of the kidneys concerning for multifocal pyelonephritis. There is also thickening of the urinary bladder. Please correlate with urinalysis. Dilated fluid-filled small bowel loops likely ileus/enteritis. XR ABDOMEN FOR NG/OG/NE TUBE PLACEMENT    Result Date: 7/30/2022  EXAMINATION: ONE SUPINE XRAY VIEW(S) OF THE ABDOMEN 7/30/2022 5:43 pm COMPARISON: 07/30/2022 at 19:30 HISTORY: ORDERING SYSTEM PROVIDED HISTORY: Confirmation of course of NG/OG/NE tube and location of tip of tube TECHNOLOGIST PROVIDED HISTORY: Reason for exam:->Confirmation of course of NG/OG/NE tube and location of tip of tube Portable? ->Yes What reading provider will be dictating this exam?->CRC FINDINGS: Nasogastric tube is identified coursing below the diaphragm with the tip in the stomach. Endotracheal tube is noted. Lungs are stable in appearance. Adequately positioned nasogastric tube. I have reviewed the relevant labs/imaging from this admission and my interpretation is included in my assessment and plan. ASSESSMENT:  49 y.o. male with COVID-19 positivity who presented to the hospital reportedly with chest pain and SOB.  CT of the abdomen prompted consult to general surgery for evaluation. The patient denies abdominal pain. I spoke to his cousin over the phone and the patient has not had any enteritis-like symptoms, obstructive symptoms, nor has he had abdominal operations in the past.      PLAN:  Servin Ready for PO (regular diet) or tube feedings     If intolerance, please page general surgery.       Maki Jasso MD  General Surgery Resident, PGY-4    Electronically signed on 7/31/22 at 11:53 AM EDT

## 2022-07-31 NOTE — PLAN OF CARE
Problem: Discharge Planning  Goal: Discharge to home or other facility with appropriate resources  Outcome: Not Progressing     Problem: Musculoskeletal - Adult  Goal: Return mobility to safest level of function  Outcome: Not Progressing  Goal: Return ADL status to a safe level of function  Outcome: Not Progressing     Problem: Gastrointestinal - Adult  Goal: Maintains adequate nutritional intake  Outcome: Not Progressing

## 2022-08-01 ENCOUNTER — APPOINTMENT (OUTPATIENT)
Dept: GENERAL RADIOLOGY | Age: 47
DRG: 137 | End: 2022-08-01
Payer: MEDICAID

## 2022-08-01 ENCOUNTER — APPOINTMENT (OUTPATIENT)
Dept: MRI IMAGING | Age: 47
DRG: 137 | End: 2022-08-01
Payer: MEDICAID

## 2022-08-01 ENCOUNTER — APPOINTMENT (OUTPATIENT)
Dept: ULTRASOUND IMAGING | Age: 47
DRG: 137 | End: 2022-08-01
Payer: MEDICAID

## 2022-08-01 PROBLEM — R40.2430 GLASGOW COMA SCALE TOTAL SCORE 3-8 (HCC): Status: ACTIVE | Noted: 2022-08-01

## 2022-08-01 LAB
AADO2: 67.8 MMHG
AADO2: 82.6 MMHG
ACINETOBACTER CALCOAC BAUMANNII COMPLEX BY PCR: NOT DETECTED
ALBUMIN SERPL-MCNC: 3 G/DL (ref 3.5–5.2)
ALP BLD-CCNC: 74 U/L (ref 40–129)
ALT SERPL-CCNC: 22 U/L (ref 0–40)
ANION GAP SERPL CALCULATED.3IONS-SCNC: 7 MMOL/L (ref 7–16)
AST SERPL-CCNC: 18 U/L (ref 0–39)
B.E.: -1 MMOL/L (ref -3–3)
B.E.: 0 MMOL/L (ref -3–3)
BACTEROIDES FRAGILIS BY PCR: NOT DETECTED
BASOPHILS ABSOLUTE: 0.01 E9/L (ref 0–0.2)
BASOPHILS RELATIVE PERCENT: 0.1 % (ref 0–2)
BILIRUB SERPL-MCNC: <0.2 MG/DL (ref 0–1.2)
BOTTLE TYPE: NORMAL
BUN BLDV-MCNC: 15 MG/DL (ref 6–20)
C-REACTIVE PROTEIN: 5 MG/DL (ref 0–0.4)
CALCIUM IONIZED: 1.23 MMOL/L (ref 1.15–1.33)
CALCIUM SERPL-MCNC: 8.1 MG/DL (ref 8.6–10.2)
CANDIDA ALBICANS BY PCR: NOT DETECTED
CANDIDA AURIS BY PCR: NOT DETECTED
CANDIDA GLABRATA BY PCR: NOT DETECTED
CANDIDA KRUSEI BY PCR: NOT DETECTED
CANDIDA PARAPSILOSIS BY PCR: NOT DETECTED
CANDIDA TROPICALIS BY PCR: NOT DETECTED
CHLORIDE BLD-SCNC: 105 MMOL/L (ref 98–107)
CO2: 24 MMOL/L (ref 22–29)
COHB: 0.7 % (ref 0–1.5)
COHB: 0.9 % (ref 0–1.5)
CREAT SERPL-MCNC: 0.8 MG/DL (ref 0.7–1.2)
CRITICAL: ABNORMAL
CRITICAL: ABNORMAL
CRYPTOCOCCUS NEOFORMANS/GATTII BY PCR: NOT DETECTED
DATE ANALYZED: ABNORMAL
DATE ANALYZED: ABNORMAL
DATE OF COLLECTION: ABNORMAL
DATE OF COLLECTION: ABNORMAL
EKG ATRIAL RATE: 107 BPM
EKG P AXIS: 79 DEGREES
EKG P-R INTERVAL: 164 MS
EKG Q-T INTERVAL: 348 MS
EKG QRS DURATION: 88 MS
EKG QTC CALCULATION (BAZETT): 464 MS
EKG R AXIS: 67 DEGREES
EKG T AXIS: 34 DEGREES
EKG VENTRICULAR RATE: 107 BPM
ENTEROBACTER CLOACAE COMPLEX BY PCR: NOT DETECTED
ENTEROBACTERALES BY PCR: NOT DETECTED
ENTEROCOCCUS FAECALIS BY PCR: NOT DETECTED
ENTEROCOCCUS FAECIUM BY PCR: NOT DETECTED
EOSINOPHILS ABSOLUTE: 0 E9/L (ref 0.05–0.5)
EOSINOPHILS RELATIVE PERCENT: 0 % (ref 0–6)
ESCHERICHIA COLI BY PCR: NOT DETECTED
FIO2: 40 %
FIO2: 40 %
GFR AFRICAN AMERICAN: >60
GFR NON-AFRICAN AMERICAN: >60 ML/MIN/1.73
GLUCOSE BLD-MCNC: 95 MG/DL (ref 74–99)
HAEMOPHILUS INFLUENZAE BY PCR: NOT DETECTED
HCO3: 22.3 MMOL/L (ref 22–26)
HCO3: 25.6 MMOL/L (ref 22–26)
HCT VFR BLD CALC: 36.2 % (ref 37–54)
HEMOGLOBIN: 12.1 G/DL (ref 12.5–16.5)
HHB: 0.7 % (ref 0–5)
HHB: 1 % (ref 0–5)
IMMATURE GRANULOCYTES #: 0.05 E9/L
IMMATURE GRANULOCYTES %: 0.5 % (ref 0–5)
KLEBSIELLA AEROGENES BY PCR: NOT DETECTED
KLEBSIELLA OXYTOCA BY PCR: NOT DETECTED
KLEBSIELLA PNEUMONIAE GROUP BY PCR: NOT DETECTED
LAB: ABNORMAL
LAB: ABNORMAL
LISTERIA MONOCYTOGENES BY PCR: NOT DETECTED
LYMPHOCYTES ABSOLUTE: 0.8 E9/L (ref 1.5–4)
LYMPHOCYTES RELATIVE PERCENT: 7.7 % (ref 20–42)
Lab: ABNORMAL
Lab: ABNORMAL
MAGNESIUM: 2 MG/DL (ref 1.6–2.6)
MCH RBC QN AUTO: 28.9 PG (ref 26–35)
MCHC RBC AUTO-ENTMCNC: 33.4 % (ref 32–34.5)
MCV RBC AUTO: 86.6 FL (ref 80–99.9)
METHB: 0.3 % (ref 0–1.5)
METHB: 0.4 % (ref 0–1.5)
MODE: AC
MODE: AC
MONOCYTES ABSOLUTE: 0.43 E9/L (ref 0.1–0.95)
MONOCYTES RELATIVE PERCENT: 4.1 % (ref 2–12)
NEISSERIA MENINGITIDIS BY PCR: NOT DETECTED
NEUTROPHILS ABSOLUTE: 9.12 E9/L (ref 1.8–7.3)
NEUTROPHILS RELATIVE PERCENT: 87.6 % (ref 43–80)
O2 SATURATION: 98.7 % (ref 92–98.5)
O2 SATURATION: 99.2 % (ref 92–98.5)
O2HB: 97.8 % (ref 94–97)
O2HB: 98.2 % (ref 94–97)
OPERATOR ID: 1632
OPERATOR ID: ABNORMAL
ORDER NUMBER: NORMAL
PATIENT TEMP: 37 C
PATIENT TEMP: 37 C
PCO2: 33.2 MMHG (ref 35–45)
PCO2: 45.2 MMHG (ref 35–45)
PDW BLD-RTO: 13.2 FL (ref 11.5–15)
PEEP/CPAP: 5 CMH2O
PEEP/CPAP: 8 CMH2O
PFO2: 3.52 MMHG/%
PFO2: 4.23 MMHG/%
PH BLOOD GAS: 7.37 (ref 7.35–7.45)
PH BLOOD GAS: 7.45 (ref 7.35–7.45)
PHOSPHORUS: 2.4 MG/DL (ref 2.5–4.5)
PLATELET # BLD: 146 E9/L (ref 130–450)
PMV BLD AUTO: 10.5 FL (ref 7–12)
PO2: 140.6 MMHG (ref 75–100)
PO2: 169.2 MMHG (ref 75–100)
POTASSIUM REFLEX MAGNESIUM: 4.4 MMOL/L (ref 3.5–5)
PROTEUS SPECIES BY PCR: NOT DETECTED
PSEUDOMONAS AERUGINOSA BY PCR: NOT DETECTED
RBC # BLD: 4.18 E12/L (ref 3.8–5.8)
RI(T): 0.4
RI(T): 0.59
RR MECHANICAL: 14 B/MIN
RR MECHANICAL: 14 B/MIN
SALMONELLA SPECIES BY PCR: NOT DETECTED
SERRATIA MARCESCENS BY PCR: NOT DETECTED
SODIUM BLD-SCNC: 136 MMOL/L (ref 132–146)
SOURCE OF BLOOD CULTURE: NORMAL
SOURCE, BLOOD GAS: ABNORMAL
SOURCE, BLOOD GAS: ABNORMAL
STAPHYLOCOCCUS AUREUS BY PCR: NOT DETECTED
STAPHYLOCOCCUS EPIDERMIDIS BY PCR: NOT DETECTED
STAPHYLOCOCCUS LUGDUNENSIS BY PCR: NOT DETECTED
STAPHYLOCOCCUS SPECIES BY PCR: NOT DETECTED
STENOTROPHOMONAS MALTOPHILIA BY PCR: NOT DETECTED
STREPTOCOCCUS AGALACTIAE BY PCR: NOT DETECTED
STREPTOCOCCUS PNEUMONIAE BY PCR: NOT DETECTED
STREPTOCOCCUS PYOGENES  BY PCR: NOT DETECTED
STREPTOCOCCUS SPECIES BY PCR: NOT DETECTED
THB: 13.2 G/DL (ref 11.5–16.5)
THB: 13.4 G/DL (ref 11.5–16.5)
TIME ANALYZED: 1553
TIME ANALYZED: 503
TOTAL PROTEIN: 5.8 G/DL (ref 6.4–8.3)
VT MECHANICAL: 400 ML
VT MECHANICAL: 400 ML
WBC # BLD: 10.4 E9/L (ref 4.5–11.5)

## 2022-08-01 PROCEDURE — 2500000003 HC RX 250 WO HCPCS: Performed by: NURSE PRACTITIONER

## 2022-08-01 PROCEDURE — 2580000003 HC RX 258: Performed by: NURSE PRACTITIONER

## 2022-08-01 PROCEDURE — 6360000002 HC RX W HCPCS: Performed by: NURSE PRACTITIONER

## 2022-08-01 PROCEDURE — A4216 STERILE WATER/SALINE, 10 ML: HCPCS | Performed by: NURSE PRACTITIONER

## 2022-08-01 PROCEDURE — 6370000000 HC RX 637 (ALT 250 FOR IP): Performed by: NURSE PRACTITIONER

## 2022-08-01 PROCEDURE — 87040 BLOOD CULTURE FOR BACTERIA: CPT

## 2022-08-01 PROCEDURE — 6370000000 HC RX 637 (ALT 250 FOR IP)

## 2022-08-01 PROCEDURE — 70551 MRI BRAIN STEM W/O DYE: CPT

## 2022-08-01 PROCEDURE — 94003 VENT MGMT INPAT SUBQ DAY: CPT

## 2022-08-01 PROCEDURE — 6360000002 HC RX W HCPCS: Performed by: FAMILY MEDICINE

## 2022-08-01 PROCEDURE — 84100 ASSAY OF PHOSPHORUS: CPT

## 2022-08-01 PROCEDURE — 71045 X-RAY EXAM CHEST 1 VIEW: CPT

## 2022-08-01 PROCEDURE — 2500000003 HC RX 250 WO HCPCS: Performed by: FAMILY MEDICINE

## 2022-08-01 PROCEDURE — 80053 COMPREHEN METABOLIC PANEL: CPT

## 2022-08-01 PROCEDURE — XW0DXM6 INTRODUCTION OF BARICITINIB INTO MOUTH AND PHARYNX, EXTERNAL APPROACH, NEW TECHNOLOGY GROUP 6: ICD-10-PCS | Performed by: NURSE PRACTITIONER

## 2022-08-01 PROCEDURE — 82805 BLOOD GASES W/O2 SATURATION: CPT

## 2022-08-01 PROCEDURE — 2580000003 HC RX 258: Performed by: FAMILY MEDICINE

## 2022-08-01 PROCEDURE — 85025 COMPLETE CBC W/AUTO DIFF WBC: CPT

## 2022-08-01 PROCEDURE — 83735 ASSAY OF MAGNESIUM: CPT

## 2022-08-01 PROCEDURE — 86140 C-REACTIVE PROTEIN: CPT

## 2022-08-01 PROCEDURE — 82330 ASSAY OF CALCIUM: CPT

## 2022-08-01 PROCEDURE — 99291 CRITICAL CARE FIRST HOUR: CPT | Performed by: INTERNAL MEDICINE

## 2022-08-01 PROCEDURE — 93970 EXTREMITY STUDY: CPT

## 2022-08-01 PROCEDURE — 93971 EXTREMITY STUDY: CPT | Performed by: RADIOLOGY

## 2022-08-01 PROCEDURE — 94640 AIRWAY INHALATION TREATMENT: CPT

## 2022-08-01 PROCEDURE — C9113 INJ PANTOPRAZOLE SODIUM, VIA: HCPCS | Performed by: NURSE PRACTITIONER

## 2022-08-01 PROCEDURE — 36415 COLL VENOUS BLD VENIPUNCTURE: CPT

## 2022-08-01 PROCEDURE — 2000000000 HC ICU R&B

## 2022-08-01 RX ORDER — DOCUSATE SODIUM 50 MG/5ML
100 LIQUID ORAL DAILY
Status: DISCONTINUED | OUTPATIENT
Start: 2022-08-01 | End: 2022-08-04 | Stop reason: HOSPADM

## 2022-08-01 RX ORDER — MIDAZOLAM HYDROCHLORIDE 2 MG/2ML
2 INJECTION, SOLUTION INTRAMUSCULAR; INTRAVENOUS EVERY 4 HOURS PRN
Status: DISCONTINUED | OUTPATIENT
Start: 2022-08-01 | End: 2022-08-02

## 2022-08-01 RX ADMIN — Medication 150 MCG/HR: at 06:33

## 2022-08-01 RX ADMIN — SODIUM CHLORIDE 40 MG: 9 INJECTION INTRAMUSCULAR; INTRAVENOUS; SUBCUTANEOUS at 08:59

## 2022-08-01 RX ADMIN — Medication 50 MG: at 09:00

## 2022-08-01 RX ADMIN — PIPERACILLIN AND TAZOBACTAM 4500 MG: 4; .5 INJECTION, POWDER, FOR SOLUTION INTRAVENOUS at 04:45

## 2022-08-01 RX ADMIN — PIPERACILLIN AND TAZOBACTAM 4500 MG: 4; .5 INJECTION, POWDER, FOR SOLUTION INTRAVENOUS at 11:11

## 2022-08-01 RX ADMIN — 0.12% CHLORHEXIDINE GLUCONATE 15 ML: 1.2 RINSE ORAL at 20:02

## 2022-08-01 RX ADMIN — IPRATROPIUM BROMIDE AND ALBUTEROL SULFATE 1 AMPULE: 2.5; .5 SOLUTION RESPIRATORY (INHALATION) at 20:22

## 2022-08-01 RX ADMIN — Medication 125 MCG/HR: at 14:20

## 2022-08-01 RX ADMIN — SENNOSIDES 5 ML: 8.8 SYRUP ORAL at 20:02

## 2022-08-01 RX ADMIN — PIPERACILLIN AND TAZOBACTAM 4500 MG: 4; .5 INJECTION, POWDER, FOR SOLUTION INTRAVENOUS at 20:02

## 2022-08-01 RX ADMIN — SODIUM CHLORIDE: 9 INJECTION, SOLUTION INTRAVENOUS at 22:50

## 2022-08-01 RX ADMIN — DOCUSATE SODIUM LIQUID 100 MG: 100 LIQUID ORAL at 16:56

## 2022-08-01 RX ADMIN — BUDESONIDE 500 MCG: 0.5 SUSPENSION RESPIRATORY (INHALATION) at 08:12

## 2022-08-01 RX ADMIN — DEXAMETHASONE SODIUM PHOSPHATE 20 MG: 4 INJECTION, SOLUTION INTRAMUSCULAR; INTRAVENOUS at 08:58

## 2022-08-01 RX ADMIN — SODIUM CHLORIDE 0.2 MCG/KG/HR: 9 INJECTION, SOLUTION INTRAVENOUS at 11:15

## 2022-08-01 RX ADMIN — SODIUM CHLORIDE, PRESERVATIVE FREE 10 ML: 5 INJECTION INTRAVENOUS at 20:02

## 2022-08-01 RX ADMIN — LEVETIRACETAM 500 MG: 5 INJECTION INTRAVENOUS at 16:54

## 2022-08-01 RX ADMIN — SODIUM CHLORIDE, PRESERVATIVE FREE 10 ML: 5 INJECTION INTRAVENOUS at 20:03

## 2022-08-01 RX ADMIN — ARFORMOTEROL TARTRATE 15 MCG: 15 SOLUTION RESPIRATORY (INHALATION) at 08:13

## 2022-08-01 RX ADMIN — ENOXAPARIN SODIUM 40 MG: 100 INJECTION SUBCUTANEOUS at 09:00

## 2022-08-01 RX ADMIN — 0.12% CHLORHEXIDINE GLUCONATE 15 ML: 1.2 RINSE ORAL at 08:59

## 2022-08-01 RX ADMIN — MIDAZOLAM 2 MG: 1 INJECTION INTRAMUSCULAR; INTRAVENOUS at 17:02

## 2022-08-01 RX ADMIN — Medication 150 MCG/HR: at 21:12

## 2022-08-01 RX ADMIN — IPRATROPIUM BROMIDE AND ALBUTEROL SULFATE 1 AMPULE: 2.5; .5 SOLUTION RESPIRATORY (INHALATION) at 08:12

## 2022-08-01 RX ADMIN — ARFORMOTEROL TARTRATE 15 MCG: 15 SOLUTION RESPIRATORY (INHALATION) at 20:22

## 2022-08-01 RX ADMIN — IPRATROPIUM BROMIDE AND ALBUTEROL SULFATE 1 AMPULE: 2.5; .5 SOLUTION RESPIRATORY (INHALATION) at 03:09

## 2022-08-01 RX ADMIN — SODIUM PHOSPHATE, MONOBASIC, MONOHYDRATE AND SODIUM PHOSPHATE, DIBASIC, ANHYDROUS 15 MMOL: 276; 142 INJECTION, SOLUTION INTRAVENOUS at 08:56

## 2022-08-01 RX ADMIN — BUDESONIDE 500 MCG: 0.5 SUSPENSION RESPIRATORY (INHALATION) at 20:22

## 2022-08-01 RX ADMIN — VANCOMYCIN HYDROCHLORIDE 1000 MG: 1 INJECTION, POWDER, LYOPHILIZED, FOR SOLUTION INTRAVENOUS at 13:55

## 2022-08-01 RX ADMIN — IPRATROPIUM BROMIDE AND ALBUTEROL SULFATE 1 AMPULE: 2.5; .5 SOLUTION RESPIRATORY (INHALATION) at 11:16

## 2022-08-01 RX ADMIN — IPRATROPIUM BROMIDE AND ALBUTEROL SULFATE 1 AMPULE: 2.5; .5 SOLUTION RESPIRATORY (INHALATION) at 18:05

## 2022-08-01 RX ADMIN — Medication 2000 UNITS: at 08:59

## 2022-08-01 RX ADMIN — OXYCODONE HYDROCHLORIDE AND ACETAMINOPHEN 500 MG: 500 TABLET ORAL at 09:00

## 2022-08-01 RX ADMIN — LEVETIRACETAM 500 MG: 5 INJECTION INTRAVENOUS at 06:36

## 2022-08-01 ASSESSMENT — PULMONARY FUNCTION TESTS
PIF_VALUE: 21
PIF_VALUE: 17
PIF_VALUE: 20
PIF_VALUE: 16
PIF_VALUE: 22
PIF_VALUE: 21
PIF_VALUE: 41
PIF_VALUE: 19
PIF_VALUE: 17
PIF_VALUE: 20
PIF_VALUE: 19
PIF_VALUE: 18
PIF_VALUE: 16
PIF_VALUE: 6
PIF_VALUE: 16
PIF_VALUE: 18
PIF_VALUE: 19
PIF_VALUE: 19
PIF_VALUE: 21
PIF_VALUE: 18
PIF_VALUE: 16
PIF_VALUE: 19
PIF_VALUE: 17
PIF_VALUE: 17
PIF_VALUE: 19
PIF_VALUE: 14

## 2022-08-01 ASSESSMENT — PAIN SCALES - GENERAL
PAINLEVEL_OUTOF10: 0

## 2022-08-01 NOTE — PROGRESS NOTES
Patient continues to attempt to pull at lines/tubes when restraints are loosened. Patient verbally redirected. Bilateral wrist restraints continued.

## 2022-08-01 NOTE — PROGRESS NOTES
Hospitalist Progress Note      Synopsis: Patient admitted on 7/30/2022 with chest pain and shortness of breath. Patient became unresponsive and developed acute hypoxic respiratory failure. He was placed on an non re-breather and ultimately intubated for airway protection. CTA of the chest showed no PE but possible bilateral pneumonia vs atelectasis worse on the right. Patient started on Zosyn for aspiration Pneumonia. He did test positive for COVID. Started on decadron, zinc, and vitamin C. Given concern for possible seizure. Patient loaded with keppra and neurology consulted. CTAP showed possible pyelonephritis and enteritis/ileus. General surgery consulted and patient is on zosyn and vancomycin. EEG showed moderate encephalopathy. General surgery evaluated the patient and started tube feeds. ASSESSMENT AND PLAN     Acute Hypoxic Respiratory Failure  -Patient is currently intubated and sedated  -Continue ventilator support  -Likely multifactorial with COVID, Aspiration Pneumonia, and ?seizure    Aspiration Pneumonia  Patient being covered with Zosyn and vancomycin. COVID 19  -Patient is on decadron, vitamin C, and Zinc     Seizure  -Patient loaded with keppra   -Neurology consulted  -MRI  - EEG showed moderate encephalopathy    Enteritis/Ileus  General surgery consulted  Patient okay for tube feeding. ? Pyelo  Patient on zosyn    Rotator cuff tear   -Outpatient follow up as per ortho. Management as per ICU team.       Subjective        Exam:  /72   Pulse 74   Temp 98.6 °F (37 °C) (Bladder)   Resp 16   Ht 6' 2\" (1.88 m)   Wt 151 lb 14.4 oz (68.9 kg)   SpO2 95%   BMI 20.04 kg/m²     **Patient with COVID on ventilator--Management as per ICU and consultants** Observed from window. General appearance: No apparent distress, appears stated age   Respiratory:  Normal respiratory effort.  Ventilated   Cardiovascular: Regular rate   Musculoskeletal:Intubated and sedated   Skin:  No rashes Neurologic: intubated, sedated.      Medications:  Reviewed    Infusion Medications    dexmedetomidine (PRECEDEX) IV infusion 0.6 mcg/kg/hr (08/01/22 1232)    midazolam Stopped (08/01/22 1232)    sodium chloride 75 mL/hr at 08/01/22 0600    fentaNYL 10 mcg/ml in 0.9%  ml infusion 150 mcg/hr (08/01/22 0373)    sodium chloride Stopped (08/01/22 0445)     Scheduled Medications    [START ON 8/2/2022] baricitinib  4 mg Oral Daily    vancomycin  1,000 mg IntraVENous Q12H    levETIRAcetam  500 mg IntraVENous Q12H    budesonide  0.5 mg Nebulization BID    Arformoterol Tartrate  15 mcg Nebulization BID    ipratropium-albuterol  1 ampule Inhalation Q4H    nicotine  1 patch TransDERmal Daily    chlorhexidine  15 mL Mouth/Throat BID    sodium chloride flush  10 mL IntraVENous 2 times per day    enoxaparin  40 mg SubCUTAneous Daily    piperacillin-tazobactam  4,500 mg IntraVENous Q8H    sodium chloride flush  5-40 mL IntraVENous 2 times per day    pantoprazole (PROTONIX) 40 mg injection  40 mg IntraVENous Daily    dexamethasone  20 mg IntraVENous Q24H    Followed by    Miguelito Powell ON 8/5/2022] dexamethasone  10 mg IntraVENous Q24H    vitamin D  2,000 Units Oral Daily    ascorbic acid  500 mg Oral Daily    zinc sulfate  50 mg Oral Daily     PRN Meds: midazolam, fentanNYL, sodium chloride, promethazine **OR** ondansetron, polyethylene glycol, sodium chloride flush, [DISCONTINUED] acetaminophen **OR** acetaminophen    I/O    Intake/Output Summary (Last 24 hours) at 8/1/2022 1322  Last data filed at 8/1/2022 1300  Gross per 24 hour   Intake 2581.92 ml   Output 1975 ml   Net 606.92 ml       Labs:   Recent Labs     07/30/22 2230 07/31/22 0527 08/01/22  0435   WBC 8.0 9.0 10.4   HGB 13.8 13.0 12.1*   HCT 41.6 39.7 36.2*    157 146       Recent Labs     07/30/22 2230 07/31/22 0527 08/01/22  0435    138 136   K 4.2 4.4 4.4    108* 105   CO2 23 21* 24   BUN 9 11 15   CREATININE 0.8 0.9 0.8   CALCIUM 7.9* 8.1* 8.1* PHOS 2.7  --  2.4*       Recent Labs     07/30/22  2230 07/31/22  0527 08/01/22  0435   PROT 6.5 6.5 5.8*   ALKPHOS 104 97 74   ALT 39 34 22   AST 34 22 18   BILITOT 0.3 0.3 <0.2       Recent Labs     07/30/22  1725 07/31/22  0527   INR 1.3 1.4       No results for input(s): Yoselin Livingston in the last 72 hours. Chronic labs:  Lab Results   Component Value Date    INR 1.4 07/31/2022       Radiology:  Imaging studies reviewed today.    +++++++++++++++++++++++++++++++++++++++++++++++++  Siomara Singletary [facility].  +++++++++++++++++++++++++++++++++++++++++++++++++  NOTE: This report was transcribed using voice recognition software. Every effort was made to ensure accuracy; however, inadvertent computerized transcription errors may be present.

## 2022-08-01 NOTE — PLAN OF CARE
Problem: Discharge Planning  Goal: Discharge to home or other facility with appropriate resources  Outcome: Not Progressing  Flowsheets (Taken 7/31/2022 2000)  Discharge to home or other facility with appropriate resources: Identify barriers to discharge with patient and caregiver     Problem: Musculoskeletal - Adult  Goal: Return mobility to safest level of function  Outcome: Not Progressing  Flowsheets (Taken 7/31/2022 1215 by Connie Boyce RN)  Return Mobility to Safest Level of Function: Assess patient stability and activity tolerance for standing, transferring and ambulating with or without assistive devices  Goal: Return ADL status to a safe level of function  Outcome: Not Progressing  Problem: Discharge Planning  Goal: Discharge to home or other facility with appropriate resources  Outcome: Not Progressing  Flowsheets (Taken 7/31/2022 2000)  Discharge to home or other facility with appropriate resources: Identify barriers to discharge with patient and caregiver     Problem: Musculoskeletal - Adult  Goal: Return mobility to safest level of function  Outcome: Not Progressing  Flowsheets (Taken 7/31/2022 1215 by Connie Boyce RN)  Return Mobility to Safest Level of Function: Assess patient stability and activity tolerance for standing, transferring and ambulating with or without assistive devices  Goal: Return ADL status to a safe level of function  Outcome: Not Progressing  Flowsheets (Taken 7/31/2022 1215 by Connie Boyce RN)  Return ADL Status to a Safe Level of Function: Administer medication as ordered     Problem: Gastrointestinal - Adult  Goal: Maintains adequate nutritional intake  Outcome: Not Progressing     Problem: Safety - Medical Restraint  Goal: Remains free of injury from restraints (Restraint for Interference with Medical Device)  Description: INTERVENTIONS:  1. Determine that other, less restrictive measures have been tried or would not be effective before applying the restraint  2. Evaluate the patient's condition at the time of restraint application  3. Inform patient/family regarding the reason for restraint  4.  Q2H: Monitor safety, psychosocial status, comfort, nutrition and hydration  Outcome: Progressing  Flowsheets  Taken 7/31/2022 2000 by Brinda Quintana RN  Remains free of injury from restraints (restraint for interference with medical device):   Determine that other, less restrictive measures have been tried or would not be effective before applying the restraint   Evaluate the patient's condition at the time of restraint application   Inform patient/family regarding the reason for restraint   Every 2 hours: Monitor safety, psychosocial status, comfort, nutrition and hydration  Taken 7/31/2022 1800 by Sandy Willoughby RN  Remains free of injury from restraints (restraint for interference with medical device): Determine that other, less restrictive measures have been tried or would not be effective before applying the restraint  Taken 7/31/2022 1600 by Sandy Willoughby RN  Remains free of injury from restraints (restraint for interference with medical device): Determine that other, less restrictive measures have been tried or would not be effective before applying the restraint  Taken 7/31/2022 1400 by Sandy Willoughby RN  Remains free of injury from restraints (restraint for interference with medical device): Determine that other, less restrictive measures have been tried or would not be effective before applying the restraint  Taken 7/31/2022 1200 by Sandy Willoughby RN  Remains free of injury from restraints (restraint for interference with medical device): Determine that other, less restrictive measures have been tried or would not be effective before applying the restraint  Taken 7/31/2022 1000 by Sandy Willoughby RN  Remains free of injury from restraints (restraint for interference with medical device): Determine that other, less restrictive measures have been tried or would not oxygenation: Assess for changes in respiratory status     Problem: Cardiovascular - Adult  Goal: Maintains optimal cardiac output and hemodynamic stability  Outcome: Progressing  Flowsheets  Taken 7/31/2022 2000 by Antoni Chiu RN  Maintains optimal cardiac output and hemodynamic stability:   Monitor blood pressure and heart rate   Monitor urine output and notify Licensed Independent Practitioner for values outside of normal range  Taken 7/31/2022 1215 by Tracy Quintero RN  Maintains optimal cardiac output and hemodynamic stability: Monitor blood pressure and heart rate  Goal: Absence of cardiac dysrhythmias or at baseline  Outcome: Progressing  Flowsheets  Taken 7/31/2022 2000 by Antoni Chiu RN  Absence of cardiac dysrhythmias or at baseline: Monitor cardiac rate and rhythm  Taken 7/31/2022 1215 by Tracy Quintero RN  Absence of cardiac dysrhythmias or at baseline: Monitor cardiac rate and rhythm     Problem: Skin/Tissue Integrity - Adult  Goal: Skin integrity remains intact  Outcome: Progressing  Flowsheets  Taken 7/31/2022 2000 by Antoni Chiu RN  Skin Integrity Remains Intact:   Monitor for areas of redness and/or skin breakdown   Assess vascular access sites hourly  Taken 7/31/2022 1215 by Tracy Quintero RN  Skin Integrity Remains Intact: Monitor for areas of redness and/or skin breakdown  Goal: Oral mucous membranes remain intact  Outcome: Progressing  Flowsheets (Taken 7/31/2022 1215 by Tracy Quintero RN)  Oral Mucous Membranes Remain Intact: Assess oral mucosa and hygiene practices     Problem: Gastrointestinal - Adult  Goal: Minimal or absence of nausea and vomiting  Outcome: Progressing  Flowsheets (Taken 7/31/2022 2000)  Minimal or absence of nausea and vomiting:   Administer IV fluids as ordered to ensure adequate hydration   Maintain NPO status until nausea and vomiting are resolved  Goal: Maintains or returns to baseline bowel function  Outcome: Progressing  Flowsheets (Taken 7/31/2022 weight  Goal: Glucose maintained within prescribed range  Outcome: Progressing  Flowsheets (Taken 7/31/2022 2000)  Glucose maintained within prescribed range: Monitor blood glucose as ordered     Problem: Hematologic - Adult  Goal: Maintains hematologic stability  Outcome: Progressing  Flowsheets (Taken 7/31/2022 2000)  Maintains hematologic stability:   Assess for signs and symptoms of bleeding or hemorrhage   Monitor labs for bleeding or clotting disorders     Problem: ABCDS Injury Assessment  Goal: Absence of physical injury  Outcome: Progressing  Flowsheets (Taken 7/31/2022 2000)  Absence of Physical Injury: Implement safety measures based on patient assessment     Problem: Safety - Adult  Goal: Free from fall injury  Outcome: Progressing  Flowsheets (Taken 7/31/2022 2000)  Free From Fall Injury:   Instruct family/caregiver on patient safety   Based on caregiver fall risk screen, instruct family/caregiver to ask for assistance with transferring infant if caregiver noted to have fall risk factors     Problem: Skin/Tissue Integrity  Goal: Absence of new skin breakdown  Description: 1. Monitor for areas of redness and/or skin breakdown  2. Assess vascular access sites hourly  3. Every 4-6 hours minimum:  Change oxygen saturation probe site  4. Every 4-6 hours:  If on nasal continuous positive airway pressure, respiratory therapy assess nares and determine need for appliance change or resting period.   Outcome: Progressing     Flowsheets (Taken 7/31/2022 1215 by Fortino Lees RN)  Return ADL Status to a Safe Level of Function: Administer medication as ordered     Problem: Gastrointestinal - Adult  Goal: Maintains adequate nutritional intake  Outcome: Not Progressing

## 2022-08-01 NOTE — PLAN OF CARE
Problem: Respiratory - Adult  Goal: Achieves optimal ventilation and oxygenation  7/31/2022 2242 by Rock Mariia RCP  Outcome: Progressing

## 2022-08-01 NOTE — PLAN OF CARE
Problem: Discharge Planning  Goal: Discharge to home or other facility with appropriate resources  8/1/2022 0547 by Ry Smith RN  Outcome: Not Progressing  7/31/2022 2320 by Ry Smith RN  Outcome: Not Progressing  Flowsheets (Taken 7/31/2022 2000)  Discharge to home or other facility with appropriate resources: Identify barriers to discharge with patient and caregiver     Problem: Musculoskeletal - Adult  Goal: Return mobility to safest level of function  8/1/2022 0547 by Ry Smith RN  Outcome: Not Progressing  7/31/2022 2320 by Ry Smith RN  Outcome: Not Progressing  Flowsheets (Taken 7/31/2022 1215 by Merline Dias RN)  Return Mobility to Safest Level of Function: Assess patient stability and activity tolerance for standing, transferring and ambulating with or without assistive devices  Goal: Return ADL status to a safe level of function  8/1/2022 0547 by Ry Smith RN  Outcome: Not Progressing  7/31/2022 2320 by Ry Smith RN  Outcome: Not Progressing  Flowsheets (Taken 7/31/2022 1215 by Merline Dias RN)  Return ADL Status to a Safe Level of Function: Administer medication as ordered     Problem: Gastrointestinal - Adult  Goal: Maintains adequate nutritional intake  8/1/2022 0547 by Ry Smith RN  Outcome: Not Progressing  7/31/2022 2320 by Ry Smith RN  Outcome: Not Progressing     Problem: Safety - Medical Restraint  Goal: Remains free of injury from restraints (Restraint for Interference with Medical Device)  Description: INTERVENTIONS:  1. Determine that other, less restrictive measures have been tried or would not be effective before applying the restraint  2. Evaluate the patient's condition at the time of restraint application  3. Inform patient/family regarding the reason for restraint  4.  Q2H: Monitor safety, psychosocial status, comfort, nutrition and hydration  8/1/2022 0547 by Ry Smith RN  Outcome: Progressing  7/31/2022 2320 by Ry Smith 2320 by Sabiha Sharma RN  Outcome: Progressing  Flowsheets  Taken 7/31/2022 2000 by Sabiha Sharma RN  Verbalizes/displays adequate comfort level or baseline comfort level:   Encourage patient to monitor pain and request assistance   Assess pain using appropriate pain scale   Administer analgesics based on type and severity of pain and evaluate response   Implement non-pharmacological measures as appropriate and evaluate response   Consider cultural and social influences on pain and pain management   Notify Licensed Independent Practitioner if interventions unsuccessful or patient reports new pain  Taken 7/31/2022 1600 by Kaylee Benedict RN  Verbalizes/displays adequate comfort level or baseline comfort level: Encourage patient to monitor pain and request assistance  Taken 7/31/2022 1100 by Kaylee Benedict RN  Verbalizes/displays adequate comfort level or baseline comfort level: Encourage patient to monitor pain and request assistance     Problem: Neurosensory - Adult  Goal: Achieves stable or improved neurological status  8/1/2022 0547 by Sabiha Sharma RN  Outcome: Progressing  7/31/2022 2320 by Sabiha Sharma RN  Outcome: Progressing  Flowsheets (Taken 7/31/2022 1215 by Kaylee Benedict RN)  Achieves stable or improved neurological status: Assess for and report changes in neurological status  Goal: Absence of seizures  8/1/2022 0547 by Sabiha Sharma RN  Outcome: Progressing  7/31/2022 2320 by Sabiha Sharma RN  Outcome: Progressing  Flowsheets (Taken 7/31/2022 1215 by Kaylee Benedict RN)  Absence of seizures: Monitor for seizure activity.   If seizure occurs, document type and location of movements and any associated apnea     Problem: Respiratory - Adult  Goal: Achieves optimal ventilation and oxygenation  8/1/2022 0547 by Sabiha Sharma RN  Outcome: Progressing  7/31/2022 2320 by Sabiha Sharma RN  Outcome: Progressing  7/31/2022 2242 by David Barrett RCP  Outcome: Progressing  7/31/2022 1851 by Jeanne Alcaraz RCP  Outcome: Progressing  Flowsheets (Taken 7/31/2022 1215 by Juan Diamond RN)  Achieves optimal ventilation and oxygenation: Assess for changes in respiratory status     Problem: Cardiovascular - Adult  Goal: Maintains optimal cardiac output and hemodynamic stability  8/1/2022 0547 by Afua Cheatham RN  Outcome: Progressing  7/31/2022 2320 by Afua Cheatham RN  Outcome: Progressing  Flowsheets  Taken 7/31/2022 2000 by Afua Cheatham RN  Maintains optimal cardiac output and hemodynamic stability:   Monitor blood pressure and heart rate   Monitor urine output and notify Licensed Independent Practitioner for values outside of normal range  Taken 7/31/2022 1215 by Juan Diamond RN  Maintains optimal cardiac output and hemodynamic stability: Monitor blood pressure and heart rate  Goal: Absence of cardiac dysrhythmias or at baseline  8/1/2022 0547 by Afua Cheatham RN  Outcome: Progressing  7/31/2022 2320 by Afua Cheatham RN  Outcome: Progressing  Flowsheets  Taken 7/31/2022 2000 by Afua Cheatham RN  Absence of cardiac dysrhythmias or at baseline: Monitor cardiac rate and rhythm  Taken 7/31/2022 1215 by Juan Diamond RN  Absence of cardiac dysrhythmias or at baseline: Monitor cardiac rate and rhythm     Problem: Skin/Tissue Integrity - Adult  Goal: Skin integrity remains intact  8/1/2022 0547 by Afua Cheatham RN  Outcome: Progressing  Flowsheets (Taken 8/1/2022 0000)  Skin Integrity Remains Intact:   Monitor for areas of redness and/or skin breakdown   Assess vascular access sites hourly  7/31/2022 2320 by Afua Cheatham RN  Outcome: Progressing  Flowsheets  Taken 7/31/2022 2000 by Afua Cheatham RN  Skin Integrity Remains Intact:   Monitor for areas of redness and/or skin breakdown   Assess vascular access sites hourly  Taken 7/31/2022 1215 by Juan Diamond RN  Skin Integrity Remains Intact: Monitor for areas of redness and/or skin breakdown  Goal: Oral mucous membranes remain intact  8/1/2022 0547 by Osiris Rod RN  Outcome: Progressing  7/31/2022 2320 by Osiris Rod RN  Outcome: Progressing  Flowsheets (Taken 7/31/2022 1215 by Romana Fermin RN)  Oral Mucous Membranes Remain Intact: Assess oral mucosa and hygiene practices     Problem: Gastrointestinal - Adult  Goal: Minimal or absence of nausea and vomiting  8/1/2022 0547 by Osiris Rod RN  Outcome: Progressing  7/31/2022 2320 by Osiris Rod RN  Outcome: Progressing  Flowsheets (Taken 7/31/2022 2000)  Minimal or absence of nausea and vomiting:   Administer IV fluids as ordered to ensure adequate hydration   Maintain NPO status until nausea and vomiting are resolved  Goal: Maintains or returns to baseline bowel function  8/1/2022 0547 by Osiris Rod RN  Outcome: Progressing  7/31/2022 2320 by Osiris Rod RN  Outcome: Progressing  Flowsheets (Taken 7/31/2022 2000)  Maintains or returns to baseline bowel function: Assess bowel function     Problem: Genitourinary - Adult  Goal: Urinary catheter remains patent  8/1/2022 0547 by Osiris Rod RN  Outcome: Progressing  7/31/2022 2320 by Osiris Rod RN  Outcome: Progressing     Problem: Infection - Adult  Goal: Absence of infection at discharge  8/1/2022 0547 by Osiris Rod RN  Outcome: Progressing  7/31/2022 2320 by Osiris Rod RN  Outcome: Progressing  Flowsheets (Taken 7/31/2022 2000)  Absence of infection at discharge:   Assess and monitor for signs and symptoms of infection   Monitor lab/diagnostic results   Monitor all insertion sites i.e., indwelling lines, tubes and drains   Monitor endotracheal (as able) and nasal secretions for changes in amount and color  Goal: Absence of infection during hospitalization  8/1/2022 0547 by Osiris Rod RN  Outcome: Progressing  7/31/2022 2320 by Osiris Rod RN  Outcome: Progressing  Flowsheets (Taken 7/31/2022 2000)  Absence of infection during hospitalization:   Assess and monitor for signs and symptoms of infection   Monitor lab/diagnostic results   Monitor all insertion sites i.e., indwelling lines, tubes and drains   Monitor endotracheal (as able) and nasal secretions for changes in amount and color  Goal: Absence of fever/infection during anticipated neutropenic period  8/1/2022 0547 by Daniel Rodgers RN  Outcome: Progressing  7/31/2022 2320 by Daniel Rodgers RN  Outcome: Progressing  Flowsheets (Taken 7/31/2022 2000)  Absence of fever/infection during anticipated neutropenic period: Monitor white blood cell count     Problem: Metabolic/Fluid and Electrolytes - Adult  Goal: Electrolytes maintained within normal limits  8/1/2022 0547 by Daniel Rodgers RN  Outcome: Progressing  7/31/2022 2320 by Daniel Rodgers RN  Outcome: Progressing  Flowsheets (Taken 7/31/2022 2000)  Electrolytes maintained within normal limits:   Monitor labs and assess patient for signs and symptoms of electrolyte imbalances   Administer electrolyte replacement as ordered   Monitor response to electrolyte replacements, including repeat lab results as appropriate  Goal: Hemodynamic stability and optimal renal function maintained  8/1/2022 0547 by Daniel Rodgers RN  Outcome: Progressing  7/31/2022 2320 by Daniel Rodgers RN  Outcome: Progressing  Flowsheets (Taken 7/31/2022 2000)  Hemodynamic stability and optimal renal function maintained:   Monitor labs and assess for signs and symptoms of volume excess or deficit   Monitor intake, output and patient weight  Goal: Glucose maintained within prescribed range  8/1/2022 0547 by Daniel Rodgers RN  Outcome: Progressing  7/31/2022 2320 by Daniel Rodgers RN  Outcome: Progressing  Flowsheets (Taken 7/31/2022 2000)  Glucose maintained within prescribed range: Monitor blood glucose as ordered     Problem: Hematologic - Adult  Goal: Maintains hematologic stability  8/1/2022 0547 by Daniel Rodgers RN  Outcome: Progressing  7/31/2022 2320 by Daniel Rodgers RN  Outcome: Progressing  Flowsheets (Taken 7/31/2022 2000)  Maintains hematologic stability:   Assess for signs and symptoms of bleeding or hemorrhage   Monitor labs for bleeding or clotting disorders     Problem: ABCDS Injury Assessment  Goal: Absence of physical injury  8/1/2022 0547 by Sheba Brand RN  Outcome: Progressing  Flowsheets (Taken 8/1/2022 0000)  Absence of Physical Injury: Implement safety measures based on patient assessment  7/31/2022 2320 by Sheba Brand RN  Outcome: Progressing  Flowsheets (Taken 7/31/2022 2000)  Absence of Physical Injury: Implement safety measures based on patient assessment     Problem: Safety - Adult  Goal: Free from fall injury  8/1/2022 0547 by Sheba Brand RN  Outcome: Progressing  Flowsheets (Taken 8/1/2022 0000)  Free From Fall Injury:   Instruct family/caregiver on patient safety   Based on caregiver fall risk screen, instruct family/caregiver to ask for assistance with transferring infant if caregiver noted to have fall risk factors  7/31/2022 2320 by Sheba Brand RN  Outcome: Progressing  Flowsheets (Taken 7/31/2022 2000)  Free From Fall Injury:   Instruct family/caregiver on patient safety   Based on caregiver fall risk screen, instruct family/caregiver to ask for assistance with transferring infant if caregiver noted to have fall risk factors     Problem: Skin/Tissue Integrity  Goal: Absence of new skin breakdown  Description: 1. Monitor for areas of redness and/or skin breakdown  2. Assess vascular access sites hourly  3. Every 4-6 hours minimum:  Change oxygen saturation probe site  4. Every 4-6 hours:  If on nasal continuous positive airway pressure, respiratory therapy assess nares and determine need for appliance change or resting period.   8/1/2022 0547 by Sheba Brand RN  Outcome: Progressing  7/31/2022 2320 by Sheba Brand RN  Outcome: Progressing     Problem: Discharge Planning  Goal: Discharge to home or other facility with appropriate resources  8/1/2022 0547 by Sheba Brand RN  Outcome: Not Progressing  7/31/2022 2320 by Daniel Rodgers RN  Outcome: Not Progressing  Flowsheets (Taken 7/31/2022 2000)  Discharge to home or other facility with appropriate resources: Identify barriers to discharge with patient and caregiver     Problem: Musculoskeletal - Adult  Goal: Return mobility to safest level of function  8/1/2022 0547 by Daniel Rodgers RN  Outcome: Not Progressing  7/31/2022 2320 by Daniel Rodgers RN  Outcome: Not Progressing  Flowsheets (Taken 7/31/2022 1215 by Ana Gama RN)  Return Mobility to Safest Level of Function: Assess patient stability and activity tolerance for standing, transferring and ambulating with or without assistive devices  Goal: Return ADL status to a safe level of function  8/1/2022 0547 by Daniel Rodgers RN  Outcome: Not Progressing  7/31/2022 2320 by Daniel Rodgers RN  Outcome: Not Progressing  Flowsheets (Taken 7/31/2022 1215 by Ana Gama RN)  Return ADL Status to a Safe Level of Function: Administer medication as ordered     Problem: Gastrointestinal - Adult  Goal: Maintains adequate nutritional intake  8/1/2022 0547 by Daniel Rodgers RN  Outcome: Not Progressing  7/31/2022 2320 by Daniel Rodgers RN  Outcome: Not Progressing

## 2022-08-01 NOTE — PROGRESS NOTES
[address] [facility]   Department of Internal Medicine   MICU Progress Note    Patient:  Álvaro Beau 49 y.o. male   MRN: [id number]       Date of Service: 2022    Allergy: Patient has no known allergies. Subjective     22  Intubated, sedated on the vent (Vent Day 1)  No seizure activity overnight  VSS, propofol stopped for hypotension, Versed started. Febrile overnight, max temp 100.8  Possible MRI today    22  Overnight has periods of desaturations with long recovery. Intubated and sedated. Precedex gtt started. Mechanical ventilation day 2. Enhanced droplet precautions. Repeat blood cultures       Objective     TEMPERATURE:  Current - Temp: 98.6 °F (37 °C); Max - Temp  Av.7 °F (37.1 °C)  Min: 97.8 °F (36.6 °C)  Max: 99.7 °F (37.6 °C)    RESPIRATIONS RANGE: Resp  Av.7  Min: 14  Max: 31    PULSE RANGE: Pulse  Av.9  Min: 64  Max: 92    BLOOD PRESSURE RANGE:  Systolic (53ZNZ), OAL:199 , Min:108 , FCF:902   ; Diastolic (86RKQ), ZNL:00, Min:64, Max:82      PULSE OXIMETRY RANGE: SpO2  Av.7 %  Min: 91 %  Max: 100 %    I & O - 24hr:    Intake/Output Summary (Last 24 hours) at 2022 1336  Last data filed at 2022 1300  Gross per 24 hour   Intake 2581.92 ml   Output 1975 ml   Net 606.92 ml     I/O last 3 completed shifts: In: 4651.2 [I.V.:4081.8; NG/GT:120; IV Piggyback:449.4]  Out: 2325 [Urine:2325] I/O this shift:  In: 100 [NG/GT:100]  Out: 825 [Urine:825]   Weight change: -23 lb 1.7 oz (-10.5 kg)    Physical Exam:  General Appearance: Intubated, sedated, follows commands  HEENT:  ETT to vent, PERRLA, minimal secretions, dry mucous membranes  Lung: Lung sounds diminished bilaterally, scattered rhonchi  Heart: S1/S2, RRR, pulses palpable, no edema  Abdomen: soft, non-tender, hypoactive bowel sounds  Extremities:  No edema, following commands to thumbs up and wiggles toes. Musculokeletal: No joint swelling, no muscle tenderness.  ROM normal in all joints of extremities. Neurologic: Mental status: intubated, sedated. Follows commands.       Medications     Continuous Infusions:   dexmedetomidine (PRECEDEX) IV infusion 0.6 mcg/kg/hr (08/01/22 1232)    midazolam Stopped (08/01/22 1232)    sodium chloride 75 mL/hr at 08/01/22 0600    fentaNYL 10 mcg/ml in 0.9%  ml infusion 150 mcg/hr (08/01/22 6418)    sodium chloride Stopped (08/01/22 0445)     Scheduled Meds:   [START ON 8/2/2022] baricitinib  4 mg Oral Daily    vancomycin  1,000 mg IntraVENous Q12H    levETIRAcetam  500 mg IntraVENous Q12H    budesonide  0.5 mg Nebulization BID    Arformoterol Tartrate  15 mcg Nebulization BID    ipratropium-albuterol  1 ampule Inhalation Q4H    nicotine  1 patch TransDERmal Daily    chlorhexidine  15 mL Mouth/Throat BID    sodium chloride flush  10 mL IntraVENous 2 times per day    enoxaparin  40 mg SubCUTAneous Daily    piperacillin-tazobactam  4,500 mg IntraVENous Q8H    sodium chloride flush  5-40 mL IntraVENous 2 times per day    pantoprazole (PROTONIX) 40 mg injection  40 mg IntraVENous Daily    dexamethasone  20 mg IntraVENous Q24H    Followed by    Yakutat Sacks ON 8/5/2022] dexamethasone  10 mg IntraVENous Q24H    vitamin D  2,000 Units Oral Daily    ascorbic acid  500 mg Oral Daily    zinc sulfate  50 mg Oral Daily     PRN Meds: midazolam, fentanNYL, sodium chloride, promethazine **OR** ondansetron, polyethylene glycol, sodium chloride flush, [DISCONTINUED] acetaminophen **OR** acetaminophen  Nutrition:   NG/OG tube: NPO    Labs and Imaging Studies     CBC:   Recent Labs     07/30/22 2230 07/31/22 0527 08/01/22  0435   WBC 8.0 9.0 10.4   RBC 4.75 4.60 4.18   HGB 13.8 13.0 12.1*   HCT 41.6 39.7 36.2*   MCV 87.6 86.3 86.6   MCH 29.1 28.3 28.9   MCHC 33.2 32.7 33.4   RDW 13.4 13.7 13.2    157 146   MPV 10.7 10.4 10.5       BMP:    Recent Labs     07/30/22 2230 07/31/22  0527 08/01/22  0435    138 136   K 4.2 4.4 4.4    108* 105   CO2 23 21* 24   BUN 9 11 15 CREATININE 0.8 0.9 0.8   GLUCOSE 99 127* 95   CALCIUM 7.9* 8.1* 8.1*   PROT 6.5 6.5 5.8*   LABALBU 3.5 3.6 3.0*   BILITOT 0.3 0.3 <0.2   ALKPHOS 104 97 74   AST 34 22 18   ALT 39 34 22       LIVER PROFILE:   Recent Labs     07/30/22  2230 07/31/22  0527 08/01/22  0435   AST 34 22 18   ALT 39 34 22   BILITOT 0.3 0.3 <0.2   ALKPHOS 104 97 74       PT/INR:   Recent Labs     07/30/22  1725 07/31/22  0527   PROTIME 14.3* 15.8*   INR 1.3 1.4       APTT:   Recent Labs     07/30/22  1725 07/31/22 0527   APTT 32.4 32.6       Fasting Lipid Panel:    No results found for: CHOL, TRIG, HDL    Cardiac Enzymes:    No results found for: CKTOTAL, CKMB, CKMBINDEX, TROPONINI    Notable Cultures:      Blood cultures   Blood Culture, Routine   Date Value Ref Range Status   07/30/2022 (A)  Preliminary    Gram stain performed from blood culture bottle media  Gram positive cocci in clusters       Respiratory cultures No results found for: RESPCULTURE No results found for: LABGRAM  Urine   Urine Culture, Routine   Date Value Ref Range Status   07/30/2022 Growth not present, incubation continues  Preliminary     Legionella No results found for: LABLEGI  C Diff PCR No results found for: CDIFPCR  Wound culture/abscess: No results for input(s): WNDABS in the last 72 hours. Tip culture:No results for input(s): CXCATHTIP in the last 72 hours.      Antibiotic  Days  Day started   Zosyn 3 7/30/22                           Oxygen:     Vent Information  Ventilator ID: [id number]    Additional Respiratory Assessments  Heart Rate: 74  Resp: 16  SpO2: 95 %  Position: Semi-Sosa's  Humidification Source: Heated wire  Humidification Temp: 36.4  Circuit Condensation: Drained       ABG     PH  7.46   PCO2  33   PO2  169   HCO3  22.3   Sat%  98.2   FIO2  40%   DATES 08/01/22       Lines:  Site  Day  Date inserted     TLC              PICC              Arterial line              Peripheral line   R FA/ R Wrist 2   7/30/22     HD cath            Urinary Catheter-Output (mL): 175 mL    Imaging Studies:    XR CHEST PORTABLE   Final Result   Improving aeration of right lower lobe         XR SHOULDER RIGHT (MIN 2 VIEWS)   Final Result   Ill-defined opacification seen within the right lung base. No evidence of   fracture or dislocation. Degenerative changes seen within the shoulder. XR HIP LEFT (2-3 VIEWS)   Final Result   Degenerative changes in the left hip with no evidence of acute bony   abnormality         XR CHEST PORTABLE   Final Result   Development of some mild increased markings at the left lung base with no   change in the airspace disease at the right lung base. Lines and tubes are   stable. XR ABDOMEN FOR NG/OG/NE TUBE PLACEMENT   Final Result   Adequately positioned nasogastric tube. XR CHEST PORTABLE   Final Result   Atelectasis/infiltrates in the right lower lobe and right middle lobe   concerning for pneumonia. CT HEAD WO CONTRAST   Final Result   No acute intracranial abnormality. Soft tissue density completely filling the nasal cavity and nasopharynx   probably inflammatory/secretions. Malignancy has to be excluded and direct   visualization recommended. CTA chest.      Comparison September 4, 2014      Findings      The heart and the great vessels are normal.  Nonenlarged mediastinal and   hilar lymph nodes are present. Trachea and major bronchi are patent. There   are no filling defects in the main pulmonary artery and the central branches. Says mucous plugging is identified in the lower lobe bronchi bilaterally. There is atelectasis/infiltrates in the right lower lobe, right middle lobe   and to a lesser extent in the left lower lobe. There is no pleural effusion. Impression      There is no central pulmonary embolism or aortic dissection.       Mucous plugging in the lower lobes bilaterally with atelectasis/infiltrates   in the lower lobes more on the right side and right middle lobe concerning   for pneumonia. CT abdomen pelvis. The liver is of normal architecture. Gallbladder is partially distended. Spleen, pancreas, and the adrenals are normal.  There is heterogeneous   enhancement of the kidneys with the areas of decreased attenuation concerning   for pyelonephritis. Dilated fluid-filled small bowel loops are noted likely   ileus/enteritis with small bowel loops measuring up to 2 cm. Pelvis. Bladder is partially distended with the Hampton catheter and the wall   thickening. Colon is normal.  The appendix is partially identified and   appears normal.      Impression      Heterogeneous enhancement of the kidneys concerning for multifocal   pyelonephritis. There is also thickening of the urinary bladder. Please   correlate with urinalysis. Dilated fluid-filled small bowel loops likely ileus/enteritis. CTA PULMONARY W CONTRAST   Final Result   No acute intracranial abnormality. Soft tissue density completely filling the nasal cavity and nasopharynx   probably inflammatory/secretions. Malignancy has to be excluded and direct   visualization recommended. CTA chest.      Comparison September 4, 2014      Findings      The heart and the great vessels are normal.  Nonenlarged mediastinal and   hilar lymph nodes are present. Trachea and major bronchi are patent. There   are no filling defects in the main pulmonary artery and the central branches. Says mucous plugging is identified in the lower lobe bronchi bilaterally. There is atelectasis/infiltrates in the right lower lobe, right middle lobe   and to a lesser extent in the left lower lobe. There is no pleural effusion. Impression      There is no central pulmonary embolism or aortic dissection. Mucous plugging in the lower lobes bilaterally with atelectasis/infiltrates   in the lower lobes more on the right side and right middle lobe concerning   for pneumonia. CT abdomen pelvis. The liver is of normal architecture. Gallbladder is partially distended. Spleen, pancreas, and the adrenals are normal.  There is heterogeneous   enhancement of the kidneys with the areas of decreased attenuation concerning   for pyelonephritis. Dilated fluid-filled small bowel loops are noted likely   ileus/enteritis with small bowel loops measuring up to 2 cm. Pelvis. Bladder is partially distended with the Hampton catheter and the wall   thickening. Colon is normal.  The appendix is partially identified and   appears normal.      Impression      Heterogeneous enhancement of the kidneys concerning for multifocal   pyelonephritis. There is also thickening of the urinary bladder. Please   correlate with urinalysis. Dilated fluid-filled small bowel loops likely ileus/enteritis. CT ABDOMEN PELVIS W IV CONTRAST Additional Contrast? None   Final Result   No acute intracranial abnormality. Soft tissue density completely filling the nasal cavity and nasopharynx   probably inflammatory/secretions. Malignancy has to be excluded and direct   visualization recommended. CTA chest.      Comparison September 4, 2014      Findings      The heart and the great vessels are normal.  Nonenlarged mediastinal and   hilar lymph nodes are present. Trachea and major bronchi are patent. There   are no filling defects in the main pulmonary artery and the central branches. Says mucous plugging is identified in the lower lobe bronchi bilaterally. There is atelectasis/infiltrates in the right lower lobe, right middle lobe   and to a lesser extent in the left lower lobe. There is no pleural effusion. Impression      There is no central pulmonary embolism or aortic dissection. Mucous plugging in the lower lobes bilaterally with atelectasis/infiltrates   in the lower lobes more on the right side and right middle lobe concerning   for pneumonia. CT abdomen pelvis.       The liver is of normal architecture. Gallbladder is partially distended. Spleen, pancreas, and the adrenals are normal.  There is heterogeneous   enhancement of the kidneys with the areas of decreased attenuation concerning   for pyelonephritis. Dilated fluid-filled small bowel loops are noted likely   ileus/enteritis with small bowel loops measuring up to 2 cm. Pelvis. Bladder is partially distended with the Hampton catheter and the wall   thickening. Colon is normal.  The appendix is partially identified and   appears normal.      Impression      Heterogeneous enhancement of the kidneys concerning for multifocal   pyelonephritis. There is also thickening of the urinary bladder. Please   correlate with urinalysis. Dilated fluid-filled small bowel loops likely ileus/enteritis. MRI BRAIN WO CONTRAST    (Results Pending)   XR CHEST PORTABLE    (Results Pending)        EKG: Rhythm Strip: normal sinus rhythm, unchanged from previous tracings. APRN- CNP Assessment and PLan   Assessment and Plan:  Acute Metabolic Encephalopathy,   Seizure disorder, new onset  -AMS in the ED, possible seizure activity, CT no acute process  -Continue Levetiracetam   -Neurology consulted, appreciate input.  - EEG no seizure epiltiform    - Awaiting MRI    -Patient is following commands   - Precedex gtt. -PRN versed for seizure activity no seizure activity nont    -Continue seizure precautions    Acute hypoxic respiratory failure, 2/2 COVID-19 vs. PNA  COVID-19  Barcitinib 4 mg x 14 doses. Hx of Tobacco use  Hx of Marijuana use   -Procalcitonin 0.06, Lactic acid 0.8, CRP 0.3   -Respiratory culture - moderate mixed bacterial morphotype on gram stain.    -CXR daily, CTA negative for PE, atelectatsis/infiltrates in the lower lobes more on the right side and right middle lobe concerns for PNA  -Continue Duonebs, brovana, pulmicort  -Continue Decadron  -Continue zinc, Vit D and ascorbic acid   -Chest physiotherapy  -SAT and SBT,daily.   -Wean FiO2 and keep pulse ox >92%  -Continue Zosyn  -Daily CXR  -Start nicotine patch    Ileus/Enteritis per CT   -Patient currently NPO   - Tube feedings    - Dietary consulted for tube feedings. -General Surgery consulted, appreciate input   -On PPI    UTI/Concerns for pyelonephritis   -UA shows trace leukocyte esterase with rare bacteria   -Zosyn day 3, continued   - Vancomycin added Pharmacy to dose. -Urine culture no growth. -Strep and legionella negative   -Blood cultures GPC in clusters repeat cultures sent. Hx of right shoulder supraspinatus tear-recently presented to the ED on 7/6/22 for right should pain   -Will consult orthopedics to follow up while inpatient. -MRI from 2019 shows full thickness tear    Hx of Bimalleolar fracture of left ankle s/p internal fixation (2014)    Electrolytes/Fluids/Nutrition: Replace as needed, NS @ 75cc/hour, NPO tube feeding to start. Plan of care discussed in multidisciplinary rounds. Dr. Asia Friedman is the collaborating physician. Ramez Bai, [facility].

## 2022-08-01 NOTE — PLAN OF CARE
Patient EEG showed moderate encephalopathy  MRI brain is pending     Neurology will follow up once MRI brain is completed

## 2022-08-01 NOTE — PROGRESS NOTES
Pharmacy Consultation Note  (Antibiotic Dosing and Monitoring)    Initial consult date: 8/1/22  Consulting physician/provider: Sotero MISHRA  Drug: Vancomycin  Indication: Bloodstream infection    Age/  Gender Height Weight IBW  Allergy Information   47 y.o./male 6' 2\" (188 cm) 175 lb (79.4 kg)     Ideal body weight: 82.2 kg (181 lb 3.5 oz)   Patient has no known allergies. Renal Function:  Recent Labs     07/30/22  2230 07/31/22  0527 08/01/22  0435   BUN 9 11 15   CREATININE 0.8 0.9 0.8       Intake/Output Summary (Last 24 hours) at 8/1/2022 1258  Last data filed at 8/1/2022 1200  Gross per 24 hour   Intake 2581.92 ml   Output 1860 ml   Net 721.92 ml       Vancomycin Monitoring:  Trough:  No results for input(s): VANCOTROUGH in the last 72 hours. Random:  No results for input(s): VANCORANDOM in the last 72 hours. Vancomycin Administration Times:  Recent vancomycin administrations        No vancomycin IV orders with administrations found. Orders not given:            vancomycin (VANCOCIN) 1,000 mg in dextrose 5 % 250 mL IVPB ([id number])                    Assessment:  Patient is a 49 y.o. male who has been initiated on vancomycin  Estimated Creatinine Clearance: 110 mL/min (based on SCr of 0.8 mg/dL). To dose vancomycin, pharmacy will be utilizing [employer] calculation software for goal AUC/DILEEP 400-600 mg/L-hr    Plan:   Will start vancomycin 1000 mg IV every 12 hours  Will check vancomycin levels when appropriate  Will continue to monitor renal function   Clinical pharmacy to follow      Jose LaureaonLeia, [address] [place] [address] 8/1/2022 12:58 PM

## 2022-08-01 NOTE — PLAN OF CARE
Problem: Safety - Medical Restraint  Goal: Remains free of injury from restraints (Restraint for Interference with Medical Device)  Description: INTERVENTIONS:  1. Determine that other, less restrictive measures have been tried or would not be effective before applying the restraint  2. Evaluate the patient's condition at the time of restraint application  3. Inform patient/family regarding the reason for restraint  4. Q2H: Monitor safety, psychosocial status, comfort, nutrition and hydration  8/1/2022 1617 by Guillermo Hernandez RN  Outcome: Progressing     Patient attempts to reach for ETT. Bilateral wrist restraints remain in place for pt safety.

## 2022-08-02 ENCOUNTER — APPOINTMENT (OUTPATIENT)
Dept: GENERAL RADIOLOGY | Age: 47
DRG: 137 | End: 2022-08-02
Payer: MEDICAID

## 2022-08-02 LAB
AADO2: 53.9 MMHG
ALBUMIN SERPL-MCNC: 3 G/DL (ref 3.5–5.2)
ALP BLD-CCNC: 76 U/L (ref 40–129)
ALT SERPL-CCNC: 18 U/L (ref 0–40)
ANION GAP SERPL CALCULATED.3IONS-SCNC: 7 MMOL/L (ref 7–16)
AST SERPL-CCNC: 16 U/L (ref 0–39)
B.E.: 1.8 MMOL/L (ref -3–3)
BASOPHILS ABSOLUTE: 0.01 E9/L (ref 0–0.2)
BASOPHILS RELATIVE PERCENT: 0.1 % (ref 0–2)
BILIRUB SERPL-MCNC: 0.2 MG/DL (ref 0–1.2)
BLOOD CULTURE, ROUTINE: ABNORMAL
BUN BLDV-MCNC: 12 MG/DL (ref 6–20)
CALCIUM IONIZED: 1.27 MMOL/L (ref 1.15–1.33)
CALCIUM SERPL-MCNC: 8.2 MG/DL (ref 8.6–10.2)
CHLORIDE BLD-SCNC: 104 MMOL/L (ref 98–107)
CO2: 26 MMOL/L (ref 22–29)
COHB: 0.4 % (ref 0–1.5)
CREAT SERPL-MCNC: 0.8 MG/DL (ref 0.7–1.2)
CRITICAL: ABNORMAL
CULTURE, RESPIRATORY: NORMAL
DATE ANALYZED: ABNORMAL
DATE OF COLLECTION: ABNORMAL
EOSINOPHILS ABSOLUTE: 0 E9/L (ref 0.05–0.5)
EOSINOPHILS RELATIVE PERCENT: 0 % (ref 0–6)
FIO2: 40 %
GFR AFRICAN AMERICAN: >60
GFR NON-AFRICAN AMERICAN: >60 ML/MIN/1.73
GLUCOSE BLD-MCNC: 119 MG/DL (ref 74–99)
HCO3: 26.4 MMOL/L (ref 22–26)
HCT VFR BLD CALC: 36.6 % (ref 37–54)
HEMOGLOBIN: 12.2 G/DL (ref 12.5–16.5)
HHB: 0.9 % (ref 0–5)
IMMATURE GRANULOCYTES #: 0.03 E9/L
IMMATURE GRANULOCYTES %: 0.4 % (ref 0–5)
LAB: ABNORMAL
LYMPHOCYTES ABSOLUTE: 0.87 E9/L (ref 1.5–4)
LYMPHOCYTES RELATIVE PERCENT: 11.6 % (ref 20–42)
Lab: ABNORMAL
MAGNESIUM: 2 MG/DL (ref 1.6–2.6)
MCH RBC QN AUTO: 28.7 PG (ref 26–35)
MCHC RBC AUTO-ENTMCNC: 33.3 % (ref 32–34.5)
MCV RBC AUTO: 86.1 FL (ref 80–99.9)
METHB: 0.2 % (ref 0–1.5)
MODE: AC
MONOCYTES ABSOLUTE: 0.35 E9/L (ref 0.1–0.95)
MONOCYTES RELATIVE PERCENT: 4.7 % (ref 2–12)
NEUTROPHILS ABSOLUTE: 6.24 E9/L (ref 1.8–7.3)
NEUTROPHILS RELATIVE PERCENT: 83.2 % (ref 43–80)
O2 SATURATION: 99.2 % (ref 92–98.5)
O2HB: 98.5 % (ref 94–97)
OPERATOR ID: 1721
ORGANISM: ABNORMAL
PATIENT TEMP: 37 C
PCO2: 41.1 MMHG (ref 35–45)
PDW BLD-RTO: 13 FL (ref 11.5–15)
PEEP/CPAP: 8 CMH2O
PFO2: 4.35 MMHG/%
PH BLOOD GAS: 7.42 (ref 7.35–7.45)
PHOSPHORUS: 2.8 MG/DL (ref 2.5–4.5)
PLATELET # BLD: 136 E9/L (ref 130–450)
PMV BLD AUTO: 10.9 FL (ref 7–12)
PO2: 174 MMHG (ref 75–100)
POTASSIUM SERPL-SCNC: 4.3 MMOL/L (ref 3.5–5)
RBC # BLD: 4.25 E12/L (ref 3.8–5.8)
RI(T): 0.31
RR MECHANICAL: 14 B/MIN
SMEAR, RESPIRATORY: NORMAL
SODIUM BLD-SCNC: 137 MMOL/L (ref 132–146)
SOURCE, BLOOD GAS: ABNORMAL
THB: 13.3 G/DL (ref 11.5–16.5)
TIME ANALYZED: 544
TOTAL PROTEIN: 6.1 G/DL (ref 6.4–8.3)
URINE CULTURE, ROUTINE: NORMAL
VT MECHANICAL: 400 ML
WBC # BLD: 7.5 E9/L (ref 4.5–11.5)

## 2022-08-02 PROCEDURE — 94003 VENT MGMT INPAT SUBQ DAY: CPT

## 2022-08-02 PROCEDURE — 80053 COMPREHEN METABOLIC PANEL: CPT

## 2022-08-02 PROCEDURE — A4216 STERILE WATER/SALINE, 10 ML: HCPCS | Performed by: NURSE PRACTITIONER

## 2022-08-02 PROCEDURE — 6360000002 HC RX W HCPCS: Performed by: NURSE PRACTITIONER

## 2022-08-02 PROCEDURE — 2580000003 HC RX 258: Performed by: NURSE PRACTITIONER

## 2022-08-02 PROCEDURE — 85025 COMPLETE CBC W/AUTO DIFF WBC: CPT

## 2022-08-02 PROCEDURE — 6370000000 HC RX 637 (ALT 250 FOR IP): Performed by: NURSE PRACTITIONER

## 2022-08-02 PROCEDURE — 84100 ASSAY OF PHOSPHORUS: CPT

## 2022-08-02 PROCEDURE — 2500000003 HC RX 250 WO HCPCS: Performed by: FAMILY MEDICINE

## 2022-08-02 PROCEDURE — 6360000002 HC RX W HCPCS: Performed by: FAMILY MEDICINE

## 2022-08-02 PROCEDURE — 83735 ASSAY OF MAGNESIUM: CPT

## 2022-08-02 PROCEDURE — 2700000000 HC OXYGEN THERAPY PER DAY

## 2022-08-02 PROCEDURE — 94667 MNPJ CHEST WALL 1ST: CPT

## 2022-08-02 PROCEDURE — 99291 CRITICAL CARE FIRST HOUR: CPT | Performed by: INTERNAL MEDICINE

## 2022-08-02 PROCEDURE — 6370000000 HC RX 637 (ALT 250 FOR IP)

## 2022-08-02 PROCEDURE — 82805 BLOOD GASES W/O2 SATURATION: CPT

## 2022-08-02 PROCEDURE — 6360000002 HC RX W HCPCS

## 2022-08-02 PROCEDURE — 71045 X-RAY EXAM CHEST 1 VIEW: CPT

## 2022-08-02 PROCEDURE — 2580000003 HC RX 258: Performed by: FAMILY MEDICINE

## 2022-08-02 PROCEDURE — 82330 ASSAY OF CALCIUM: CPT

## 2022-08-02 PROCEDURE — 2000000000 HC ICU R&B

## 2022-08-02 PROCEDURE — 94640 AIRWAY INHALATION TREATMENT: CPT

## 2022-08-02 PROCEDURE — C9113 INJ PANTOPRAZOLE SODIUM, VIA: HCPCS | Performed by: NURSE PRACTITIONER

## 2022-08-02 RX ORDER — IPRATROPIUM BROMIDE AND ALBUTEROL SULFATE 2.5; .5 MG/3ML; MG/3ML
1 SOLUTION RESPIRATORY (INHALATION) EVERY 4 HOURS PRN
Status: DISCONTINUED | OUTPATIENT
Start: 2022-08-02 | End: 2022-08-04 | Stop reason: HOSPADM

## 2022-08-02 RX ORDER — PANTOPRAZOLE SODIUM 40 MG/1
40 TABLET, DELAYED RELEASE ORAL
Status: DISCONTINUED | OUTPATIENT
Start: 2022-08-03 | End: 2022-08-04 | Stop reason: HOSPADM

## 2022-08-02 RX ORDER — HYDRALAZINE HYDROCHLORIDE 20 MG/ML
10 INJECTION INTRAMUSCULAR; INTRAVENOUS EVERY 6 HOURS PRN
Status: DISCONTINUED | OUTPATIENT
Start: 2022-08-02 | End: 2022-08-04 | Stop reason: HOSPADM

## 2022-08-02 RX ADMIN — HYDRALAZINE HYDROCHLORIDE 10 MG: 20 INJECTION INTRAMUSCULAR; INTRAVENOUS at 12:25

## 2022-08-02 RX ADMIN — IPRATROPIUM BROMIDE AND ALBUTEROL SULFATE 1 AMPULE: 2.5; .5 SOLUTION RESPIRATORY (INHALATION) at 08:30

## 2022-08-02 RX ADMIN — VANCOMYCIN HYDROCHLORIDE 1000 MG: 1 INJECTION, POWDER, LYOPHILIZED, FOR SOLUTION INTRAVENOUS at 01:40

## 2022-08-02 RX ADMIN — BUDESONIDE 500 MCG: 0.5 SUSPENSION RESPIRATORY (INHALATION) at 08:30

## 2022-08-02 RX ADMIN — BUDESONIDE 500 MCG: 0.5 SUSPENSION RESPIRATORY (INHALATION) at 20:11

## 2022-08-02 RX ADMIN — DEXAMETHASONE SODIUM PHOSPHATE 20 MG: 4 INJECTION, SOLUTION INTRAMUSCULAR; INTRAVENOUS at 11:07

## 2022-08-02 RX ADMIN — IPRATROPIUM BROMIDE AND ALBUTEROL SULFATE 1 AMPULE: 2.5; .5 SOLUTION RESPIRATORY (INHALATION) at 11:40

## 2022-08-02 RX ADMIN — BARICITINIB 4 MG: 2 TABLET, FILM COATED ORAL at 11:05

## 2022-08-02 RX ADMIN — Medication 150 MCG/HR: at 05:40

## 2022-08-02 RX ADMIN — IPRATROPIUM BROMIDE AND ALBUTEROL SULFATE 1 AMPULE: 2.5; .5 SOLUTION RESPIRATORY (INHALATION) at 15:35

## 2022-08-02 RX ADMIN — PIPERACILLIN AND TAZOBACTAM 4500 MG: 4; .5 INJECTION, POWDER, FOR SOLUTION INTRAVENOUS at 19:57

## 2022-08-02 RX ADMIN — Medication 2000 UNITS: at 08:27

## 2022-08-02 RX ADMIN — SODIUM CHLORIDE, PRESERVATIVE FREE 10 ML: 5 INJECTION INTRAVENOUS at 19:56

## 2022-08-02 RX ADMIN — OXYCODONE HYDROCHLORIDE AND ACETAMINOPHEN 500 MG: 500 TABLET ORAL at 08:27

## 2022-08-02 RX ADMIN — ENOXAPARIN SODIUM 40 MG: 100 INJECTION SUBCUTANEOUS at 08:28

## 2022-08-02 RX ADMIN — ARFORMOTEROL TARTRATE 15 MCG: 15 SOLUTION RESPIRATORY (INHALATION) at 20:11

## 2022-08-02 RX ADMIN — SODIUM CHLORIDE: 9 INJECTION, SOLUTION INTRAVENOUS at 12:26

## 2022-08-02 RX ADMIN — Medication 50 MG: at 08:27

## 2022-08-02 RX ADMIN — LEVETIRACETAM 500 MG: 5 INJECTION INTRAVENOUS at 18:21

## 2022-08-02 RX ADMIN — PIPERACILLIN AND TAZOBACTAM 4500 MG: 4; .5 INJECTION, POWDER, FOR SOLUTION INTRAVENOUS at 12:27

## 2022-08-02 RX ADMIN — 0.12% CHLORHEXIDINE GLUCONATE 15 ML: 1.2 RINSE ORAL at 08:27

## 2022-08-02 RX ADMIN — PIPERACILLIN AND TAZOBACTAM 4500 MG: 4; .5 INJECTION, POWDER, FOR SOLUTION INTRAVENOUS at 03:12

## 2022-08-02 RX ADMIN — LEVETIRACETAM 500 MG: 5 INJECTION INTRAVENOUS at 05:33

## 2022-08-02 RX ADMIN — IPRATROPIUM BROMIDE AND ALBUTEROL SULFATE 1 AMPULE: 2.5; .5 SOLUTION RESPIRATORY (INHALATION) at 00:12

## 2022-08-02 RX ADMIN — ONDANSETRON HYDROCHLORIDE 4 MG: 2 SOLUTION INTRAMUSCULAR; INTRAVENOUS at 23:06

## 2022-08-02 RX ADMIN — DOCUSATE SODIUM LIQUID 100 MG: 100 LIQUID ORAL at 08:27

## 2022-08-02 RX ADMIN — SODIUM CHLORIDE, PRESERVATIVE FREE 10 ML: 5 INJECTION INTRAVENOUS at 19:57

## 2022-08-02 RX ADMIN — SODIUM CHLORIDE 40 MG: 9 INJECTION INTRAMUSCULAR; INTRAVENOUS; SUBCUTANEOUS at 08:27

## 2022-08-02 RX ADMIN — ARFORMOTEROL TARTRATE 15 MCG: 15 SOLUTION RESPIRATORY (INHALATION) at 08:30

## 2022-08-02 RX ADMIN — IPRATROPIUM BROMIDE AND ALBUTEROL SULFATE 1 AMPULE: 2.5; .5 SOLUTION RESPIRATORY (INHALATION) at 03:51

## 2022-08-02 ASSESSMENT — PULMONARY FUNCTION TESTS
PIF_VALUE: 18
PIF_VALUE: 19
PIF_VALUE: 15
PIF_VALUE: 16
PIF_VALUE: 18
PIF_VALUE: 20
PIF_VALUE: 16
PIF_VALUE: 18
PIF_VALUE: 19
PIF_VALUE: 21
PIF_VALUE: 19
PIF_VALUE: 18
PIF_VALUE: 19
PIF_VALUE: 18
PIF_VALUE: 19
PIF_VALUE: 19
PIF_VALUE: 17
PIF_VALUE: 20
PIF_VALUE: 16

## 2022-08-02 ASSESSMENT — PAIN SCALES - GENERAL
PAINLEVEL_OUTOF10: 0
PAINLEVEL_OUTOF10: 0

## 2022-08-02 NOTE — PROGRESS NOTES
[address] [facility]   Department of Internal Medicine   MICU Progress Note    Patient:  Phoenix Jenkins 49 y.o. male   MRN: [id number]       Date of Service: 2022    Allergy: Patient has no known allergies. Subjective     22  Intubated, sedated on the vent (Vent Day 1)  No seizure activity overnight  VSS, propofol stopped for hypotension, Versed started. Febrile overnight, max temp 100.8  Possible MRI today    22  Overnight has periods of desaturations with long recovery. Intubated and sedated. Precedex gtt started. Mechanical ventilation day 2. Enhanced droplet precautions. Repeat blood cultures     22  No episodes of desaturation overnight. Remains intubated on Precedex and Fentanyl gtt  Awake following commands. VSS afebrile. No pressor requirements. MRI last night No acute intracranial abnormality. Objective     TEMPERATURE:  Current - Temp: 97.9 °F (36.6 °C); Max - Temp  Av.4 °F (36.9 °C)  Min: 97.9 °F (36.6 °C)  Max: 98.8 °F (37.1 °C)    RESPIRATIONS RANGE: Resp  Avg: 15.3  Min: 12  Max: 22    PULSE RANGE: Pulse  Av.1  Min: 53  Max: 74    BLOOD PRESSURE RANGE:  Systolic (01JRA), IYW:824 , Min:118 , HPY:546   ; Diastolic (95KZW), GIE:48, Min:72, Max:98      PULSE OXIMETRY RANGE: SpO2  Av.3 %  Min: 90 %  Max: 100 %    I & O - 24hr:    Intake/Output Summary (Last 24 hours) at 2022 1204  Last data filed at 2022 1104  Gross per 24 hour   Intake 3933.27 ml   Output 1775 ml   Net 2158.27 ml     I/O last 3 completed shifts:   In: 5563.6 [I.V.:3726.3; NG/GT:293; IV Piggyback:1544.4]  Out: 3180 [Urine:3180] I/O this shift:  In: -   Out: 150 [Urine:150]   Weight change: -2 lb 3.3 oz (-1 kg)    Physical Exam:  General Appearance: Intubated, sedated, follows commands  HEENT:  ETT to vent, PERRLA, minimal secretions, dry mucous membranes  Lung: Lung sounds diminished bilaterally, scattered rhonchi  Heart: S1/S2, RRR, pulses palpable, no edema  Abdomen: soft, non-tender, hypoactive bowel sounds  Extremities:  No edema, following commands to thumbs up and wiggles toes. Musculokeletal: No joint swelling, no muscle tenderness. ROM normal in all joints of extremities. Neurologic: Mental status: intubated, sedated. Follows commands.       Medications     Continuous Infusions:   dexmedetomidine (PRECEDEX) IV infusion 0.4 mcg/kg/hr (08/02/22 0600)    midazolam Stopped (08/01/22 1232)    sodium chloride 75 mL/hr at 08/02/22 0600    fentaNYL 10 mcg/ml in 0.9%  ml infusion Stopped (08/02/22 1113)    sodium chloride Stopped (08/02/22 0312)     Scheduled Meds:   baricitinib  4 mg Oral Daily    vancomycin  1,000 mg IntraVENous Q12H    docusate sodium  100 mg Oral Daily    sennosides  5 mL Oral Nightly    levETIRAcetam  500 mg IntraVENous Q12H    budesonide  0.5 mg Nebulization BID    Arformoterol Tartrate  15 mcg Nebulization BID    ipratropium-albuterol  1 ampule Inhalation Q4H    nicotine  1 patch TransDERmal Daily    chlorhexidine  15 mL Mouth/Throat BID    sodium chloride flush  10 mL IntraVENous 2 times per day    enoxaparin  40 mg SubCUTAneous Daily    piperacillin-tazobactam  4,500 mg IntraVENous Q8H    sodium chloride flush  5-40 mL IntraVENous 2 times per day    pantoprazole (PROTONIX) 40 mg injection  40 mg IntraVENous Daily    dexamethasone  20 mg IntraVENous Q24H    Followed by    Bianca Carbone ON 8/5/2022] dexamethasone  10 mg IntraVENous Q24H    vitamin D  2,000 Units Oral Daily    ascorbic acid  500 mg Oral Daily    zinc sulfate  50 mg Oral Daily     PRN Meds: hydrALAZINE, midazolam, midazolam, fentanNYL, sodium chloride, promethazine **OR** ondansetron, polyethylene glycol, sodium chloride flush, [DISCONTINUED] acetaminophen **OR** acetaminophen  Nutrition:   NG/OG tube: NPO    Labs and Imaging Studies     CBC:   Recent Labs     07/31/22  0527 08/01/22  0435 08/02/22  0506   WBC 9.0 10.4 7.5   RBC 4.60 4.18 4.25   HGB 13.0 12.1* 12.2*   HCT 39.7 36.2* 36.6*   MCV 86.3 86.6 86.1   MCH 28.3 28.9 28.7   MCHC 32.7 33.4 33.3   RDW 13.7 13.2 13.0    146 136   MPV 10.4 10.5 10.9       BMP:    Recent Labs     07/31/22  0527 08/01/22  0435 08/02/22  0506    136 137   K 4.4 4.4 4.3   * 105 104   CO2 21* 24 26   BUN 11 15 12   CREATININE 0.9 0.8 0.8   GLUCOSE 127* 95 119*   CALCIUM 8.1* 8.1* 8.2*   PROT 6.5 5.8* 6.1*   LABALBU 3.6 3.0* 3.0*   BILITOT 0.3 <0.2 0.2   ALKPHOS 97 74 76   AST 22 18 16   ALT 34 22 18       LIVER PROFILE:   Recent Labs     07/31/22  0527 08/01/22  0435 08/02/22  0506   AST 22 18 16   ALT 34 22 18   BILITOT 0.3 <0.2 0.2   ALKPHOS 97 74 76       PT/INR:   Recent Labs     07/30/22  1725 07/31/22  0527   PROTIME 14.3* 15.8*   INR 1.3 1.4       APTT:   Recent Labs     07/30/22  1725 07/31/22  0527   APTT 32.4 32.6       Fasting Lipid Panel:    No results found for: CHOL, TRIG, HDL    Cardiac Enzymes:    No results found for: CKTOTAL, CKMB, CKMBINDEX, TROPONINI    Notable Cultures:      Blood cultures   Blood Culture, Routine   Date Value Ref Range Status   07/30/2022   Final    This organism was isolated in one set. Susceptibility testing is not routinely done as this  organism frequently represents skin contamination. Additional testing can be ordered by calling the  Microbiology Department. Validated susceptibility testing methods do not exist for  this isolate. Respiratory cultures No results found for: RESPCULTURE No results found for: LABGRAM  Urine   Urine Culture, Routine   Date Value Ref Range Status   07/30/2022 Growth not present  Final     Legionella No results found for: LABLEGI  C Diff PCR No results found for: CDIFPCR  Wound culture/abscess: No results for input(s): WNDABS in the last 72 hours. Tip culture:No results for input(s): CXCATHTIP in the last 72 hours.      Antibiotic  Days  Day started   Zosyn 4 7/30/22                           Oxygen:     Vent Information  Ventilator ID: [id number]    Additional Respiratory Assessments  Heart Rate: 54  Resp: 15  SpO2: 100 %  Position: Semi-Sosa's  Humidification Source: Heated wire  Humidification Temp: 37  Circuit Condensation: Drained       ABG     PH  7.42   PCO2   41   PO2  174   HCO3  26.4   Sat%  99   FIO2  40%   DATES 08/02/22       Lines:  Site  Day  Date inserted     TLC              PICC              Arterial line              Peripheral line   R FA/ R Wrist 3   7/30/22     HD cath            Urinary Catheter-Output (mL): 150 mL    Imaging Studies:    XR CHEST PORTABLE   Final Result   Persistent patchy infiltrate right lung base. MRI BRAIN WO CONTRAST   Final Result   No acute intracranial abnormality visualized. XR CHEST PORTABLE   Final Result   Stable chest with persistent right lower lobe infiltrate. US DUP LOWER EXTREMITIES BILATERAL VENOUS   Final Result   Within the visualized vessels there is no evidence for deep venous   thrombosis               XR CHEST PORTABLE   Final Result   Improving aeration of right lower lobe         XR SHOULDER RIGHT (MIN 2 VIEWS)   Final Result   Ill-defined opacification seen within the right lung base. No evidence of   fracture or dislocation. Degenerative changes seen within the shoulder. XR HIP LEFT (2-3 VIEWS)   Final Result   Degenerative changes in the left hip with no evidence of acute bony   abnormality         XR CHEST PORTABLE   Final Result   Development of some mild increased markings at the left lung base with no   change in the airspace disease at the right lung base. Lines and tubes are   stable. XR ABDOMEN FOR NG/OG/NE TUBE PLACEMENT   Final Result   Adequately positioned nasogastric tube. XR CHEST PORTABLE   Final Result   Atelectasis/infiltrates in the right lower lobe and right middle lobe   concerning for pneumonia. CT HEAD WO CONTRAST   Final Result   No acute intracranial abnormality.       Soft tissue density completely filling the nasal cavity and nasopharynx   probably inflammatory/secretions. Malignancy has to be excluded and direct   visualization recommended. CTA chest.      Comparison September 4, 2014      Findings      The heart and the great vessels are normal.  Nonenlarged mediastinal and   hilar lymph nodes are present. Trachea and major bronchi are patent. There   are no filling defects in the main pulmonary artery and the central branches. Says mucous plugging is identified in the lower lobe bronchi bilaterally. There is atelectasis/infiltrates in the right lower lobe, right middle lobe   and to a lesser extent in the left lower lobe. There is no pleural effusion. Impression      There is no central pulmonary embolism or aortic dissection. Mucous plugging in the lower lobes bilaterally with atelectasis/infiltrates   in the lower lobes more on the right side and right middle lobe concerning   for pneumonia. CT abdomen pelvis. The liver is of normal architecture. Gallbladder is partially distended. Spleen, pancreas, and the adrenals are normal.  There is heterogeneous   enhancement of the kidneys with the areas of decreased attenuation concerning   for pyelonephritis. Dilated fluid-filled small bowel loops are noted likely   ileus/enteritis with small bowel loops measuring up to 2 cm. Pelvis. Bladder is partially distended with the Hampton catheter and the wall   thickening. Colon is normal.  The appendix is partially identified and   appears normal.      Impression      Heterogeneous enhancement of the kidneys concerning for multifocal   pyelonephritis. There is also thickening of the urinary bladder. Please   correlate with urinalysis. Dilated fluid-filled small bowel loops likely ileus/enteritis. CTA PULMONARY W CONTRAST   Final Result   No acute intracranial abnormality.       Soft tissue density completely filling the nasal cavity and nasopharynx   probably inflammatory/secretions. Malignancy has to be excluded and direct   visualization recommended. CTA chest.      Comparison September 4, 2014      Findings      The heart and the great vessels are normal.  Nonenlarged mediastinal and   hilar lymph nodes are present. Trachea and major bronchi are patent. There   are no filling defects in the main pulmonary artery and the central branches. Says mucous plugging is identified in the lower lobe bronchi bilaterally. There is atelectasis/infiltrates in the right lower lobe, right middle lobe   and to a lesser extent in the left lower lobe. There is no pleural effusion. Impression      There is no central pulmonary embolism or aortic dissection. Mucous plugging in the lower lobes bilaterally with atelectasis/infiltrates   in the lower lobes more on the right side and right middle lobe concerning   for pneumonia. CT abdomen pelvis. The liver is of normal architecture. Gallbladder is partially distended. Spleen, pancreas, and the adrenals are normal.  There is heterogeneous   enhancement of the kidneys with the areas of decreased attenuation concerning   for pyelonephritis. Dilated fluid-filled small bowel loops are noted likely   ileus/enteritis with small bowel loops measuring up to 2 cm. Pelvis. Bladder is partially distended with the Hampton catheter and the wall   thickening. Colon is normal.  The appendix is partially identified and   appears normal.      Impression      Heterogeneous enhancement of the kidneys concerning for multifocal   pyelonephritis. There is also thickening of the urinary bladder. Please   correlate with urinalysis. Dilated fluid-filled small bowel loops likely ileus/enteritis. XR CHEST PORTABLE    (Results Pending)        EKG: Rhythm Strip: normal sinus rhythm, unchanged from previous tracings.     APRN- CNP Assessment and PLan   Assessment and Plan:  Acute Metabolic Encephalopathy, Improving following commands. Seizure disorder, new onset  -AMS in the ED, possible seizure activity, CT no acute process  -Continue Levetiracetam   -Neurology consulted, appreciate input.  - EEG no seizure epiltiform    - MRI No intracranial abnormalities.    -Patient is following commands   - Precedex  and Fentanyl gtt. -PRN versed for seizure activity no seizure activity noted. -Continue seizure precautions    Acute hypoxic respiratory failure, 2/2 COVID-19 vs. PNA  COVID-19  Barcitinib 4 mg x 14 doses. Started 08/01/22  Hx of Tobacco use  Hx of Marijuana use   -Procalcitonin 0.06, Lactic acid 0.8, CRP 0.3   -Respiratory culture - moderate mixed bacterial morphotype on gram stain. -CXR daily, CTA negative for PE, atelectatsis/infiltrates in the lower lobes more on the right side and right middle lobe concerns for PNA  -Continue Duonebs, brovana, pulmicort  -Continue Decadron  -Continue zinc, Vit D and ascorbic acid   -Chest physiotherapy  -SAT and SBT,daily.   -Wean FiO2 and keep pulse ox >92%  -Continue Zosyn  -Daily CXR  -Nicotine patch   - SBT   - Plan to extubate today if weaning parameters are adequate. Ileus/Enteritis per CT resolving BM 08/01/22   -Patient currently NPO   - Tube feedings on hold for possible extubation today. -  If extubate bedside swallow. - Dietary consulted for tube feedings. -General Surgery consulted, appreciate input signed off.    - Continue PPI    UTI/Concerns for pyelonephritis   -UA shows trace leukocyte esterase with rare bacteria   -Zosyn day 4, continued. -Urine culture no growth. -Strep and legionella negative   -Blood cultures culture Micrococcus species   -  Vancomycin discontinued. Hx of right shoulder supraspinatus tear-recently presented to the ED on 7/6/22 for right should pain   -Will consult orthopedics to follow up while inpatient.    -MRI from 2019 shows full thickness tear    Hx of Bimalleolar fracture of left ankle s/p internal fixation (2014)    Electrolytes/Fluids/Nutrition: Replace as needed, NS @ 75cc/hour, NPO tube feeding started on hold for possible extubation. Plan of care discussed in multidisciplinary rounds. Dr. Arben Arredondo is the collaborating physician. Samm Eller, [address] [place] [address].

## 2022-08-02 NOTE — PLAN OF CARE
Problem: Respiratory - Adult  Goal: Achieves optimal ventilation and oxygenation  8/2/2022 1003 by Vickie Campo RCP  Outcome: Progressing  8/2/2022 0218 by Antoni Chiu RN  Outcome: Progressing  8/1/2022 2348 by Antoni Chiu RN  Outcome: Progressing     Problem: Musculoskeletal - Adult  Goal: Return mobility to safest level of function  8/2/2022 0218 by Antoni Chiu RN  Outcome: Not Progressing  8/1/2022 2348 by Antoni Chiu RN  Outcome: Not Progressing  Goal: Return ADL status to a safe level of function  8/2/2022 0218 by Antoni Chiu RN  Outcome: Not Progressing  8/1/2022 2348 by Antoni Chiu RN  Outcome: Not Progressing

## 2022-08-02 NOTE — PROGRESS NOTES
Patient nods head in understanding of not pulling at lines/tubes when restraints are loosened. Patient alert in bed and calm at this time. Bilateral wrist restraints discontinued.

## 2022-08-02 NOTE — PLAN OF CARE
Plan of care    Patient in COVID 19 droplet precaution. Spoke with RN outside of patient's room this morning. There have been no witnessed seizures. Patient is alert and follows commands. Plans to be extubated today. MRI brain was negative for acute findings and EEG showed moderate encephalopathy.     Due to negative testing, no history of seizures neurology recommends discontinuing Keppra    No further neurologic testing at this time    Neurology to sign off

## 2022-08-02 NOTE — PLAN OF CARE
Problem: Musculoskeletal - Adult  Goal: Return mobility to safest level of function  Outcome: Not Progressing  Goal: Return ADL status to a safe level of function  Outcome: Not Progressing     Problem: Discharge Planning  Goal: Discharge to home or other facility with appropriate resources  Outcome: Progressing  Flowsheets (Taken 8/1/2022 2000)  Discharge to home or other facility with appropriate resources: Identify barriers to discharge with patient and caregiver     Problem: Safety - Medical Restraint  Goal: Remains free of injury from restraints (Restraint for Interference with Medical Device)  Description: INTERVENTIONS:  1. Determine that other, less restrictive measures have been tried or would not be effective before applying the restraint  2. Evaluate the patient's condition at the time of restraint application  3. Inform patient/family regarding the reason for restraint  4.  Q2H: Monitor safety, psychosocial status, comfort, nutrition and hydration  8/1/2022 2348 by Charo Parra RN  Outcome: Progressing  Flowsheets (Taken 8/1/2022 2000)  Remains free of injury from restraints (restraint for interference with medical device):   Determine that other, less restrictive measures have been tried or would not be effective before applying the restraint   Evaluate the patient's condition at the time of restraint application   Inform patient/family regarding the reason for restraint   Every 2 hours: Monitor safety, psychosocial status, comfort, nutrition and hydration  8/1/2022 1617 by Karey Green RN  Outcome: Progressing     Problem: Pain  Goal: Verbalizes/displays adequate comfort level or baseline comfort level  Outcome: Progressing  Flowsheets (Taken 8/1/2022 2000)  Verbalizes/displays adequate comfort level or baseline comfort level:   Encourage patient to monitor pain and request assistance   Assess pain using appropriate pain scale   Administer analgesics based on type and severity of pain and evaluate response   Implement non-pharmacological measures as appropriate and evaluate response   Consider cultural and social influences on pain and pain management   Notify Licensed Independent Practitioner if interventions unsuccessful or patient reports new pain     Problem: Neurosensory - Adult  Goal: Achieves stable or improved neurological status  Outcome: Progressing  Flowsheets (Taken 8/1/2022 2000)  Achieves stable or improved neurological status:   Assess for and report changes in neurological status   Initiate measures to prevent increased intracranial pressure  Goal: Absence of seizures  Outcome: Progressing  Flowsheets (Taken 8/1/2022 2000)  Absence of seizures:   Monitor for seizure activity.   If seizure occurs, document type and location of movements and any associated apnea   If seizure occurs, turn head to side and suction secretions as needed     Problem: Respiratory - Adult  Goal: Achieves optimal ventilation and oxygenation  Outcome: Progressing     Problem: Cardiovascular - Adult  Goal: Maintains optimal cardiac output and hemodynamic stability  Outcome: Progressing  Goal: Absence of cardiac dysrhythmias or at baseline  Outcome: Progressing     Problem: Skin/Tissue Integrity - Adult  Goal: Skin integrity remains intact  Outcome: Progressing  Flowsheets (Taken 8/1/2022 2000)  Skin Integrity Remains Intact:   Monitor for areas of redness and/or skin breakdown   Assess vascular access sites hourly  Goal: Oral mucous membranes remain intact  Outcome: Progressing     Problem: Gastrointestinal - Adult  Goal: Minimal or absence of nausea and vomiting  Outcome: Progressing  Flowsheets (Taken 8/1/2022 2000)  Minimal or absence of nausea and vomiting:   Administer IV fluids as ordered to ensure adequate hydration   Maintain NPO status until nausea and vomiting are resolved  Goal: Maintains or returns to baseline bowel function  Outcome: Progressing  Flowsheets (Taken 8/1/2022 2000)  Maintains or returns to baseline bowel function: Assess bowel function  Goal: Maintains adequate nutritional intake  Outcome: Progressing     Problem: Genitourinary - Adult  Goal: Urinary catheter remains patent  Outcome: Progressing     Problem: Infection - Adult  Goal: Absence of infection at discharge  Outcome: Progressing  Flowsheets (Taken 8/1/2022 2000)  Absence of infection at discharge:   Assess and monitor for signs and symptoms of infection   Monitor lab/diagnostic results   Monitor all insertion sites i.e., indwelling lines, tubes and drains   Monitor endotracheal (as able) and nasal secretions for changes in amount and color  Goal: Absence of infection during hospitalization  Outcome: Progressing  Flowsheets (Taken 8/1/2022 2000)  Absence of infection during hospitalization:   Assess and monitor for signs and symptoms of infection   Monitor lab/diagnostic results   Monitor all insertion sites i.e., indwelling lines, tubes and drains   Monitor endotracheal (as able) and nasal secretions for changes in amount and color  Goal: Absence of fever/infection during anticipated neutropenic period  Outcome: Progressing  Flowsheets (Taken 8/1/2022 2000)  Absence of fever/infection during anticipated neutropenic period: Monitor white blood cell count     Problem: Metabolic/Fluid and Electrolytes - Adult  Goal: Electrolytes maintained within normal limits  Outcome: Progressing  Flowsheets (Taken 8/1/2022 2000)  Electrolytes maintained within normal limits:   Monitor labs and assess patient for signs and symptoms of electrolyte imbalances   Administer electrolyte replacement as ordered   Monitor response to electrolyte replacements, including repeat lab results as appropriate  Goal: Hemodynamic stability and optimal renal function maintained  Outcome: Progressing  Flowsheets (Taken 8/1/2022 2000)  Hemodynamic stability and optimal renal function maintained:   Monitor labs and assess for signs and symptoms of volume excess or deficit Monitor intake, output and patient weight  Goal: Glucose maintained within prescribed range  Outcome: Progressing  Flowsheets (Taken 8/1/2022 2000)  Glucose maintained within prescribed range: Monitor blood glucose as ordered     Problem: Hematologic - Adult  Goal: Maintains hematologic stability  Outcome: Progressing  Flowsheets (Taken 8/1/2022 2000)  Maintains hematologic stability:   Assess for signs and symptoms of bleeding or hemorrhage   Monitor labs for bleeding or clotting disorders     Problem: ABCDS Injury Assessment  Goal: Absence of physical injury  Outcome: Progressing  Flowsheets (Taken 8/1/2022 2000)  Absence of Physical Injury: Implement safety measures based on patient assessment     Problem: Safety - Adult  Goal: Free from fall injury  Outcome: Progressing  Flowsheets (Taken 8/1/2022 2000)  Free From Fall Injury:   Instruct family/caregiver on patient safety   Based on caregiver fall risk screen, instruct family/caregiver to ask for assistance with transferring infant if caregiver noted to have fall risk factors     Problem: Skin/Tissue Integrity  Goal: Absence of new skin breakdown  Description: 1. Monitor for areas of redness and/or skin breakdown  2. Assess vascular access sites hourly  3. Every 4-6 hours minimum:  Change oxygen saturation probe site  4. Every 4-6 hours:  If on nasal continuous positive airway pressure, respiratory therapy assess nares and determine need for appliance change or resting period.   Outcome: Progressing     Problem: Musculoskeletal - Adult  Goal: Return mobility to safest level of function  Outcome: Not Progressing  Goal: Return ADL status to a safe level of function  Outcome: Not Progressing

## 2022-08-02 NOTE — PROGRESS NOTES
Patient was orally and endotracheally suctioned before extubation. Patient was extubated to 3 liters/min via nasal cannula. Breath Sounds post extubation were diminished. Stridor was not present post extubation. SPO2 was 100%.       Performed by  Jose Saleh RCP

## 2022-08-02 NOTE — PROGRESS NOTES
Patient was suctioned both orally and endotracheally before extubation. Patient was extubated to 4 liters/min via nasal cannula. Breath Sounds post extubation were diminished. Stridor was not present post extubation. SPO2 was 100%.       Performed by  Papi Fernandez RCP

## 2022-08-02 NOTE — PROGRESS NOTES
08/01/22 2300   Patient Transport   Time Spent Transporting 46-60   Transport Ventillation Type Transport vent   Transport From ICU   Transport Destination MRI   Transport Destination ICU   Emergency Equipment Included Yes

## 2022-08-02 NOTE — PROGRESS NOTES
Comprehensive Nutrition Assessment    Type and Reason for Visit:  Initial, Consult (TF recs)    Nutrition Recommendations/Plan:     Continue NPO, Modify Tube Feeding to better meet est needs:     Standard with fiber (Jevity 1.5) @ 50 ml/hr + 1 protein modular daily via feeding tube. Will provide: 1200 ml tv, 1800 kcals, 76 gm pro (1900 kcals & 102 gm pro w/ mod), 912 ml free water  Regimen meets 100% est calorie & protein needs       Malnutrition Assessment:  Malnutrition Status:  Insufficient data (08/02/22 1254)    Context:  Acute Illness     Findings of the 6 clinical characteristics of malnutrition:  Energy Intake:  Mild decrease in energy intake(BREA PTA intakes)  Weight Loss:  Unable to assess (no wt hx on file)     Body Fat Loss:  Unable to assess (d/t covid iso)     Muscle Mass Loss:  Unable to assess (d/t covid iso)    Fluid Accumulation:  No significant fluid accumulation     Strength:  Not Performed    Nutrition Assessment:    Pt admit w/ respiratory failure/ acute encephalopathy 2/2 +COVID-19 vs PNA. Noted possible new onset seizure. Noted UTI/Concerns for pyelonephritis. PMHx recent R rotator cuff tear. EN support initiated at trickle rate, will provide goal TF rec & monitor.     Nutrition Related Findings:    Pt intubated, off sedation, MAP WNL, +I/O's 3L, no edema, hypoactive BS, OGT w/ TF Wound Type: None       Current Nutrition Intake & Therapies:    Average Meal Intake: NPO    Current Tube Feeding (TF) Orders:  Feeding Route: Orogastric  Formula: Standard without Fiber  Schedule: Continuous  Feeding Regimen: 40 ml/hr, running at 10 ml/hr just started  Water Flushes: 30 ml q 4 hr= 180 ml water  Goal TF & Flush Orders Provides: 960 ml tv, 1152 kcals, 53 gm pro, 787 ml free water, 967 ml total water w/ flushes    Anthropometric Measures:  Height: 6' 1\" (185.4 cm)  Ideal Body Weight (IBW): 184 lbs (84 kg)    Admission Body Weight: 150 lb (68 kg) (7/31 first measured)  Current Body Weight: 149 lb 11.1 oz (67.9 kg) (8/2 actual), 81.4 % IBW. Current BMI (kg/m2): 19.8                          BMI Categories: Normal Weight (BMI 18.5-24. 9)    Estimated Daily Nutrient Needs:  Energy Requirements Based On: Formula  Weight Used for Energy Requirements: Current  Energy (kcal/day): PS3B 3967-1059  Weight Used for Protein Requirements: Current  Protein (g/day): 1.3-1.5 g/kg CBW;   Fluid (ml/day): per critical care    Nutrition Diagnosis:   Inadequate oral intake related to impaired respiratory function as evidenced by NPO or clear liquid status due to medical condition, intubation, nutrition support - enteral nutrition    Nutrition Interventions:   Nutrition Education/Counseling: Education not appropriate  Coordination of Nutrition Care: Continue to monitor while inpatient      Goals:     Goals: Tolerate nutrition support at goal rate, by next RD assessment       Nutrition Monitoring and Evaluation:      Food/Nutrient Intake Outcomes: Enteral Nutrition Intake/Tolerance  Physical Signs/Symptoms Outcomes: Biochemical Data, Nutrition Focused Physical Findings, Skin, Weight, GI Status, Fluid Status or Edema, Hemodynamic Status    Discharge Planning:     Too soon to determine     Joey Samaniego RD, LD  Contact: Ext [phone]

## 2022-08-02 NOTE — PLAN OF CARE
Problem: Discharge Planning  Goal: Discharge to home or other facility with appropriate resources  8/2/2022 1409 by Marlen Jung RN  Outcome: Not Progressing  8/2/2022 0218 by Leatha Grimm RN  Outcome: Progressing     Problem: Musculoskeletal - Adult  Goal: Return mobility to safest level of function  8/2/2022 0218 by Leatha Grimm RN  Outcome: Not Progressing  Goal: Return ADL status to a safe level of function  8/2/2022 0218 by Leatha Grimm RN  Outcome: Not Progressing

## 2022-08-02 NOTE — PROGRESS NOTES
PS trial initiated with a PS of 8, PEEP of 8, FiO2 of 40%. 08/02/22 1140   NICU Vent Information   Vent Mode (S)  PS   Vt Exhaled 527 mL   Rate Measured 10 br/min   Minute Volume 5.28 Liters   Pressure Support (S)  8 cmH20   FiO2  40 %   Peak Inspiratory Pressure 17 cmH2O   I:E Ratio 1:8.50   PEEP/CPAP (cmH2O) 8   Mean Airway Pressure 9 cmH20   Breath Sounds   Right Upper Lobe Diminished   Right Middle Lobe Diminished   Right Lower Lobe Diminished   Left Upper Lobe Diminished   Left Lower Lobe Diminished   Additional Respiratory Assessments   Heart Rate 54   Resp 15   SpO2 100 %   Position Semi-Sosa's   Humidification Source Heated wire   Humidification Temp 37   Vent Alarm Settings   High Pressure  50 cmH2O   Ve Max 20   Ve Min 4.5   Low Exhaled Vt  0 mL   Vt Low  300 mL   Vt High  850 mL   RR High 35 br/min   Apnea (Secs) 20 secs   Ambu Bag With Mask At Bedside Yes   ETT (adult)   Placement Date/Time: 07/30/22 1705   Present on Admission/Arrival: No  Placed By: In ED  Placement Verified By: Auscultation;Capnometry; Chest X-ray  Preoxygenation: Yes  Mask Ventilation: Ventilated by mask (1)  Airway Type: Cuffed  Airway Tube Size: . ..    Secured At 26 cm   Measured From Lips   ETT Placement Left   Secured By Commercial tube [email or web address]   Site Assessment Dry

## 2022-08-02 NOTE — PROGRESS NOTES
Hospitalist Progress Note      Synopsis: Patient admitted on 7/30/2022 with chest pain and shortness of breath. Patient became unresponsive and developed acute hypoxic respiratory failure. He was placed on an non re-breather and ultimately intubated for airway protection. CTA of the chest showed no PE but possible bilateral pneumonia vs atelectasis worse on the right. Patient started on Zosyn for aspiration Pneumonia. He did test positive for COVID. Started on decadron, zinc, and vitamin C. Given concern for possible seizure. Patient loaded with keppra and neurology consulted. CTAP showed possible pyelonephritis and enteritis/ileus. General surgery consulted and patient is on zosyn and vancomycin. EEG showed moderate encephalopathy. General surgery evaluated the patient and started tube feeds. Subjective  Patient seen at bedside on the ventilator  Records reviewed  PEEP - 8, FiO2- 40    Exam:  Vitals:    08/02/22 0600   BP: (!) 158/94   Pulse: 54   Resp: 14   Temp:    SpO2: 100%       **Patient with COVID on ventilator--Management as per ICU and consultants** Observed from window. General appearance: Intubated and sedated , appears stated age   Respiratory:  Ventilated   Cardiovascular: Regular rate   Musculoskeletal:Intubated and sedated   Skin:  No rashes    Neurologic: intubated, sedated. ASSESSMENT AND PLAN     Acute Hypoxic Respiratory Failure  -Patient is currently intubated and sedated  -Continue ventilator support  -Likely multifactorial with COVID, Aspiration Pneumonia, and ?seizure    Aspiration Pneumonia  Patient being covered with Zosyn and vancomycin.    XR chest -8/2/22 shows persistent infiltrate right lung base    COVID 19  -Patient is on decadron, vitamin C, and Zinc . Baricitinib for 2 weeks from 8/2/22-8/15/22    Seizure  -Patient loaded with keppra   -Neurology consulted  -MRI brain -8/1/22 showed no acute findings  - EEG showed moderate encephalopathy    Enteritis/Ileus  General surgery consulted  Patient okay for tube feeding. Rotator cuff tear   -Outpatient follow up as per ortho.      Management as per ICU team.       Medications:  Reviewed    Infusion Medications    dexmedetomidine (PRECEDEX) IV infusion 0.4 mcg/kg/hr (08/02/22 0600)    midazolam Stopped (08/01/22 1232)    sodium chloride 75 mL/hr at 08/02/22 0600    fentaNYL 10 mcg/ml in 0.9%  ml infusion 150 mcg/hr (08/02/22 0600)    sodium chloride Stopped (08/02/22 0312)     Scheduled Medications    baricitinib  4 mg Oral Daily    vancomycin  1,000 mg IntraVENous Q12H    docusate sodium  100 mg Oral Daily    sennosides  5 mL Oral Nightly    levETIRAcetam  500 mg IntraVENous Q12H    budesonide  0.5 mg Nebulization BID    Arformoterol Tartrate  15 mcg Nebulization BID    ipratropium-albuterol  1 ampule Inhalation Q4H    nicotine  1 patch TransDERmal Daily    chlorhexidine  15 mL Mouth/Throat BID    sodium chloride flush  10 mL IntraVENous 2 times per day    enoxaparin  40 mg SubCUTAneous Daily    piperacillin-tazobactam  4,500 mg IntraVENous Q8H    sodium chloride flush  5-40 mL IntraVENous 2 times per day    pantoprazole (PROTONIX) 40 mg injection  40 mg IntraVENous Daily    dexamethasone  20 mg IntraVENous Q24H    Followed by    Arvin Hoffman ON 8/5/2022] dexamethasone  10 mg IntraVENous Q24H    vitamin D  2,000 Units Oral Daily    ascorbic acid  500 mg Oral Daily    zinc sulfate  50 mg Oral Daily     PRN Meds: hydrALAZINE, midazolam, midazolam, fentanNYL, sodium chloride, promethazine **OR** ondansetron, polyethylene glycol, sodium chloride flush, [DISCONTINUED] acetaminophen **OR** acetaminophen    I/O    Intake/Output Summary (Last 24 hours) at 8/2/2022 0726  Last data filed at 8/2/2022 0600  Gross per 24 hour   Intake 4033.27 ml   Output 2275 ml   Net 1758.27 ml       Labs:   Recent Labs     07/31/22  0527 08/01/22  0435 08/02/22  0506   WBC 9.0 10.4 7.5   HGB 13.0 12.1* 12.2*   HCT 39.7 36.2* 36.6*    146 136 Recent Labs     07/30/22 2230 07/31/22  0527 08/01/22  0435 08/02/22  0506    138 136 137   K 4.2 4.4 4.4 4.3    108* 105 104   CO2 23 21* 24 26   BUN 9 11 15 12   CREATININE 0.8 0.9 0.8 0.8   CALCIUM 7.9* 8.1* 8.1* 8.2*   PHOS 2.7  --  2.4* 2.8       Recent Labs     07/31/22 0527 08/01/22  0435 08/02/22  0506   PROT 6.5 5.8* 6.1*   ALKPHOS 97 74 76   ALT 34 22 18   AST 22 18 16   BILITOT 0.3 <0.2 0.2       Recent Labs     07/30/22  1725 07/31/22  0527   INR 1.3 1.4       No results for input(s): Assunta Hernandez in the last 72 hours. Chronic labs:  Lab Results   Component Value Date    INR 1.4 07/31/2022       Radiology:  Imaging studies reviewed today.    +++++++++++++++++++++++++++++++++++++++++++++++++  Hudson Lemus MD   [facility].  +++++++++++++++++++++++++++++++++++++++++++++++++  NOTE: This report was transcribed using voice recognition software. Every effort was made to ensure accuracy; however, inadvertent computerized transcription errors may be present.

## 2022-08-02 NOTE — PROGRESS NOTES
Spontaneous Parameters performed    VT = 504 ml  f = 10  B/M  Ve = 4.89 L/M  NIF = -37  cmH2O  VC = 800 mL  RSBI = 18      Performed by Tiago Keating RCP

## 2022-08-02 NOTE — PLAN OF CARE
Problem: Musculoskeletal - Adult  Goal: Return mobility to safest level of function  8/2/2022 0218 by Noam Scott RN  Outcome: Not Progressing  8/1/2022 2348 by Noam Scott RN  Outcome: Not Progressing  Goal: Return ADL status to a safe level of function  8/2/2022 0218 by Noam Scott RN  Outcome: Not Progressing  8/1/2022 2348 by Noam Scott RN  Outcome: Not Progressing     Problem: Discharge Planning  Goal: Discharge to home or other facility with appropriate resources  8/2/2022 0218 by Noam Scott RN  Outcome: Progressing  8/1/2022 2348 by Noam Scott RN  Outcome: Progressing  Flowsheets (Taken 8/1/2022 2000)  Discharge to home or other facility with appropriate resources: Identify barriers to discharge with patient and caregiver     Problem: Pain  Goal: Verbalizes/displays adequate comfort level or baseline comfort level  8/2/2022 0218 by Noam Scott RN  Outcome: Progressing  8/1/2022 2348 by Noam Scott RN  Outcome: Progressing  Flowsheets (Taken 8/1/2022 2000)  Verbalizes/displays adequate comfort level or baseline comfort level:   Encourage patient to monitor pain and request assistance   Assess pain using appropriate pain scale   Administer analgesics based on type and severity of pain and evaluate response   Implement non-pharmacological measures as appropriate and evaluate response   Consider cultural and social influences on pain and pain management   Notify Licensed Independent Practitioner if interventions unsuccessful or patient reports new pain     Problem: Neurosensory - Adult  Goal: Achieves stable or improved neurological status  8/2/2022 0218 by Noam Scott RN  Outcome: Progressing  8/1/2022 2348 by Noam Scott RN  Outcome: Progressing  Flowsheets (Taken 8/1/2022 2000)  Achieves stable or improved neurological status:   Assess for and report changes in neurological status   Initiate measures to prevent increased intracranial pressure  Goal: Absence of seizures  8/2/2022 0218 by Ry Smith RN  Outcome: Progressing  8/1/2022 2348 by Ry Smith RN  Outcome: Progressing  Flowsheets (Taken 8/1/2022 2000)  Absence of seizures:   Monitor for seizure activity.   If seizure occurs, document type and location of movements and any associated apnea   If seizure occurs, turn head to side and suction secretions as needed     Problem: Respiratory - Adult  Goal: Achieves optimal ventilation and oxygenation  8/2/2022 0218 by Ry Smith RN  Outcome: Progressing  8/1/2022 2348 by Ry Smith RN  Outcome: Progressing     Problem: Cardiovascular - Adult  Goal: Maintains optimal cardiac output and hemodynamic stability  8/2/2022 0218 by Ry Smith RN  Outcome: Progressing  8/1/2022 2348 by Ry Smith RN  Outcome: Progressing  Goal: Absence of cardiac dysrhythmias or at baseline  8/2/2022 0218 by Ry Smith RN  Outcome: Progressing  8/1/2022 2348 by Ry Smith RN  Outcome: Progressing     Problem: Skin/Tissue Integrity - Adult  Goal: Skin integrity remains intact  8/2/2022 0218 by Ry Smith RN  Outcome: Progressing  Flowsheets (Taken 8/2/2022 0000)  Skin Integrity Remains Intact:   Monitor for areas of redness and/or skin breakdown   Assess vascular access sites hourly  8/1/2022 2348 by Ry Smith RN  Outcome: Progressing  Flowsheets (Taken 8/1/2022 2000)  Skin Integrity Remains Intact:   Monitor for areas of redness and/or skin breakdown   Assess vascular access sites hourly  Goal: Oral mucous membranes remain intact  8/2/2022 0218 by Ry Smith RN  Outcome: Progressing  8/1/2022 2348 by Ry Smith RN  Outcome: Progressing     Problem: Gastrointestinal - Adult  Goal: Minimal or absence of nausea and vomiting  8/2/2022 0218 by Ry Smith RN  Outcome: Progressing  8/1/2022 2348 by Ry Smith RN  Outcome: Progressing  Flowsheets (Taken 8/1/2022 2000)  Minimal or absence of nausea and vomiting:   Administer IV fluids as ordered to ensure adequate hydration   Maintain NPO status until nausea and vomiting are resolved  Goal: Maintains or returns to baseline bowel function  8/2/2022 0218 by Rafia Jonas RN  Outcome: Progressing  8/1/2022 2348 by Rafia Jonas RN  Outcome: Progressing  Flowsheets (Taken 8/1/2022 2000)  Maintains or returns to baseline bowel function: Assess bowel function  Goal: Maintains adequate nutritional intake  8/2/2022 0218 by Rafia Jonas RN  Outcome: Progressing  8/1/2022 2348 by Rafia Jonas RN  Outcome: Progressing     Problem: Genitourinary - Adult  Goal: Urinary catheter remains patent  8/2/2022 0218 by Rafia Jonas RN  Outcome: Progressing  8/1/2022 2348 by Rafia Jonas RN  Outcome: Progressing     Problem: Infection - Adult  Goal: Absence of infection at discharge  8/2/2022 0218 by Rafia Jonas RN  Outcome: Progressing  8/1/2022 2348 by Rafia Jonas RN  Outcome: Progressing  Flowsheets (Taken 8/1/2022 2000)  Absence of infection at discharge:   Assess and monitor for signs and symptoms of infection   Monitor lab/diagnostic results   Monitor all insertion sites i.e., indwelling lines, tubes and drains   Monitor endotracheal (as able) and nasal secretions for changes in amount and color  Goal: Absence of infection during hospitalization  8/2/2022 0218 by Rafia Jonas RN  Outcome: Progressing  8/1/2022 2348 by Rafia Jonas RN  Outcome: Progressing  Flowsheets (Taken 8/1/2022 2000)  Absence of infection during hospitalization:   Assess and monitor for signs and symptoms of infection   Monitor lab/diagnostic results   Monitor all insertion sites i.e., indwelling lines, tubes and drains   Monitor endotracheal (as able) and nasal secretions for changes in amount and color  Goal: Absence of fever/infection during anticipated neutropenic period  8/2/2022 0218 by Rafia Jonas RN  Outcome: Progressing  8/1/2022 2348 by Rafia Jonas RN  Outcome: Progressing  Flowsheets (Taken 8/1/2022 2000)  Absence of fever/infection during anticipated neutropenic period: Monitor white blood cell count     Problem: Metabolic/Fluid and Electrolytes - Adult  Goal: Electrolytes maintained within normal limits  8/2/2022 0218 by Radha Nunez RN  Outcome: Progressing  8/1/2022 2348 by Radha Nunez RN  Outcome: Progressing  Flowsheets (Taken 8/1/2022 2000)  Electrolytes maintained within normal limits:   Monitor labs and assess patient for signs and symptoms of electrolyte imbalances   Administer electrolyte replacement as ordered   Monitor response to electrolyte replacements, including repeat lab results as appropriate  Goal: Hemodynamic stability and optimal renal function maintained  8/2/2022 0218 by Radha Nunez RN  Outcome: Progressing  8/1/2022 2348 by Radha Nunez RN  Outcome: Progressing  Flowsheets (Taken 8/1/2022 2000)  Hemodynamic stability and optimal renal function maintained:   Monitor labs and assess for signs and symptoms of volume excess or deficit   Monitor intake, output and patient weight  Goal: Glucose maintained within prescribed range  8/2/2022 0218 by Radha Nunez RN  Outcome: Progressing  8/1/2022 2348 by Radha Nunez RN  Outcome: Progressing  Flowsheets (Taken 8/1/2022 2000)  Glucose maintained within prescribed range: Monitor blood glucose as ordered     Problem: Hematologic - Adult  Goal: Maintains hematologic stability  8/2/2022 0218 by Radha Nunez RN  Outcome: Progressing  8/1/2022 2348 by Radha Nunez RN  Outcome: Progressing  Flowsheets (Taken 8/1/2022 2000)  Maintains hematologic stability:   Assess for signs and symptoms of bleeding or hemorrhage   Monitor labs for bleeding or clotting disorders     Problem: ABCDS Injury Assessment  Goal: Absence of physical injury  8/2/2022 0218 by Radha Nunez RN  Outcome: Progressing  Flowsheets (Taken 8/2/2022 0000)  Absence of Physical Injury: Implement safety measures based on patient assessment  8/1/2022 2348 by Radha Nunez RN  Outcome: Progressing  Flowsheets (Taken 8/1/2022 2000)  Absence of Physical Injury: Implement safety measures based on patient assessment     Problem: Safety - Adult  Goal: Free from fall injury  8/2/2022 0218 by Perry Hawthorne RN  Outcome: Progressing  Flowsheets (Taken 8/2/2022 0000)  Free From Fall Injury:   Instruct family/caregiver on patient safety   Based on caregiver fall risk screen, instruct family/caregiver to ask for assistance with transferring infant if caregiver noted to have fall risk factors  8/1/2022 2348 by Perry Hawthorne RN  Outcome: Progressing  Flowsheets (Taken 8/1/2022 2000)  Free From Fall Injury:   Instruct family/caregiver on patient safety   Based on caregiver fall risk screen, instruct family/caregiver to ask for assistance with transferring infant if caregiver noted to have fall risk factors     Problem: Skin/Tissue Integrity  Goal: Absence of new skin breakdown  Description: 1. Monitor for areas of redness and/or skin breakdown  2. Assess vascular access sites hourly  3. Every 4-6 hours minimum:  Change oxygen saturation probe site  4. Every 4-6 hours:  If on nasal continuous positive airway pressure, respiratory therapy assess nares and determine need for appliance change or resting period.   8/2/2022 0218 by Perry Hawthorne RN  Outcome: Progressing  8/1/2022 2348 by Perry Hawthorne RN  Outcome: Progressing     Problem: Musculoskeletal - Adult  Goal: Return mobility to safest level of function  8/2/2022 0218 by Perry Hawthorne RN  Outcome: Not Progressing  8/1/2022 2348 by Perry Hawthorne RN  Outcome: Not Progressing  Goal: Return ADL status to a safe level of function  8/2/2022 0218 by Perry Hawthorne RN  Outcome: Not Progressing  8/1/2022 2348 by Perry Hawthorne RN  Outcome: Not Progressing

## 2022-08-02 NOTE — CARE COORDINATION
Social Work Discharge Planning:  COVID + intubated. SW spoke with patient's cousin Tosha Pacheco for initial assessment. Patient lives with a cousin in a 2 story. Patient independent prior to admission. Patient has no hx with with [facility], and has no proference if recommended. Patient PCP is Dr.Benjamin Gould and pharmacy is [facility] on [address]. Patient family will transport the patient home at discharge. SW will continue to follow and assist with transition of care.   Electronically signed by DEXTER Ordonez on 8/2/2022 at 11:53 AM

## 2022-08-02 NOTE — PROGRESS NOTES
Pharmacy Consultation Note  (Antibiotic Dosing and Monitoring)    Initial consult date: 8/1/22  Consulting physician/provider: Mariela Galdamez APRN-CNP  Drug: Vancomycin  Indication: Bloodstream infection    Vancomycin has been discontinued   Clinical Pharmacy to sign-off  Physician to re-consult pharmacy if future dosing is needed    Thank you for the consult,    Maria T Mcdonough, PharmD, [facility] 8/2/2022 12:25 PM

## 2022-08-03 ENCOUNTER — APPOINTMENT (OUTPATIENT)
Dept: GENERAL RADIOLOGY | Age: 47
DRG: 137 | End: 2022-08-03
Payer: MEDICAID

## 2022-08-03 LAB
ALBUMIN SERPL-MCNC: 3.5 G/DL (ref 3.5–5.2)
ALP BLD-CCNC: 84 U/L (ref 40–129)
ALT SERPL-CCNC: 23 U/L (ref 0–40)
ANION GAP SERPL CALCULATED.3IONS-SCNC: 11 MMOL/L (ref 7–16)
AST SERPL-CCNC: 20 U/L (ref 0–39)
BASOPHILS ABSOLUTE: 0 E9/L (ref 0–0.2)
BASOPHILS RELATIVE PERCENT: 0 % (ref 0–2)
BILIRUB SERPL-MCNC: 0.4 MG/DL (ref 0–1.2)
BUN BLDV-MCNC: 12 MG/DL (ref 6–20)
CALCIUM IONIZED: 1.27 MMOL/L (ref 1.15–1.33)
CALCIUM SERPL-MCNC: 8.9 MG/DL (ref 8.6–10.2)
CHLORIDE BLD-SCNC: 106 MMOL/L (ref 98–107)
CO2: 26 MMOL/L (ref 22–29)
CREAT SERPL-MCNC: 0.9 MG/DL (ref 0.7–1.2)
EOSINOPHILS ABSOLUTE: 0 E9/L (ref 0.05–0.5)
EOSINOPHILS RELATIVE PERCENT: 0 % (ref 0–6)
GFR AFRICAN AMERICAN: >60
GFR NON-AFRICAN AMERICAN: >60 ML/MIN/1.73
GLUCOSE BLD-MCNC: 94 MG/DL (ref 74–99)
HCT VFR BLD CALC: 39.4 % (ref 37–54)
HEMOGLOBIN: 13.2 G/DL (ref 12.5–16.5)
IMMATURE GRANULOCYTES #: 0.07 E9/L
IMMATURE GRANULOCYTES %: 0.7 % (ref 0–5)
LYMPHOCYTES ABSOLUTE: 0.83 E9/L (ref 1.5–4)
LYMPHOCYTES RELATIVE PERCENT: 8.3 % (ref 20–42)
MAGNESIUM: 1.8 MG/DL (ref 1.6–2.6)
MCH RBC QN AUTO: 28.3 PG (ref 26–35)
MCHC RBC AUTO-ENTMCNC: 33.5 % (ref 32–34.5)
MCV RBC AUTO: 84.4 FL (ref 80–99.9)
MONOCYTES ABSOLUTE: 0.53 E9/L (ref 0.1–0.95)
MONOCYTES RELATIVE PERCENT: 5.3 % (ref 2–12)
NEUTROPHILS ABSOLUTE: 8.52 E9/L (ref 1.8–7.3)
NEUTROPHILS RELATIVE PERCENT: 85.7 % (ref 43–80)
OVALOCYTES: ABNORMAL
PDW BLD-RTO: 13.1 FL (ref 11.5–15)
PHOSPHORUS: 3 MG/DL (ref 2.5–4.5)
PLATELET # BLD: 187 E9/L (ref 130–450)
PMV BLD AUTO: 10.9 FL (ref 7–12)
POIKILOCYTES: ABNORMAL
POTASSIUM SERPL-SCNC: 3.8 MMOL/L (ref 3.5–5)
RBC # BLD: 4.67 E12/L (ref 3.8–5.8)
SODIUM BLD-SCNC: 143 MMOL/L (ref 132–146)
TOTAL PROTEIN: 6.9 G/DL (ref 6.4–8.3)
WBC # BLD: 10 E9/L (ref 4.5–11.5)

## 2022-08-03 PROCEDURE — 85025 COMPLETE CBC W/AUTO DIFF WBC: CPT

## 2022-08-03 PROCEDURE — 6360000002 HC RX W HCPCS: Performed by: NURSE PRACTITIONER

## 2022-08-03 PROCEDURE — 97166 OT EVAL MOD COMPLEX 45 MIN: CPT

## 2022-08-03 PROCEDURE — 97161 PT EVAL LOW COMPLEX 20 MIN: CPT

## 2022-08-03 PROCEDURE — 6360000002 HC RX W HCPCS

## 2022-08-03 PROCEDURE — 2580000003 HC RX 258: Performed by: NURSE PRACTITIONER

## 2022-08-03 PROCEDURE — 82330 ASSAY OF CALCIUM: CPT

## 2022-08-03 PROCEDURE — 83735 ASSAY OF MAGNESIUM: CPT

## 2022-08-03 PROCEDURE — 94640 AIRWAY INHALATION TREATMENT: CPT

## 2022-08-03 PROCEDURE — 2580000003 HC RX 258: Performed by: FAMILY MEDICINE

## 2022-08-03 PROCEDURE — 6360000002 HC RX W HCPCS: Performed by: FAMILY MEDICINE

## 2022-08-03 PROCEDURE — 2060000000 HC ICU INTERMEDIATE R&B

## 2022-08-03 PROCEDURE — 97535 SELF CARE MNGMENT TRAINING: CPT

## 2022-08-03 PROCEDURE — 97530 THERAPEUTIC ACTIVITIES: CPT

## 2022-08-03 PROCEDURE — 80053 COMPREHEN METABOLIC PANEL: CPT

## 2022-08-03 PROCEDURE — 99233 SBSQ HOSP IP/OBS HIGH 50: CPT | Performed by: INTERNAL MEDICINE

## 2022-08-03 PROCEDURE — 84100 ASSAY OF PHOSPHORUS: CPT

## 2022-08-03 RX ADMIN — ARFORMOTEROL TARTRATE 15 MCG: 15 SOLUTION RESPIRATORY (INHALATION) at 20:26

## 2022-08-03 RX ADMIN — PIPERACILLIN AND TAZOBACTAM 4500 MG: 4; .5 INJECTION, POWDER, FOR SOLUTION INTRAVENOUS at 03:50

## 2022-08-03 RX ADMIN — ARFORMOTEROL TARTRATE 15 MCG: 15 SOLUTION RESPIRATORY (INHALATION) at 08:51

## 2022-08-03 RX ADMIN — SODIUM CHLORIDE: 9 INJECTION, SOLUTION INTRAVENOUS at 02:13

## 2022-08-03 RX ADMIN — BUDESONIDE 500 MCG: 0.5 SUSPENSION RESPIRATORY (INHALATION) at 08:51

## 2022-08-03 RX ADMIN — SODIUM CHLORIDE, PRESERVATIVE FREE 10 ML: 5 INJECTION INTRAVENOUS at 19:52

## 2022-08-03 RX ADMIN — LEVETIRACETAM 500 MG: 5 INJECTION INTRAVENOUS at 05:46

## 2022-08-03 RX ADMIN — HYDRALAZINE HYDROCHLORIDE 10 MG: 20 INJECTION INTRAMUSCULAR; INTRAVENOUS at 06:03

## 2022-08-03 RX ADMIN — BUDESONIDE 500 MCG: 0.5 SUSPENSION RESPIRATORY (INHALATION) at 20:26

## 2022-08-03 RX ADMIN — DEXAMETHASONE SODIUM PHOSPHATE 20 MG: 4 INJECTION, SOLUTION INTRAMUSCULAR; INTRAVENOUS at 10:49

## 2022-08-03 RX ADMIN — SODIUM CHLORIDE, PRESERVATIVE FREE 10 ML: 5 INJECTION INTRAVENOUS at 19:51

## 2022-08-03 ASSESSMENT — PULMONARY FUNCTION TESTS: PIF_VALUE: 0

## 2022-08-03 NOTE — PROGRESS NOTES
[address] [facility]   Department of Internal Medicine   MICU Progress Note    Patient:  Mynor Salinas 49 y.o. male   MRN: [id number]       Date of Service: 8/3/2022    Allergy: Patient has no known allergies. Subjective     22  Intubated, sedated on the vent (Vent Day 1)  No seizure activity overnight  VSS, propofol stopped for hypotension, Versed started. Febrile overnight, max temp 100.8  Possible MRI today    22  Overnight has periods of desaturations with long recovery. Intubated and sedated. Precedex gtt started. Mechanical ventilation day 2. Enhanced droplet precautions. Repeat blood cultures     22  No episodes of desaturation overnight. Remains intubated on Precedex and Fentanyl gtt  Awake following commands. VSS afebrile. No pressor requirements. MRI last night No acute intracranial abnormality. 22  No acute events overnight. VSS Afebrile. On room air Oxygen saturation > 93%  Denies shortness of breath, N/V         Objective     TEMPERATURE:  Current - Temp: 99.7 °F (37.6 °C); Max - Temp  Av °F (37.2 °C)  Min: 97.7 °F (36.5 °C)  Max: 99.7 °F (37.6 °C)    RESPIRATIONS RANGE: Resp  Av.2  Min: 12  Max: 29    PULSE RANGE: Pulse  Av.7  Min: 54  Max: 85    BLOOD PRESSURE RANGE:  Systolic (53FIV), WQU:921 , Min:120 , ZGE:958   ; Diastolic (94KMX), NP, Min:59, Max:94      PULSE OXIMETRY RANGE: SpO2  Av %  Min: 93 %  Max: 100 %    I & O - 24hr:    Intake/Output Summary (Last 24 hours) at 8/3/2022 1016  Last data filed at 8/3/2022 7605  Gross per 24 hour   Intake 2351.39 ml   Output 3360 ml   Net -1008.61 ml     I/O last 3 completed shifts: In: 4237.2 [I.V.:3279.9; NG/GT:193; IV Piggyback:764.3]  Out: 4450 [Urine:4450] No intake/output data recorded. Weight change:     Physical Exam:  General Appearance:  Extubated follows commands NAD   HEENT: Head atraumatic Good dentition mucous membranes dry. Lung: Equal expansion. Symmetrical.  BBS Lungs clear diminished bilaterally in bases. Heart: S1/S2, RRR, pulses palpable, no edema  Abdomen: soft, non-tender, bowel sounds active in all 4 quadrants. Extremities:  No edema, no joint swelling or tenderness present. Musculokeletal: No joint swelling, no muscle tenderness. ROM normal in all joints of extremities. Neurologic: Mental status:   AAO x 3Follows commands. CHADWICK x 4 with equal strength. Speech clear and appropriate.        Medications     Continuous Infusions:   sodium chloride 75 mL/hr at 08/03/22 0169    sodium chloride Stopped (08/02/22 1227)     Scheduled Meds:   pantoprazole  40 mg Oral QAM AC    baricitinib  4 mg Oral Daily    docusate sodium  100 mg Oral Daily    sennosides  5 mL Oral Nightly    levETIRAcetam  500 mg IntraVENous Q12H    budesonide  0.5 mg Nebulization BID    Arformoterol Tartrate  15 mcg Nebulization BID    nicotine  1 patch TransDERmal Daily    sodium chloride flush  10 mL IntraVENous 2 times per day    enoxaparin  40 mg SubCUTAneous Daily    sodium chloride flush  5-40 mL IntraVENous 2 times per day    dexamethasone  20 mg IntraVENous Q24H    Followed by    Cami Mendez ON 8/5/2022] dexamethasone  10 mg IntraVENous Q24H    vitamin D  2,000 Units Oral Daily    ascorbic acid  500 mg Oral Daily    zinc sulfate  50 mg Oral Daily     PRN Meds: hydrALAZINE, ipratropium-albuterol, sodium chloride, promethazine **OR** ondansetron, polyethylene glycol, sodium chloride flush, [DISCONTINUED] acetaminophen **OR** acetaminophen  Nutrition:   NG/OG tube: NPO    Labs and Imaging Studies     CBC:   Recent Labs     08/01/22  0435 08/02/22  0506 08/03/22  0446   WBC 10.4 7.5 10.0   RBC 4.18 4.25 4.67   HGB 12.1* 12.2* 13.2   HCT 36.2* 36.6* 39.4   MCV 86.6 86.1 84.4   MCH 28.9 28.7 28.3   MCHC 33.4 33.3 33.5   RDW 13.2 13.0 13.1    136 187   MPV 10.5 10.9 10.9       BMP:    Recent Labs     08/01/22  0435 08/02/22  0506 08/03/22  0446    137 143   K 4.4 4.3 3.8  104 106   CO2 24 26 26   BUN 15 12 12   CREATININE 0.8 0.8 0.9   GLUCOSE 95 119* 94   CALCIUM 8.1* 8.2* 8.9   PROT 5.8* 6.1* 6.9   LABALBU 3.0* 3.0* 3.5   BILITOT <0.2 0.2 0.4   ALKPHOS 74 76 84   AST 18 16 20   ALT 22 18 23       LIVER PROFILE:   Recent Labs     08/01/22  0435 08/02/22  0506 08/03/22  0446   AST 18 16 20   ALT 22 18 23   BILITOT <0.2 0.2 0.4   ALKPHOS 74 76 84       PT/INR:   No results for input(s): PROTIME, INR in the last 72 hours. APTT:   No results for input(s): APTT in the last 72 hours. Fasting Lipid Panel:    No results found for: CHOL, TRIG, HDL    Cardiac Enzymes:    No results found for: CKTOTAL, CKMB, CKMBINDEX, TROPONINI    Notable Cultures:      Blood cultures   Blood Culture, Routine   Date Value Ref Range Status   08/01/2022 24 Hours no growth  Preliminary     Respiratory cultures No results found for: RESPCULTURE No results found for: LABGRAM  Urine   Urine Culture, Routine   Date Value Ref Range Status   07/30/2022 Growth not present  Final     Legionella No results found for: LABLEGI  C Diff PCR No results found for: CDIFPCR  Wound culture/abscess: No results for input(s): WNDABS in the last 72 hours. Tip culture:No results for input(s): CXCATHTIP in the last 72 hours.      Antibiotic  Days  Day started   Zosyn 5 7/30/22 - 08/03/22                           Oxygen:     Vent Information  Ventilator ID: [id number]    Additional Respiratory Assessments  Heart Rate: 81  Resp: 29  SpO2: 95 %  Position: Semi-Sosa's  Humidification Source: Heated wire  Humidification Temp: 37  Circuit Condensation: Drained       ABG     PH  7.42   PCO2   41   PO2  174   HCO3  26.4   Sat%  99   FIO2  40%   DATES 08/02/22       Lines:  Site  Day  Date inserted     TLC              PICC              Arterial line              Peripheral line   R FA/ R Wrist 3   7/30/22     HD cath            Urinary Catheter-Output (mL): 130 mL    Imaging Studies:    XR CHEST PORTABLE   Final Result Persistent patchy infiltrate right lung base. MRI BRAIN WO CONTRAST   Final Result   No acute intracranial abnormality visualized. XR CHEST PORTABLE   Final Result   Stable chest with persistent right lower lobe infiltrate. US DUP LOWER EXTREMITIES BILATERAL VENOUS   Final Result   Within the visualized vessels there is no evidence for deep venous   thrombosis               XR CHEST PORTABLE   Final Result   Improving aeration of right lower lobe         XR SHOULDER RIGHT (MIN 2 VIEWS)   Final Result   Ill-defined opacification seen within the right lung base. No evidence of   fracture or dislocation. Degenerative changes seen within the shoulder. XR HIP LEFT (2-3 VIEWS)   Final Result   Degenerative changes in the left hip with no evidence of acute bony   abnormality         XR CHEST PORTABLE   Final Result   Development of some mild increased markings at the left lung base with no   change in the airspace disease at the right lung base. Lines and tubes are   stable. XR ABDOMEN FOR NG/OG/NE TUBE PLACEMENT   Final Result   Adequately positioned nasogastric tube. XR CHEST PORTABLE   Final Result   Atelectasis/infiltrates in the right lower lobe and right middle lobe   concerning for pneumonia. CT HEAD WO CONTRAST   Final Result   No acute intracranial abnormality. Soft tissue density completely filling the nasal cavity and nasopharynx   probably inflammatory/secretions. Malignancy has to be excluded and direct   visualization recommended. CTA chest.      Comparison September 4, 2014      Findings      The heart and the great vessels are normal.  Nonenlarged mediastinal and   hilar lymph nodes are present. Trachea and major bronchi are patent. There   are no filling defects in the main pulmonary artery and the central branches. Says mucous plugging is identified in the lower lobe bronchi bilaterally.    There is atelectasis/infiltrates in the right lower lobe, right middle lobe   and to a lesser extent in the left lower lobe. There is no pleural effusion. Impression      There is no central pulmonary embolism or aortic dissection. Mucous plugging in the lower lobes bilaterally with atelectasis/infiltrates   in the lower lobes more on the right side and right middle lobe concerning   for pneumonia. CT abdomen pelvis. The liver is of normal architecture. Gallbladder is partially distended. Spleen, pancreas, and the adrenals are normal.  There is heterogeneous   enhancement of the kidneys with the areas of decreased attenuation concerning   for pyelonephritis. Dilated fluid-filled small bowel loops are noted likely   ileus/enteritis with small bowel loops measuring up to 2 cm. Pelvis. Bladder is partially distended with the Hampton catheter and the wall   thickening. Colon is normal.  The appendix is partially identified and   appears normal.      Impression      Heterogeneous enhancement of the kidneys concerning for multifocal   pyelonephritis. There is also thickening of the urinary bladder. Please   correlate with urinalysis. Dilated fluid-filled small bowel loops likely ileus/enteritis. CTA PULMONARY W CONTRAST   Final Result   No acute intracranial abnormality. Soft tissue density completely filling the nasal cavity and nasopharynx   probably inflammatory/secretions. Malignancy has to be excluded and direct   visualization recommended. CTA chest.      Comparison September 4, 2014      Findings      The heart and the great vessels are normal.  Nonenlarged mediastinal and   hilar lymph nodes are present. Trachea and major bronchi are patent. There   are no filling defects in the main pulmonary artery and the central branches. Says mucous plugging is identified in the lower lobe bronchi bilaterally.    There is atelectasis/infiltrates in the right lower lobe, right middle lobe   and to a lesser extent in the left lower lobe. There is no pleural effusion. Impression      There is no central pulmonary embolism or aortic dissection. Mucous plugging in the lower lobes bilaterally with atelectasis/infiltrates   in the lower lobes more on the right side and right middle lobe concerning   for pneumonia. CT abdomen pelvis. The liver is of normal architecture. Gallbladder is partially distended. Spleen, pancreas, and the adrenals are normal.  There is heterogeneous   enhancement of the kidneys with the areas of decreased attenuation concerning   for pyelonephritis. Dilated fluid-filled small bowel loops are noted likely   ileus/enteritis with small bowel loops measuring up to 2 cm. Pelvis. Bladder is partially distended with the Hampton catheter and the wall   thickening. Colon is normal.  The appendix is partially identified and   appears normal.      Impression      Heterogeneous enhancement of the kidneys concerning for multifocal   pyelonephritis. There is also thickening of the urinary bladder. Please   correlate with urinalysis. Dilated fluid-filled small bowel loops likely ileus/enteritis. CT ABDOMEN PELVIS W IV CONTRAST Additional Contrast? None   Final Result   No acute intracranial abnormality. Soft tissue density completely filling the nasal cavity and nasopharynx   probably inflammatory/secretions. Malignancy has to be excluded and direct   visualization recommended. CTA chest.      Comparison September 4, 2014      Findings      The heart and the great vessels are normal.  Nonenlarged mediastinal and   hilar lymph nodes are present. Trachea and major bronchi are patent. There   are no filling defects in the main pulmonary artery and the central branches. Says mucous plugging is identified in the lower lobe bronchi bilaterally.    There is atelectasis/infiltrates in the right lower lobe, right middle lobe   and to a lesser extent in the left lower lobe. There is no pleural effusion. Impression      There is no central pulmonary embolism or aortic dissection. Mucous plugging in the lower lobes bilaterally with atelectasis/infiltrates   in the lower lobes more on the right side and right middle lobe concerning   for pneumonia. CT abdomen pelvis. The liver is of normal architecture. Gallbladder is partially distended. Spleen, pancreas, and the adrenals are normal.  There is heterogeneous   enhancement of the kidneys with the areas of decreased attenuation concerning   for pyelonephritis. Dilated fluid-filled small bowel loops are noted likely   ileus/enteritis with small bowel loops measuring up to 2 cm. Pelvis. Bladder is partially distended with the Hampton catheter and the wall   thickening. Colon is normal.  The appendix is partially identified and   appears normal.      Impression      Heterogeneous enhancement of the kidneys concerning for multifocal   pyelonephritis. There is also thickening of the urinary bladder. Please   correlate with urinalysis. Dilated fluid-filled small bowel loops likely ileus/enteritis. XR CHEST PORTABLE    (Results Pending)   XR CHEST PORTABLE    (Results Pending)        EKG: Rhythm Strip: normal sinus rhythm, unchanged from previous tracings. APRN- CNP Assessment and Plan   Assessment and Plan:  Acute Metabolic Encephalopathy, Resolved following commands. Seizure disorder, question seizure   -AMS in the ED, possible seizure activity, CT no acute process  -Discontinue Levetiracetam Neurology recommendations   -Neurology consulted, appreciate input.  - EEG no seizure epiltiform    - MRI No intracranial abnormalities.    -Patient is following commands   -Discontinue seizure precautions    Acute hypoxic respiratory failure, 2/2 COVID-19 vs. PNA  COVID-19  Barcitinib 4 mg x 14 doses.  Started 08/01/22  Hx of Tobacco use  Hx of Marijuana use   -Procalcitonin 0.06, Lactic acid 0.8, CRP 0.3   -Respiratory culture - moderate mixed bacterial morphotype on gram stain. -CXR daily, CTA negative for PE, atelectatsis/infiltrates in the lower lobes more on the right side and right middle lobe concerns for PNA  -Continue brovana, pulmicort  - Duo Nebs prn   -Continue Decadron  -Continue zinc, Vit D and ascorbic acid   -Wean FiO2 and keep pulse ox >92%  - Discontinued  Zosyn  -Every 3 days. CXR  -Nicotine patch        Ileus/Enteritis per CT resolving BM 08/01/22   - Passed Bedside swallow   - Diet as tolerated. -General Surgery consulted, appreciate input signed off.    - Continue PPI    UTI    -UA shows trace leukocyte esterase with rare bacteria   -Urine culture no growth. -Strep and legionella negative   -Blood cultures culture Micrococcus species      Hx of right shoulder supraspinatus tear-recently presented to the ED on 7/6/22 for right should pain   -Will consult orthopedics to follow up while inpatient. -MRI from 2019 shows full thickness tear    Hx of Bimalleolar fracture of left ankle s/p internal fixation (2014)    Electrolytes/Fluids/Nutrition: Replace as needed. Okay to transfer to floor from an ICU standpoint. Plan of care discussed in multidisciplinary rounds. Dr. Anselmo Barrera is the collaborating physician. Caty Martinez, [address] [facility]. [place]  Department of Pulmonary, Critical Care and Sleep Medicine  [address] [facility]  Department of Internal Medicine      During multidisciplinary team rounds Phoenix Jenkins is a 49 y.o. male was seen, examined and discussed. This is confirmation that I have personally seen and examined the patient and that the key elements of the encounter were performed by me (> 85 % time). The medications & laboratory data was discussed and adjusted where necessary.  The radiographic images were reviewed or with radiologist or consultant if felt dis-concordant with the exam or history. The above findings were corroborated, plans confirmed and changes made if needed. Family is updated at the bedside as available. Key issues of the case were discussed among consultants. Patient extubated and on room air. OK to transfer to floor.      Yamileth Corona DO

## 2022-08-03 NOTE — PROGRESS NOTES
[address] [address]                                                               Patient Name: Sapna Tang  MRN: [id number]  : 1975  Room: [address] [place], [place] [address]    Evaluating OT: Joaquin Garcia, CONSTANZAD,  OTR/L; [id number]    Referring Provider: SEVERINO Anderson CNP   Specific Provider Orders/Date: OT eval and treat (22)       Diagnosis: Acute respiratory failure ([address] [J96.00]  Acute respiratory failure with hypoxia ([address] [J96.01]  Aspiration into airway, initial encounter [T17.908A]  Altered mental status, unspecified altered mental status type [R41.82]  Aspiration pneumonia of right lower lobe, unspecified aspiration pneumonia type ([address] [J69.0]  [place] coma scale total score 3-8, at arrival to emergency department [facility]) [N84.4461]  COVID-19 [U07.1]  Acute hypoxemic respiratory failure due to COVID-19 ([address] [U07.1, J96.01]     Reason for admission: Pt admitted with acute respiratory failure, SOB/chest pain. Surgery/Procedures: Intubated ; Extubated      Pertinent Medical History:    Past Medical History:   Diagnosis Date    Bimalleolar fracture of left ankle 2014        *Precautions:  Fall Risk, WBAT RUE, WBAT LLE, L hip OA, seizure precautions, NPO, Droplet+/COVID+    Assessment of current deficits   [x] Functional mobility  [x]ADLs  [x] Strength               [x]Cognition   [x] Functional transfers   [x] IADLs         [x] Safety Awareness   [x]Endurance   [] Fine Coordination        [] ROM     [] Vision/perception   []Sensation    []Gross Motor Coordination [x] Balance   [] Delirium                  []Motor Control     [] Communication    OT PLAN OF CARE   OT POC based on physician orders, patient diagnosis and results of clinical assessment.        Frequency/Duration: 1-3 days/wk for 1-2 weeks PRN    Specific OT Treatment Interventions to include:   * Instruction/training on adapted ADL techniques and AE recommendations to increase functional independence within precautions       * Training on energy conservation strategies, correct breathing pattern and techniques to improve independence/tolerance for self-care routine  * Functional transfer/mobility training/DME recommendations for increased independence, safety, and fall prevention  * Patient/Family education to increase follow through with safety techniques and functional independence  * Recommendation of environmental modifications for increased safety with functional transfers/mobility and ADLs  * Cognitive retraining/development of therapeutic activities to improve problem solving, judgement, memory, and attention for increased safety/participation in ADL/IADL tasks  * Sensory re-education to improve body/limb awareness, maintain/improve skin integrity, and improve hand/UE motor function  * Visual-perceptual training to improve environmental scanning, visual attention/focus, and oculomotor skills for increased safety/independence with functional transfers/mobility and ADLs  * Splinting/positioning for increased function, prevention of contractures, and improve skin integrity  * Therapeutic exercise to improve motor endurance, ROM, and functional strength for ADLs/functional transfers  * Therapeutic activities to facilitate/challenge dynamic balance, stand tolerance for increased safety and independence with ADLs  * Therapeutic activities to facilitate gross/fine motor skills for increased independence with ADLs  * Neuro-muscular re-education: facilitation of righting/equilibrium reactions, midline orientation, scapular stability/mobility, normalization of muscle tone, and facilitation of volitional active controled movement  * Positioning to improve skin integrity, interaction with environment and functional independence  * Delirium prevention/treatment  * Manual techniques for edema management    Recommended Adaptive Equipment: TBD     Home Living: Pt lives with cousin  in a 2 story home with \"a lot\" of step(s) to enter and unknown rail(s); bed/bath on unknown floor. Bathroom setup: [address] owned: None    Prior Level of Function: Ind with ADLs; Ind with IADLs. No AD for functional mobility. Driving: Unknown  Occupation: Unknown    Pain Level: pt c/o 0/10 pain this session    Cognition: A&O: 4/4; Follows 2 step commands requiring encouragment   Memory: G-   Comprehension: G   Problem solving: G-   Judgement/safety: F+               Communication skills:            Vision: WFL               Glasses: Yes; declined to wear them for session                                                  Hearing: WFL     RASS: -1  CAM-ICU: (-) Delirium    UE Assessment:  Hand Dominance: Right [x]  Left []     ROM Strength STM goal: PRN   RUE  Declined to complete AROM assessment d/t pain from full R supraspinatus tear. WFL  For light ADLs  Functionally Assessed WFL for ADL tasks. [facility] WFL        Sensation: No c/o numbness/tingling in extremities. Tone: WNL   Edema: Unremarkable     Functional Assessment:  AM-PAC Daily Activity Raw Score: 17/24   Initial Eval Status  Date: 8/3/22 Treatment Status  Date:  STGs = LTGs  Time frame: 7-14 days   Feeding Set-up Assist   To drink from cup                      Ind       Grooming Min A  While standing at sink to wash face. Ind        UB dressing/bathing Mod A  Limited by ROM/pain in RUE. Mod I        LB dressing/bathing Min A  To don pants while seated at EOB. Assist to steady. Ind        Toileting Min A                      Ind     Bed Mobility  Supine to sit:   Min A    Sit to supine:   NT  Pt seated in chair upon exit.                         Ind     Functional Transfers Sit to stand:   Min A    Stand to sit:   Min A    Stand Pivot:   NT    Pt reported dizziness in standing, alleviated with upright sitting. Ind     Functional Mobility Min A  For short distance in room   Chair<>sink  Pt requesting to sit upon completion of  ADLs. Ind        Balance Sitting:     Static: S    Dynamic:SBA  Standing: Min A  Sitting:     Static:  Ind    Dynamic:Ind  Standing: Ind                   Endurance/Activity Tolerance   fair tolerance with light activity. WFL  For heavy ADL/IADL tasks. Visual/  Perceptual   WFL                     Vitals:   HR at rest: 91 bpm HR at end of session: 84 bpm   Spo2 at rest: 96% Spo2 at end of session 98%   BP at rest: 126/65 mmHg BP at end of session 105/67 mmHg       Treatment: OT treatment provided this date includes:     Instruction/training on safety and adapted techniques for completion of ADLs: to increase independence and safety in self-care. Instruction/training on safe bed mobility/functional mobility/transfer techniques: with focus on safety, body mechanics, and precautions  Skilled Monitoring of Vitals: to include BP, spO2, and HR throughout session to maximize safety. Sitting/Standing Balance/Tolerance: Pt standing at sink for ~5 min to increase balance and activity tolerance during ADLs and facilitate proper posture and positioning. Therapeutic activity: to challenge dynamic sitting/standing balance and endurance to promote safety during ADL tasks and functional transfers and mobility. Delirium Prevention: Environmental and sensory modifications assessed and implemented to decrease ICU acquired delirium and to improve overall orientation, mentation and pt interaction with family/staff. Line management and environmental modifications made prior to and end of session to ensure patient safety and to increase efficiency of session. Skilled monitoring of HR, O2 saturation, blood pressure and patient's response to activity performed throughout session.     Comments: Pt case discussed in rounds, OK from RN to see patient. Upon arrival, patient supine in bed. Pt agreeable to participate in therapy session. Pt demo fair tolerance with good understanding of education/techniques. Pt fatiguing easily this session, requesting to return to sitting position following light activity. Pt agreeable and cooperative throughout session. At end of session, patient seated upright in chair with call light within reach, all lines and tubes intact. Pt instructed on use of call light for assistance and fall prevention. Nursing notified of patient positioning. Patient presents with decreased ROM/strength, activity tolerance, dynamic balance, functional mobility limiting completion of ADLs and safety. Pt can benefit from continued skilled OT services to increase safety, functional independence and quality of life. Rehab Potential: Good for established goals    Patient / Family Goal: to return to PLOF    Patient and/or family were instructed/educated on diagnosis, prognosis/goals and plan of care. Patient demonstrated good understanding. Evaluation Complexity: Moderate     History: Expanded chart review of consults, imaging, and psychosocial history related to current functional performance. Exam: 5+ performance deficits identified limiting functional independence and safe return home   Assistance/Modification: Min/mod assistance or modifications required to perform tasks. May have comorbidities that affect occupational performance. [] Malnutrition indicators have been identified and nursing has been notified to ensure a dietitian consult is ordered.       Time In:10:45a             Time Out: 11:17a         Total Treatment time: 17 min   Min Units   OT Eval Low 02846     OT Eval Medium 97166 X 1   OT Eval High 58239     OT Re-Eval [id number]     Therapeutic Ex 35002     Therapeutic Activities 52501     ADL/Self Care 50892 17 1   Orthotic Management 83555     Neuro Re-Ed 23222     Non-Billable Time Evaluation time includes thorough review of current medical information, gathering information on past medical history/social history and prior level of function, completion of standardized testing/informal observation of tasks, assessment of data and development of POC/Goals.      Morgan Salinas, OTD,  OTR/L; [id number]

## 2022-08-03 NOTE — PROGRESS NOTES
Attempted bedside swallow exam, patient continues to refuse water at this time. Held PO Senokot at this time.

## 2022-08-03 NOTE — PROGRESS NOTES
Hospitalist Progress Note      Synopsis: Patient admitted on 7/30/2022 with chest pain and shortness of breath. Patient became unresponsive and developed acute hypoxic respiratory failure. He was placed on an non re-breather and ultimately intubated for airway protection. CTA of the chest showed no PE but possible bilateral pneumonia vs atelectasis worse on the right. Patient started on Zosyn for aspiration Pneumonia. He did test positive for COVID. Started on decadron, zinc, and vitamin C. Given concern for possible seizure. Patient loaded with keppra and neurology consulted. CTAP showed possible pyelonephritis and enteritis/ileus. General surgery consulted and patient is on zosyn and vancomycin. EEG showed moderate encephalopathy. General surgery evaluated the patient and started tube feeds. Subjective  Patient seen at bedside in the ICU. Wakes up and follows commands though appears to be fatigued. Does not engage in a conversation and goes back to sleep mentions that he is tired    Exam:  Vitals:    08/03/22 1000   BP: 126/65   Pulse: 81   Resp: 29   Temp:    SpO2: 95%     General appearance:appears lethargic, appears stated age   Respiratory: Normal respiratory effort, currently on room air  Cardiovascular: Regular rate and rhythm, S1-S2 heard, no added sounds  Musculoskeletal: No clubbing cyanosis or edema noted bilaterally on the extremities  Skin:  No rashes    Neurologic: Alert, following commands, generalized weakness-motor strength 5/5 in the upper and lower extremities bilaterally    ASSESSMENT AND PLAN     Acute Hypoxic Respiratory Failure  -Patient extubated -8/2/22 and currently on room air saturating well  -Likely multifactorial with COVID, Aspiration Pneumonia, and seizure ruled out per neurology    Aspiration Pneumonia  Patient being covered with Zosyn and vancomycin.   Which has been discontinued at this time  XR chest -8/2/22 shows persistent infiltrate right lung base    COVID 19  -Patient is on decadron, vitamin C, and Zinc . Baricitinib for 2 weeks from 8/2/22-8/15/22  -Steroid taper in place    Seizure  -Patient loaded with keppra   -Neurology consulted-Due to negative testing, no history of seizures neurology recommends discontinuing Keppra  -MRI brain -8/1/22 showed no acute findings  - EEG showed moderate encephalopathy    Enteritis/Ileus  General surgery consulted-cleared for diet  Was on NG tube feedings until now and will be switched to oral diet when passes swallow study. Patient currently refusing at this time    Rotator cuff tear   -Outpatient follow up as per ortho.      Disposition[place] [facility]final disposition still to be determined pending clinical improvement      Medications:  Reviewed    Infusion Medications    sodium chloride 75 mL/hr at 08/03/22 0213    sodium chloride Stopped (08/02/22 1227)     Scheduled Medications    pantoprazole  40 mg Oral QAM AC    baricitinib  4 mg Oral Daily    docusate sodium  100 mg Oral Daily    sennosides  5 mL Oral Nightly    budesonide  0.5 mg Nebulization BID    Arformoterol Tartrate  15 mcg Nebulization BID    nicotine  1 patch TransDERmal Daily    sodium chloride flush  10 mL IntraVENous 2 times per day    enoxaparin  40 mg SubCUTAneous Daily    sodium chloride flush  5-40 mL IntraVENous 2 times per day    dexamethasone  20 mg IntraVENous Q24H    Followed by    Earnest Bowling ON 8/5/2022] dexamethasone  10 mg IntraVENous Q24H    vitamin D  2,000 Units Oral Daily    ascorbic acid  500 mg Oral Daily    zinc sulfate  50 mg Oral Daily     PRN Meds: hydrALAZINE, ipratropium-albuterol, sodium chloride, promethazine **OR** ondansetron, polyethylene glycol, sodium chloride flush, [DISCONTINUED] acetaminophen **OR** acetaminophen    I/O    Intake/Output Summary (Last 24 hours) at 8/3/2022 1043  Last data filed at 8/3/2022 0830  Gross per 24 hour   Intake 2351.39 ml   Output 3360 ml   Net -1008.61 ml       Labs:   Recent Labs     08/01/22  0121 08/02/22  0506 08/03/22  0446   WBC 10.4 7.5 10.0   HGB 12.1* 12.2* 13.2   HCT 36.2* 36.6* 39.4    136 187       Recent Labs     08/01/22  0435 08/02/22  0506 08/03/22  0446    137 143   K 4.4 4.3 3.8    104 106   CO2 24 26 26   BUN 15 12 12   CREATININE 0.8 0.8 0.9   CALCIUM 8.1* 8.2* 8.9   PHOS 2.4* 2.8 3.0       Recent Labs     08/01/22 0435 08/02/22  0506 08/03/22  0446   PROT 5.8* 6.1* 6.9   ALKPHOS 74 76 84   ALT 22 18 23   AST 18 16 20   BILITOT <0.2 0.2 0.4       No results for input(s): INR in the last 72 hours. No results for input(s): Airam Cardonaka in the last 72 hours. Chronic labs:  Lab Results   Component Value Date    INR 1.4 07/31/2022       Radiology:  Imaging studies reviewed today.    +++++++++++++++++++++++++++++++++++++++++++++++++  Silvana Dorsey MD   [facility].  +++++++++++++++++++++++++++++++++++++++++++++++++  NOTE: This report was transcribed using voice recognition software. Every effort was made to ensure accuracy; however, inadvertent computerized transcription errors may be present.

## 2022-08-03 NOTE — PLAN OF CARE
Problem: Nutrition Deficit:  Goal: Optimize nutritional status  Outcome: Not Progressing     Problem: Discharge Planning  Goal: Discharge to home or other facility with appropriate resources  8/2/2022 2057 by Arvind David RN  Outcome: Progressing  Flowsheets (Taken 8/2/2022 2000)  Discharge to home or other facility with appropriate resources: Identify barriers to discharge with patient and caregiver    Problem: Pain  Goal: Verbalizes/displays adequate comfort level or baseline comfort level  8/2/2022 2057 by Arvind David RN  Outcome: Progressing  Flowsheets (Taken 8/2/2022 2000)  Verbalizes/displays adequate comfort level or baseline comfort level: Encourage patient to monitor pain and request assistance     Problem: Neurosensory - Adult  Goal: Achieves stable or improved neurological status  8/2/2022 2057 by Arvind David RN  Outcome: Progressing  Flowsheets (Taken 8/2/2022 2000)  Achieves stable or improved neurological status: Assess for and report changes in neurological status     Problem: Neurosensory - Adult  Goal: Absence of seizures  8/2/2022 2057 by Arvind David RN  Outcome: Progressing  Flowsheets (Taken 8/2/2022 2000)  Absence of seizures: Monitor for seizure activity.   If seizure occurs, document type and location of movements and any associated apnea     Problem: Neurosensory - Adult  Goal: Remains free of injury related to seizures activity  8/2/2022 2057 by Arvind David RN  Outcome: Progressing  Flowsheets (Taken 8/2/2022 2000)  Remains free of injury related to seizure activity: Maintain airway, patient safety  and administer oxygen as ordered     Problem: Cardiovascular - Adult  Goal: Maintains optimal cardiac output and hemodynamic stability  Outcome: Progressing  Flowsheets (Taken 8/2/2022 2000)  Maintains optimal cardiac output and hemodynamic stability: Monitor blood pressure and heart rate     Problem: Cardiovascular - Adult  Goal: Absence of cardiac dysrhythmias or at baseline  Outcome: Progressing  Flowsheets (Taken 8/2/2022 2000)  Absence of cardiac dysrhythmias or at baseline: Monitor cardiac rate and rhythm     Problem: Skin/Tissue Integrity - Adult  Goal: Skin integrity remains intact  Outcome: Progressing  Flowsheets (Taken 8/2/2022 2000)  Skin Integrity Remains Intact: Monitor for areas of redness and/or skin breakdown     Problem: Skin/Tissue Integrity - Adult  Goal: Incisions, wounds, or drain sites healing without S/S of infection  Outcome: Progressing  Flowsheets (Taken 8/2/2022 2000)  Incisions, Wounds, or Drain Sites Healing Without Sign and Symptoms of Infection: TWICE DAILY: Assess and document skin integrity     Problem: Skin/Tissue Integrity - Adult  Goal: Oral mucous membranes remain intact  Outcome: Progressing  Flowsheets (Taken 8/2/2022 2000)  Oral Mucous Membranes Remain Intact: Assess oral mucosa and hygiene practices     Problem: Musculoskeletal - Adult  Goal: Return mobility to safest level of function  Outcome: Progressing  Flowsheets (Taken 8/2/2022 2000)  Return Mobility to Safest Level of Function: Assess patient stability and activity tolerance for standing, transferring and ambulating with or without assistive devices     Problem: Musculoskeletal - Adult  Goal: Maintain proper alignment of affected body part  Outcome: Progressing  Flowsheets (Taken 8/2/2022 2000)  Maintain proper alignment of affected body part: Support and protect limb and body alignment per provider's orders     Problem: Musculoskeletal - Adult  Goal: Return ADL status to a safe level of function  Outcome: Progressing  Flowsheets (Taken 8/2/2022 2000)  Return ADL Status to a Safe Level of Function: Administer medication as ordered     Problem: Gastrointestinal - Adult  Goal: Minimal or absence of nausea and vomiting  Outcome: Progressing  Flowsheets (Taken 8/2/2022 2000)  Minimal or absence of nausea and vomiting: Administer IV fluids as ordered to ensure adequate hydration Problem: Gastrointestinal - Adult  Goal: Maintains or returns to baseline bowel function  Outcome: Progressing  Flowsheets (Taken 8/2/2022 2000)  Maintains or returns to baseline bowel function: Assess bowel function     Problem: Gastrointestinal - Adult  Goal: Maintains adequate nutritional intake  Outcome: Progressing  Flowsheets (Taken 8/2/2022 2000)  Maintains adequate nutritional intake: Monitor intake and output, weight and lab values     Problem: Gastrointestinal - Adult  Goal: Establish and maintain optimal ostomy function  Outcome: Progressing  Flowsheets (Taken 8/2/2022 2000)  Establish and maintain optimal ostomy function: Administer IV fluids and TPN as ordered     Problem: Genitourinary - Adult  Goal: Absence of urinary retention  Outcome: Progressing     Problem: Genitourinary - Adult  Goal: Urinary catheter remains patent  Outcome: Progressing  Flowsheets (Taken 8/2/2022 2000)  Urinary catheter remains patent: Assess patency of urinary catheter     Problem: Infection - Adult  Goal: Absence of infection at discharge  Outcome: Progressing  Flowsheets (Taken 8/2/2022 2000)  Absence of infection at discharge: Assess and monitor for signs and symptoms of infection     Problem: Infection - Adult  Goal: Absence of infection during hospitalization  Outcome: Progressing  Flowsheets (Taken 8/2/2022 2000)  Absence of infection during hospitalization: Monitor lab/diagnostic results     Problem: Infection - Adult  Goal: Absence of fever/infection during anticipated neutropenic period  Outcome: Progressing  Flowsheets (Taken 8/2/2022 2000)  Absence of fever/infection during anticipated neutropenic period: Monitor white blood cell count     Problem: Metabolic/Fluid and Electrolytes - Adult  Goal: Electrolytes maintained within normal limits  Outcome: Progressing  Flowsheets (Taken 8/2/2022 2000)  Electrolytes maintained within normal limits: Administer electrolyte replacement as ordered     Problem: Metabolic/Fluid and Electrolytes - Adult  Goal: Hemodynamic stability and optimal renal function maintained  Outcome: Progressing  Flowsheets (Taken 8/2/2022 2000)  Hemodynamic stability and optimal renal function maintained: Monitor intake, output and patient weight     Problem: Metabolic/Fluid and Electrolytes - Adult  Goal: Glucose maintained within prescribed range  Outcome: Progressing  Flowsheets (Taken 8/2/2022 2000)  Glucose maintained within prescribed range: Assess for signs and symptoms of hyperglycemia and hypoglycemia     Problem: Hematologic - Adult  Goal: Maintains hematologic stability  Outcome: Progressing  Flowsheets (Taken 8/2/2022 2000)  Maintains hematologic stability: Assess for signs and symptoms of bleeding or hemorrhage     Problem: ABCDS Injury Assessment  Goal: Absence of physical injury  Outcome: Progressing  Flowsheets (Taken 8/2/2022 2000)  Absence of Physical Injury: Implement safety measures based on patient assessment     Problem: Safety - Adult  Goal: Free from fall injury  Outcome: Progressing  Flowsheets (Taken 8/2/2022 2000)  Free From Fall Injury: Instruct family/caregiver on patient safety     Problem: Respiratory - Adult  Goal: Achieves optimal ventilation and oxygenation  8/2/2022 2057 by Osvaldo Griggs RN  Outcome: Completed  Flowsheets (Taken 8/2/2022 2000)  Achieves optimal ventilation and oxygenation: Assess for changes in respiratory status

## 2022-08-03 NOTE — PROGRESS NOTES
Physical Therapy    Physical Therapy Initial Assessment     Name: Perry Varela  : 1975  MRN: [id number]      Date of Service: 8/3/2022    Evaluating PT:  Tato Javier PT, DPT  [id number]     Room #:  1338/5366-Q  Diagnosis:  Acute respiratory failure ([address] [J96.00]  Acute respiratory failure with hypoxia ([address] [J96.01]  Aspiration into airway, initial encounter [X18.625E]  Altered mental status, unspecified altered mental status type [R41.82]  Aspiration pneumonia of right lower lobe, unspecified aspiration pneumonia type ([address] [J69.0]  Nasreen coma scale total score 3-8, at arrival to emergency department [facility]) [Q85.5858]  COVID-19 [U07.1]  Acute hypoxemic respiratory failure due to COVID-19 ([address] [U07.1, J96.01]  PMHx/PSHx:  Bimalleolar fx and ORIF of L ankle (2014), R supraspinatus tear (2019)  Procedure/Surgery: intubation on , extubation on 22  Precautions:  Falls, Droplet Plus isolation, COVID, seizures, O2, catheter  Equipment Needs:  TBD as pt progresses    SUBJECTIVE:    Pt lives with cousin in a 2 story home with \"a lot\" of stairs and B rails to enter. Bedroom and bathroom are on the 1st level. Pt ambulated with no AD PTA. OBJECTIVE:   Initial Evaluation  Date: 8/3/22 Treatment Short Term/ Long Term   Goals   AM-PAC 6 Clicks 80/63     Was pt agreeable to Eval/treatment? Yes     Does pt have pain?  None reported     Bed Mobility  Rolling: NT  Supine to sit: Poli  Sit to supine: NT  Scooting: Poli  Rolling: indep  Supine to sit: indep  Sit to supine: indep  Scooting: indep   Transfers Sit to stand: Poli  Stand to sit: Poli  Stand pivot: Poli with HHA  Sit to stand: indep  Stand to sit: indep  Stand pivot: Phillip with LRAD   Ambulation    2x15 feet with HHA Poli  >100 feet with LRAD Phillip   Stair negotiation: ascended and descended NT  10 steps with 1 rail Phillip   ROM BUE:  See OT eval  BLE:  WFL     Strength BUE:  See OT eval  BLE:  Grossly and functionally at least 3+/5  BLE 5/5 Balance Sitting EOB:  SBA  Dynamic Standing:  Poli with HHA  Sitting EOB:  SBA  Dynamic Standing:  Phillip with LRAD     Pt is A & O x3  RASS:  0  CAM-ICU:  Negative  Sensation:  Pt denies numbness and tingling to extremities  Edema:  Unremarkable    Vitals:  Blood Pressure at rest 126/65 mmHg  Blood Pressure post session 105/67 mmHg   Heart Rate at rest 91 bpm  Heart Rate post session 84 bpm    SPO2 at rest 96% on RA SPO2 post session 98% on RA         Functional Status Score-Intensive Care Unit (FSS-ICU)   Rolling --/7   Supine to sit transfer 4/7   Unsupported sitting  5/7   Sit to stand transfers 4/7   Ambulation 1/7   Total  14/35       Patient education  Pt educated on PT role, safety during functional mobility, and upright positioning throughout the day. Patient response to education:   Pt verbalized understanding Pt demonstrated skill Pt requires further education in this area   Yes Yes Reinforce     ASSESSMENT:    Conditions Requiring Skilled Therapeutic Intervention:    []Decreased strength     []Decreased ROM  [x]Decreased functional mobility  [x]Decreased balance   [x]Decreased endurance   []Decreased posture  []Decreased sensation  []Decreased coordination   []Decreased vision  []Decreased safety awareness   []Increased pain       Comments:  Pt received supine and agreeable to PT evaluation with OT collaboration. Pt cleared for participation by RN prior to session. Vitals monitored during session. PT assisted pt at trunk during transition to EOB. Pt reported mild lightheadedness with the transfer which did not improve or worsen throughout the session. PT then assisted pt in a transfer to bedside chair in which the pt required verbal cueing to avoid sitting until BLE were touching the surface and UE reaching for the armrest for safety. Pt sat in chair for ~5 min with no change in status before ambulation within the room. PT then assisted pt in gait to the sink.  Pt demonstrated a narrow ALVAREZ and was verbally cued for wider step length and upright posture. After standing at sink for ~5 min pt reported BLE fatigue and was assisted in ambulation back to the chair. Pt left seated in chair with call button in reach, lines attached, and needs met. Nursing was notified of pt's performance. Treatment:  Patient practiced and was instructed in the following treatment:    Bed mobility training - pt given verbal and tactile cues to facilitate proper sequencing and safety during rolling and supine>sit as well as provided with physical assistance to complete task   STS and pivot transfer training - pt educated on proper hand and foot placement, safety and sequencing, and use of HHA to safely complete sit<>stand and pivot transfers with physical assistance to complete task safely   Gait training- pt was given verbal and tactile cues to facilitate safe mobility during ambulation as well as provided with physical assistance  Skilled positioning - Pt placed in the chair position with pillows utilized to facilitate upright posture, joint and skin integrity, and interaction with environment    Pt's/ family goals   1. None stated    Prognosis is good for reaching above PT goals. Patient and or family understand(s) diagnosis, prognosis, and plan of care.   Yes    PHYSICAL THERAPY PLAN OF CARE:    PT POC is established based on physician order and patient diagnosis     Referring provider/PT Order:  SEVERINO Ashley - CNP  Diagnosis:  Acute respiratory failure ([address] [J96.00]  Acute respiratory failure with hypoxia ([address] [J96.01]  Aspiration into airway, initial encounter [T17.248A]  Altered mental status, unspecified altered mental status type [R41.82]  Aspiration pneumonia of right lower lobe, unspecified aspiration pneumonia type ([address] [J69.0]  Nasreen coma scale total score 3-8, at arrival to emergency department [facility]) [R54.3056]  COVID-19 [U07.1]  Acute hypoxemic respiratory failure due to COVID-19 ([address] [U07.1, J96.01]  Specific instructions for next treatment:  Transfer and gait training    Current Treatment Recommendations:     [x] Strengthening to improve independence with functional mobility   [] ROM to improve independence with functional mobility   [x] Balance Training to improve static/dynamic balance and to reduce fall risk  [x] Endurance Training to improve activity tolerance during functional mobility   [x] Transfer Training to improve safety and independence with all functional transfers   [x] Gait Training to improve gait mechanics, endurance and asses need for appropriate assistive device  [] Stair Training in preparation for safe discharge home and/or into the community   [] Positioning to prevent skin breakdown and contractures  [] Safety and Education Training   [x] Patient/Caregiver Education   [] HEP  [] Other     PT long term treatment goals are located in above grid    Frequency of treatments: 2-5x/week x 1-2 weeks. Time in  10:45  Time out  11:15    Total Treatment Time  20 minutes     Evaluation Time includes thorough review of current medical information, gathering information on past medical history/social history and prior level of function, completion of standardized testing/informal observation of tasks, assessment of data and education on plan of care and goals.     CPT codes:  [x] Low Complexity PT evaluation 14938  [] Moderate Complexity PT evaluation 87049  [] High Complexity PT evaluation 39808  [] PT Re-evaluation 35455  [] Gait training 09465 -- minutes  [] Manual therapy 52433 -- minutes  [x] Therapeutic activities 42681 15 minutes  [] Therapeutic exercises 33761 -- minutes  [] Neuromuscular reeducation 11046 -- minutes     Marzena Blakely, SPT    Brock Spencer, PT, DPT  [id number]

## 2022-08-04 VITALS
DIASTOLIC BLOOD PRESSURE: 69 MMHG | RESPIRATION RATE: 18 BRPM | TEMPERATURE: 97.5 F | SYSTOLIC BLOOD PRESSURE: 120 MMHG | HEART RATE: 62 BPM | WEIGHT: 149.69 LBS | HEIGHT: 73 IN | BODY MASS INDEX: 19.84 KG/M2 | OXYGEN SATURATION: 98 %

## 2022-08-04 LAB
ALBUMIN SERPL-MCNC: 3.1 G/DL (ref 3.5–5.2)
ALP BLD-CCNC: 73 U/L (ref 40–129)
ALT SERPL-CCNC: 18 U/L (ref 0–40)
ANION GAP SERPL CALCULATED.3IONS-SCNC: 12 MMOL/L (ref 7–16)
AST SERPL-CCNC: 15 U/L (ref 0–39)
BASOPHILS ABSOLUTE: 0.01 E9/L (ref 0–0.2)
BASOPHILS RELATIVE PERCENT: 0.1 % (ref 0–2)
BILIRUB SERPL-MCNC: 0.3 MG/DL (ref 0–1.2)
BUN BLDV-MCNC: 16 MG/DL (ref 6–20)
CALCIUM IONIZED: 1.22 MMOL/L (ref 1.15–1.33)
CALCIUM SERPL-MCNC: 8.7 MG/DL (ref 8.6–10.2)
CHLORIDE BLD-SCNC: 105 MMOL/L (ref 98–107)
CO2: 24 MMOL/L (ref 22–29)
CREAT SERPL-MCNC: 0.8 MG/DL (ref 0.7–1.2)
CULTURE, BLOOD 2: NORMAL
EOSINOPHILS ABSOLUTE: 0 E9/L (ref 0.05–0.5)
EOSINOPHILS RELATIVE PERCENT: 0 % (ref 0–6)
GFR AFRICAN AMERICAN: >60
GFR NON-AFRICAN AMERICAN: >60 ML/MIN/1.73
GLUCOSE BLD-MCNC: 79 MG/DL (ref 74–99)
HCT VFR BLD CALC: 38.5 % (ref 37–54)
HEMOGLOBIN: 13.2 G/DL (ref 12.5–16.5)
IMMATURE GRANULOCYTES #: 0.04 E9/L
IMMATURE GRANULOCYTES %: 0.5 % (ref 0–5)
LYMPHOCYTES ABSOLUTE: 1.56 E9/L (ref 1.5–4)
LYMPHOCYTES RELATIVE PERCENT: 18.7 % (ref 20–42)
MAGNESIUM: 1.8 MG/DL (ref 1.6–2.6)
MCH RBC QN AUTO: 29.1 PG (ref 26–35)
MCHC RBC AUTO-ENTMCNC: 34.3 % (ref 32–34.5)
MCV RBC AUTO: 85 FL (ref 80–99.9)
MONOCYTES ABSOLUTE: 0.66 E9/L (ref 0.1–0.95)
MONOCYTES RELATIVE PERCENT: 7.9 % (ref 2–12)
NEUTROPHILS ABSOLUTE: 6.06 E9/L (ref 1.8–7.3)
NEUTROPHILS RELATIVE PERCENT: 72.8 % (ref 43–80)
PDW BLD-RTO: 12.8 FL (ref 11.5–15)
PHOSPHORUS: 3.2 MG/DL (ref 2.5–4.5)
PLATELET # BLD: 191 E9/L (ref 130–450)
PMV BLD AUTO: 10.6 FL (ref 7–12)
POTASSIUM SERPL-SCNC: 3.4 MMOL/L (ref 3.5–5)
RBC # BLD: 4.53 E12/L (ref 3.8–5.8)
SODIUM BLD-SCNC: 141 MMOL/L (ref 132–146)
TOTAL PROTEIN: 6.1 G/DL (ref 6.4–8.3)
WBC # BLD: 8.3 E9/L (ref 4.5–11.5)

## 2022-08-04 PROCEDURE — 80053 COMPREHEN METABOLIC PANEL: CPT

## 2022-08-04 PROCEDURE — 6360000002 HC RX W HCPCS: Performed by: NURSE PRACTITIONER

## 2022-08-04 PROCEDURE — 94640 AIRWAY INHALATION TREATMENT: CPT

## 2022-08-04 PROCEDURE — 2580000003 HC RX 258: Performed by: FAMILY MEDICINE

## 2022-08-04 PROCEDURE — 6360000002 HC RX W HCPCS: Performed by: FAMILY MEDICINE

## 2022-08-04 PROCEDURE — 85025 COMPLETE CBC W/AUTO DIFF WBC: CPT

## 2022-08-04 PROCEDURE — 83735 ASSAY OF MAGNESIUM: CPT

## 2022-08-04 PROCEDURE — 6370000000 HC RX 637 (ALT 250 FOR IP): Performed by: NURSE PRACTITIONER

## 2022-08-04 PROCEDURE — 84100 ASSAY OF PHOSPHORUS: CPT

## 2022-08-04 PROCEDURE — 6370000000 HC RX 637 (ALT 250 FOR IP)

## 2022-08-04 PROCEDURE — 99233 SBSQ HOSP IP/OBS HIGH 50: CPT | Performed by: INTERNAL MEDICINE

## 2022-08-04 PROCEDURE — 36415 COLL VENOUS BLD VENIPUNCTURE: CPT

## 2022-08-04 PROCEDURE — 82330 ASSAY OF CALCIUM: CPT

## 2022-08-04 RX ORDER — ASCORBIC ACID 500 MG
500 TABLET ORAL DAILY
Qty: 30 TABLET | Refills: 3 | Status: SHIPPED | OUTPATIENT
Start: 2022-08-04

## 2022-08-04 RX ORDER — PANTOPRAZOLE SODIUM 40 MG/1
40 TABLET, DELAYED RELEASE ORAL
Qty: 30 TABLET | Refills: 3 | Status: SHIPPED | OUTPATIENT
Start: 2022-08-05

## 2022-08-04 RX ORDER — CHOLECALCIFEROL (VITAMIN D3) 50 MCG
2000 TABLET ORAL DAILY
Qty: 30 TABLET | Refills: 0 | Status: SHIPPED | OUTPATIENT
Start: 2022-08-04

## 2022-08-04 RX ORDER — AMOXICILLIN AND CLAVULANATE POTASSIUM 875; 125 MG/1; MG/1
1 TABLET, FILM COATED ORAL 2 TIMES DAILY
Qty: 14 TABLET | Refills: 0 | Status: SHIPPED | OUTPATIENT
Start: 2022-08-04 | End: 2022-08-11

## 2022-08-04 RX ORDER — LEVOFLOXACIN 500 MG/1
500 TABLET, FILM COATED ORAL DAILY
Qty: 7 TABLET | Refills: 0 | Status: SHIPPED | OUTPATIENT
Start: 2022-08-04 | End: 2022-08-04 | Stop reason: HOSPADM

## 2022-08-04 RX ADMIN — ARFORMOTEROL TARTRATE 15 MCG: 15 SOLUTION RESPIRATORY (INHALATION) at 10:36

## 2022-08-04 RX ADMIN — SODIUM CHLORIDE, PRESERVATIVE FREE 10 ML: 5 INJECTION INTRAVENOUS at 10:27

## 2022-08-04 RX ADMIN — ENOXAPARIN SODIUM 40 MG: 100 INJECTION SUBCUTANEOUS at 10:25

## 2022-08-04 RX ADMIN — PANTOPRAZOLE SODIUM 40 MG: 40 TABLET, DELAYED RELEASE ORAL at 06:07

## 2022-08-04 RX ADMIN — Medication 2000 UNITS: at 10:23

## 2022-08-04 RX ADMIN — OXYCODONE HYDROCHLORIDE AND ACETAMINOPHEN 500 MG: 500 TABLET ORAL at 10:24

## 2022-08-04 RX ADMIN — BUDESONIDE 500 MCG: 0.5 SUSPENSION RESPIRATORY (INHALATION) at 10:36

## 2022-08-04 RX ADMIN — BARICITINIB 4 MG: 2 TABLET, FILM COATED ORAL at 10:24

## 2022-08-04 RX ADMIN — Medication 50 MG: at 10:23

## 2022-08-04 NOTE — PROGRESS NOTES
[place]  Department of Pulmonary, Critical Care and Sleep Medicine  [address] [facility]  Department of Internal Medicine  Progress Note    SUBJECTIVE:    Patient seen and examined. Denies any cough or respiratory issues. Overall with out complaints. Plans to be discharged today. OBJECTIVE:  Vitals:    08/03/22 2000 08/03/22 2315 08/04/22 0200 08/04/22 0915   BP: (!) 151/85 (!) 155/70 (!) 148/80 120/69   Pulse: 85 71 65 62   Resp: 26 20 19 18   Temp: 98.5 °F (36.9 °C) 98.2 °F (36.8 °C) 98.6 °F (37 °C) 97.5 °F (36.4 °C)   TempSrc: Temporal Temporal Temporal    SpO2: 96% 97% 96% 98%   Weight:       Height:         Constitutional: Alert,     EENT: EOMI SANDEE. MMM. No icterus. No thrush. Neck: No thyromegaly. Trachea was midline. Respiratory: Symmetrical.  Breath sounds were clear. Cardiovascular: Regular, No murmur. No rubs. Pulses:  Equal bilaterally. Abdomen: Soft without organomegaly. No rebound, rigidity. No guarding. Lymphatic: No lymphadenopathy. Musculoskeletal: Without weakness or gross deficits  Extremities:  No lower extremity edema. Reflexes appear adequate. Skin:  Warm and dry. No skin rashes. Neurological/Psychiatric: No acute psychosis. Cranial nerves are intact. DATA:    Monitor Strips:  Reviewed & discusses with technical team. No changes noted.     RADIOLOGY:  Films were read/reviewed/discussed with radiology shows no new images      CBC with Differential:    Lab Results   Component Value Date/Time    WBC 8.3 08/04/2022 07:31 AM    RBC 4.53 08/04/2022 07:31 AM    HGB 13.2 08/04/2022 07:31 AM    HCT 38.5 08/04/2022 07:31 AM     08/04/2022 07:31 AM    MCV 85.0 08/04/2022 07:31 AM    MCH 29.1 08/04/2022 07:31 AM    MCHC 34.3 08/04/2022 07:31 AM    RDW 12.8 08/04/2022 07:31 AM    METASPCT 0.9 07/30/2022 05:25 PM    LYMPHOPCT 18.7 08/04/2022 07:31 AM    MONOPCT 7.9 08/04/2022 07:31 AM    BASOPCT 0.1 08/04/2022 07:31 AM    MONOSABS 0.66 08/04/2022 07:31 AM    LYMPHSABS 1.56 08/04/2022 07:31 AM    EOSABS 0.00 08/04/2022 07:31 AM    BASOSABS 0.01 08/04/2022 07:31 AM     CMP:    Lab Results   Component Value Date/Time     08/04/2022 07:31 AM    K 3.4 08/04/2022 07:31 AM    K 4.4 08/01/2022 04:35 AM     08/04/2022 07:31 AM    CO2 24 08/04/2022 07:31 AM    BUN 16 08/04/2022 07:31 AM    CREATININE 0.8 08/04/2022 07:31 AM    GFRAA >60 08/04/2022 07:31 AM    LABGLOM >60 08/04/2022 07:31 AM    GLUCOSE 79 08/04/2022 07:31 AM    PROT 6.1 08/04/2022 07:31 AM    LABALBU 3.1 08/04/2022 07:31 AM    CALCIUM 8.7 08/04/2022 07:31 AM    BILITOT 0.3 08/04/2022 07:31 AM    ALKPHOS 73 08/04/2022 07:31 AM    AST 15 08/04/2022 07:31 AM    ALT 18 08/04/2022 07:31 AM       CLINICAL ASSESMENT:  Acute hypoxemic respiratory failure-resolved  Altered mental status-resolved  COVID-19 infection  History of marijuana use  Ileus/enteritis-resolved        Plan:  Hypoxemic respiratory failure resolved  Altered mental status has resolved  For COVID-19 infection patient had received baricitinib. We will continue MAC, vitamin D,. Continue amoxicillin clavulanic acid as prescribed  Tobacco cessation discussed  Patient does not need to follow-up with pulmonary outpatient however should he have any pulmonary needs we will be happy to see him.       Rocio Mcdonald DO

## 2022-08-04 NOTE — ADT AUTH CERT
Patient Demographics    Name Patient ID SSN Gender Identity Birth Date   Pj Wallis [id number]  Male [birth year] (47 yrs)     Address Phone Email Employer    [phone] (C)   [phone] (U)   [phone] (H) [email or web address] [address] Race Occupation Emp Status    255 [facility] /  -- Full Time      Reg Status PCP Date Last Verified Next Review Date    Verified -- 22      Admission Date Discharge Date Admitting Provider     22 -- Ha Fabian DO       Marital Status Confucianist      Single NATION [facility]        Emergency Contact 1   John Lobo ([place])   James   [phone] [facility])       [address] [place] [address] [de-identified]  [address] [facility] Name/Sex/Relation Subscriber  Subscriber Address/Phone Subscriber Emp/Emp Phone   1.  [facility] MEDICAID   [id number] Ami Nestle - Male   (Self) 1975 [address]   [phone](F) SECURITY        Utilization Reviews         Viral Illness, Acute - Care Day 4 (2022) by Que Espinoza RN       Review Status Review Entered   Completed 2022 10:05      Criteria Review      Care Day: 4 Care Date: 2022 Level of Care: ICU    Guideline Day 2    Clinical Status    (X) * Hypotension absent    2022 10:00 AM EDT by Mary Rahman      BP   : 175/86    ( ) * No requirement for mechanical ventilation    2022 10:00 AM EDT by Mary Rahman      on ventilator    ( ) * Oxygenation at baseline or improved    2022 10:00 AM EDT by Mary Rahman      SpO2 : 99% on 40 FiO2 per vent    ( ) * Mental status at baseline    2022 10:00 AM EDT by Mary Rahman      intubated, sedated    Activity    ( ) Advance activity as tolerated    2022 10:00 AM EDT by Mary Rahman      on strict bedrest    Routes    ( ) * Oral hydration    2022 10:00 AM EDT by Jany Johnson      0.9 % sodium chloride infusion 10ml/hr PRN IV x2  0.9 % sodium chloride infusion 75ml/hr cont IV    (X) Oral or IV medications    8/4/2022 10:04 AM EDT by Jany Benoit      [facility] injection 4 mg q6hrs PRN IV x1    8/4/2022 10:04 AM EDT by Kassandra Gonzalez      dexmedetomidine (PRECEDEX) 1,000 mcg in sodium chloride 0.9 % 250 mL infusion 1.72-25.84ml/hr cont IV (DC'd)  fentaNYL 10 mcg/ml in 0.9%  ml infusion 2.5-20ml/hr cont IV (DC'd)  hydrALAZINE (APRESOLINE) injection 10 mg q6hrs PRN IV x1    8/4/2022 10:00 AM EDT by Kassandra Gonzalez      baricitinib (OLUMIANT) tablet 4 mg QD PO x1   dexamethasone (DECADRON) 20 mg in NaCl 0.9 % 50 mL IVPB q24hrs IV x1  levETIRAcetam (KEPPRA) 500 mg/100 mL IVPB q12hrs IV x2  pantoprazole (PROTONIX) 40 mg in NaCl (PF) 10 mL injection QD IV x1 (DC'd)    ( ) Usual diet    8/4/2022 10:00 AM EDT by Kassandra Gonzalez      ADULT TUBE FEEDING; Orogastric; Standard without Fiber; Continuous; 10; Yes; 10; Q 4 hours; 40; 30; Q 4 hours    Interventions    (X) Possible isolation    8/4/2022 10:00 AM EDT by Jany Benoit      droplet plus isolation    (X) Pulse oximetry    8/4/2022 10:00 AM EDT by Kassandra Gonzalez      continuous telemetry monitoring    (X) Possible oxygen    8/4/2022 10:00 AM EDT by Kassandra Gonzalez      on 40 FiO2 per vent    Medications    (X) Possible antibiotic (eg, for bacterial coinfection or superinfection)    8/4/2022 10:00 AM EDT by Jany Benoit      piperacillin-tazobactam (ZOSYN) 4,500 mg in dextrose 5 % 100 mL IVPB ([id number]) q8hrs IV x3  vancomycin (VANCOCIN) 1,000 mg in dextrose 5 % 250 mL IVPB ([id number]) q12hrs IV x1 (DC'd)    (X) Possible DVT prophylaxis    8/4/2022 10:00 AM EDT by Jany Benoit      enoxaparin (LOVENOX) injection 40 mg QD SC x1    * Milestone   Additional Notes   DATE: 08/02 day 4 ICU         PERTINENT UPDATES: on 40 FiO2 per vent, later extubated and placed on 4L O2 per NC; tachypneic RR 20-22         VITALS:   BP : 175/86   KS   : 85   RR   : 20-22   Temp : 99.7   SpO2 : 99% on 40 FiO2 per vent then weaned to 4L O2 per NC         ABNL/PERTINENT LABS/RADIOLOGY/DIAGNOSTIC STUDIES:   GLUCOSE, FASTING,    CALCIUM, SERUM 8.2    Total Protein 6.1      Albumin 3.0      Hemoglobin Quant 12.2    Hematocrit 36.6       pO2 174.0    HCO3 26.4    O2 Sat 99.2   O2Hb 98.5         XR CHEST PORTABLE    Impression   Persistent patchy infiltrate right lung base. PHYSICAL EXAM:   General Appearance:  Extubated follows commands NAD    HEENT: Head atraumatic Good dentition mucous membranes dry. Lung: Equal expansion. Symmetrical.  BBS Lungs clear diminished bilaterally in bases. Cardiovascular: Regular rate   Neurologic: Mental status:   AAO x 3Follows commands. CHADWICK x 4 with equal strength. Speech clear and appropriate         MD CONSULTS/ASSESSMENT AND PLAN:   [facility] NOTES      ASSESSMENT AND PLAN     Acute Hypoxic Respiratory Failure   -Patient is currently intubated and sedated   -Continue ventilator support   -Likely multifactorial with COVID, Aspiration Pneumonia, and ?seizure       Aspiration Pneumonia   Patient being covered with Zosyn and vancomycin. XR chest -8/2/22 shows persistent infiltrate right lung base       COVID 19   -Patient is on decadron, vitamin C, and Zinc . Baricitinib for 2 weeks from 8/2/22-8/15/22       Seizure   -Patient loaded with keppra   -Neurology consulted   -MRI brain -8/1/22 showed no acute findings   - EEG showed moderate encephalopathy       Enteritis/Ileus   General surgery consulted   Patient okay for tube feeding. NEURO NOTES   Patient in COVID 19 droplet precaution. Spoke with RN outside of patient's room this morning. There have been no witnessed seizures. Patient is alert and follows commands. Plans to be extubated today. MRI brain was negative for acute findings and EEG showed moderate encephalopathy.        Due to negative testing, no history of seizures neurology recommends discontinuing Keppra       No further neurologic testing at this time         CRITICAL CARE NOTES   No episodes of desaturation overnight. Remains intubated on Precedex and Fentanyl gtt   Awake following commands. VSS afebrile. No pressor requirements. MRI last night No acute intracranial abnormality. Assessment and Plan:   Acute Metabolic Encephalopathy, Resolved following commands. Seizure disorder, question seizure    -AMS in the ED, possible seizure activity, CT no acute process   -Discontinue Levetiracetam Neurology recommendations               -Neurology consulted, appreciate input.   - EEG no seizure epiltiform               - MRI No intracranial abnormalities.               -Patient is following commands               -Discontinue seizure precautions       Acute hypoxic respiratory failure, 2/2 COVID-19 vs. PNA   COVID-19   Barcitinib 4 mg x 14 doses. Started 08/01/22   -Respiratory culture - moderate mixed bacterial morphotype on gram stain. -CXR daily, CTA negative for PE, atelectatsis/infiltrates in the lower lobes more on the right side and right middle lobe concerns for PNA   -Continue brovana, pulmicort   - Duo Nebs prn    -Continue Decadron   -Continue zinc, Vit D and ascorbic acid   -Wean FiO2 and keep pulse ox >92%   - Discontinued  Zosyn   -Every 3 days. CXR   -Nicotine patch                    Ileus/Enteritis per CT resolving BM 08/01/22               -Patient is refusing bedside  swallow study               -General Surgery consulted, appreciate input signed off.               - Continue PPI   UTI                -UA shows trace leukocyte esterase with rare bacteria               -Urine culture no growth.                -Strep and legionella negative               -Blood cultures culture Micrococcus species         MEDICATIONS:   Arformoterol Tartrate (BROVANA) nebulizer solution 15 mcg BID NEB x2   budesonide (PULMICORT) nebulizer suspension 500 mcg BID NEB x2   ipratropium-albuterol (DUONEB) nebulizer solution 1 ampule q4hrs IN x5 (DC'd)         PT/OT/SLP/CM ASSESSMENT OR NOTES:   CM NOTES   COVID + intubated. SW spoke with patient's cousin Tracey Loco for initial assessment. Patient lives with a cousin in a 2 story. Patient independent prior to admission. Patient has no hx with with HHC, and has no proference if recommended. Patient PCP is Dr.Benjamin Gould and pharmacy is [facility] on [address]. Patient family will transport the patient home at discharge. SW will continue to follow and assist with transition of care. DIETICIAN NOTES   Nutrition Recommendations/Plan:    Continue NPO, Modify Tube Feeding to better meet est needs:   Standard with fiber (Jevity 1.5) @ 50 ml/hr + 1 protein modular daily via feeding tube.    Will provide: 1200 ml tv, 1800 kcals, 76 gm pro (1900 kcals & 102 gm pro w/ mod), 912 ml free water   Regimen meets 100% est calorie & protein needs              Viral Illness, Acute - Care Day 3 (8/1/2022) by Governor Moe RN       Review Status Review Entered   Completed 8/4/2022 08:58      Criteria Review      Care Day: 3 Care Date: 8/1/2022 Level of Care: ICU    Guideline Day 2    Clinical Status    (X) * Hypotension absent    8/4/2022 8:42 AM EDT by Radha Daley      BP   : 164/95    ( ) * No requirement for mechanical ventilation    8/4/2022 8:42 AM EDT by Radha Daley      on ventilator    ( ) * Oxygenation at baseline or improved    8/4/2022 8:42 AM EDT by Radha Daley      SpO2 : 90% on 40 FiO2 per vent    ( ) * Mental status at baseline    8/4/2022 8:42 AM EDT by Radha Daley      intubated, sedated    Activity    ( ) Advance activity as tolerated    8/4/2022 8:58 AM EDT by Radha Daley      on strict bedrest    Routes    ( ) * Oral hydration    8/4/2022 8:45 AM EDT by Jany Cross      0.9 % sodium chloride infusion 10ml/hr PRN IV x2  0.9 % sodium chloride infusion 75ml/hr cont IV    (X) Oral or IV medications    8/4/2022 8:58 AM EDT by Tiana Edwards      midazolam (VERSED) 1 mg/mL in D5W infusion 1-10ml/hr cont IV   midazolam PF (VERSED) injection 2 mg q4hrs PRN IV x1    8/4/2022 8:45 AM EDT by Tiana Edwards      sodium phosphate 15 mmol in sodium chloride 0.9 % 250 mL IVPB ONCE IV x1  dexmedetomidine (PRECEDEX) 1,000 mcg in sodium chloride 0.9 % 250 mL infusion 1.72-25.84ml/hr cont IV   fentaNYL 10 mcg/ml in 0.9%  ml infusion 2.5-20ml/hr cont IV    8/4/2022 8:42 AM EDT by Tiana Edwards      dexamethasone (DECADRON) 20 mg in sodium chloride 0.9 % 50 mL IVPB q24hrs IV x1  levETIRAcetam (KEPPRA) 500 mg/100 mL IVPB q12hrs IV x2  pantoprazole (PROTONIX) 40 mg in sodium chloride (PF) 10 mL injection QD IV x1    ( ) Usual diet    8/4/2022 8:59 AM EDT by Tiana Edwards      ADULT TUBE FEEDING; Orogastric; Standard without Fiber; Continuous; 10; Yes; 10; Q 4 hours; 40; 30; Q 4 hours  on NPO    Interventions    (X) Possible isolation    8/4/2022 8:59 AM EDT by Tiana Edwards      on droplets plus isolation    (X) Pulse oximetry    8/4/2022 8:59 AM EDT by Tiana Edwards      continuous telemetry monitoring    (X) Possible oxygen    8/4/2022 8:59 AM EDT by Tiana Edwards      on 40 FiO2 per vent    Medications    (X) Possible antibiotic (eg, for bacterial coinfection or superinfection)    8/4/2022 8:42 AM EDT by Jany Virgen      piperacillin-tazobactam (ZOSYN) 4,500 mg in dextrose 5 % 100 mL IVPB ([id number]) q8hrs IV x3  vancomycin (VANCOCIN) 1,000 mg in dextrose 5 % 250 mL IVPB ([id number]) q12hrs IV x1    (X) Possible DVT prophylaxis    8/4/2022 8:42 AM EDT by Pinky Vancleve, Nolanie      enoxaparin (LOVENOX) injection 40 mg QD SC x1    * Milestone   Additional Notes   DATE: 08/01 day 3 ICU         PERTINENT UPDATES: on 40 FiO2 per vent; on bilateral  wrist restraints  as patient is pulling at lines/tubes when restraints are loosened, restraints dc'd later in the evening when patient expressed understanding not to pull at lines and tubes; on droplet plus isolation         VITALS:   BP   : 164/95   SC   : 92   RR   : 18   Temp : 99.1   SpO2 : 90% on 40 FiO2 per vent         ABNL/PERTINENT LABS/RADIOLOGY/DIAGNOSTIC STUDIES:   CALCIUM, SERUM    8.1         Phosphorus                2.4             Total Protein               5.8    CRP           5.0      Albumin 3.0      Hemoglobin Quant 12.1    Hematocrit 36.2       PCO2 33.2 45.2    pO2 169.2 140.6    O2 Sat 99.2 98.7   O2Hb 98.2 97.8      XR CHEST   Impression 0825   Improving aeration of right lower lobe      Impression 1549   Stable chest with persistent right lower lobe infiltrate. MRI BRAIN WO CONTRAST     Impression   No acute intracranial abnormality visualized. US DUP LOWER EXTREMITIES BILATERAL VENOUS    Impression   Within the visualized vessels there is no evidence for deep venous   thrombosis      Blood Culture = no growth          PHYSICAL EXAM:   General Appearance: Intubated, sedated, follows commands   HEENT:  ETT to vent, PERRLA, minimal secretions, dry mucous membranes   Lung: Lung sounds diminished bilaterally, scattered rhonchi   Cardiovascular: Regular rate   Musculoskeletal:Intubated and sedated   Neurologic: Mental status: intubated, sedated. Follows commands. MD CONSULTS/ASSESSMENT AND PLAN:   IM NOTES      ASSESSMENT AND PLAN    Acute Hypoxic Respiratory Failure   -Patient is currently intubated and sedated   -Continue ventilator support   -Likely multifactorial with COVID, Aspiration Pneumonia, and ?seizure       Aspiration Pneumonia   Patient being covered with Zosyn and vancomycin. COVID 19   -Patient is on decadron, vitamin C, and Zinc       Seizure   -Patient loaded with keppra   -Neurology consulted   -MRI   - EEG showed moderate encephalopathy       Enteritis/Ileus   General surgery consulted   Patient okay for tube feeding. ? Pyelo   Patient on zosyn         CRITICAL CARE NOTES Overnight has periods of desaturations with long recovery. Intubated and sedated. Precedex gtt started. Mechanical ventilation day 2. Enhanced droplet precautions. Repeat blood cultures       Assessment and Plan:   Acute Metabolic Encephalopathy,   Seizure disorder, new onset   -AMS in the ED, possible seizure activity, CT no acute process   -Continue Levetiracetam               -Neurology consulted, appreciate input.   - EEG no seizure epiltiform               - Awaiting MRI               -Patient is following commands               - Precedex gtt. -PRN versed for seizure activity no seizure activity nont                -Continue seizure precautions       Acute hypoxic respiratory failure, 2/2 COVID-19 vs. PNA   COVID-19   Barcitinib 4 mg x 14 doses. Hx of Tobacco use   Hx of Marijuana use               -Procalcitonin 0.06, Lactic acid 0.8, CRP 0.3               -Respiratory culture - moderate mixed bacterial morphotype on gram stain. -CXR daily, CTA negative for PE, atelectatsis/infiltrates in the lower lobes more on the right side and right middle lobe concerns for PNA   -Continue Duonebs, brovana, pulmicort   -Continue Decadron   -Continue zinc, Vit D and ascorbic acid   -Chest physiotherapy   -SAT and SBT,daily.    -Wean FiO2 and keep pulse ox >92%   -Continue Zosyn   -Daily CXR   -Start nicotine patch       Ileus/Enteritis per CT               -Patient currently NPO               - Tube feedings               - Dietary consulted for tube feedings. -General Surgery consulted, appreciate input               -On PPI       UTI/Concerns for pyelonephritis               -UA shows trace leukocyte esterase with rare bacteria               -Zosyn day 3, continued               - Vancomycin added Pharmacy to dose. -Urine culture no growth. -Strep and legionella negative               -Blood cultures [facility] in clusters repeat cultures sent. Hx of right shoulder supraspinatus tear-recently presented to the ED on 7/6/22 for right should pain               -Will consult orthopedics to follow up while inpatient. -MRI from 2019 shows full thickness tear      Electrolytes/Fluids/Nutrition: Replace as needed, NS @ 75cc/hour, NPO tube feeding to start.           MEDICATIONS:   Arformoterol Tartrate (BROVANA) nebulizer solution 15 mcg BID NEB x2   budesonide (PULMICORT) nebulizer suspension 500 mcg BID NEB x2   ipratropium-albuterol (DUONEB) nebulizer solution 1 ampule q4hrs IN x5

## 2022-08-04 NOTE — DISCHARGE SUMMARY
Hospitalist Discharge Summary    Patient ID: Mynor Salinas   Patient : 1975  Patient's PCP: Helena Caldwell DO    Admit Date: 2022   Admitting Physician: Bonifacio Campo DO    Discharge Date:  2022   Discharge Physician: Susy Stafford MD   Discharge Condition: Stable  Discharge Disposition: [facility] course in brief:  (Please refer to daily progress notes for a comprehensive review of the hospitalization by requesting medical records)   Patient admitted on 2022 with chest pain and shortness of breath. Patient became unresponsive and developed acute hypoxic respiratory failure. He was placed on an non re-breather and ultimately intubated for airway protection. CTA of the chest showed no PE but possible bilateral pneumonia vs atelectasis worse on the right. Patient started on Zosyn for aspiration Pneumonia. He did test positive for COVID. Started on decadron, zinc, and vitamin C. Given concern for possible seizure. Patient loaded with keppra and neurology consulted. Patient intubated for acute hypoxic respiratory failure-and extubated on 22 and currently on room air saturating well  -Likely multifactorial with COVID, Aspiration Pneumonia, and seizure ruled out per neurology. EEG ordered and negative for any epileptiform changes and with no prior history of seizures neurology recommends discontinuing the Keppra. MRI of the brain did not show any acute findings  Patient being covered with Zosyn and vancomycin for pneumonia. Which has been discontinued at this time. XR chest -22 shows persistent infiltrate right lung base and switched to Augmentin at discharge to be continued for 7 days  CT of the abdomen showed enteritis/ileus for which general surgery was consulted and patient was kept n.p.o. until cleared for diet and advanced as tolerated. Also found to have a rotator cuff tear for which outpatient orthopedic follow-up was considered.       Consults: IP CONSULT TO INTERNAL MEDICINE  IP CONSULT TO CRITICAL CARE  IP CONSULT TO NEUROLOGY  IP CONSULT TO GENERAL SURGERY  IP CONSULT TO ORTHOPEDIC SURGERY  IP CONSULT TO DIETITIAN    Discharge Diagnoses:  COVID-19 pneumonia with acute hypoxic respiratory failure  Aspiration pneumonia  Metabolic encephalopathy  Enteritis/ileus  Rotator cuff tear      Discharge Instructions / Follow up: Follow-up with PCP within 1 week of discharge. Follow-up with consultants as indicated by them. Compliance with medications as prescribed on discharge. No future appointments. The patient's condition is stable. At this time the patient is without objective evidence of an acute process requiring continuing hospitalization or inpatient management. They are stable for discharge with outpatient follow-up. I have spoken with the patient and discussed the results of the current hospitalization, in addition to providing specific details for the plan of care and counseling regarding the diagnosis and prognosis. The plan has been discussed in detail and they are aware of the specific conditions for emergent return, as well as the importance of follow-up. Their questions are answered at this time and they are agreeable with the plan for discharge to home. Continued appropriate risk factor modification of blood pressure, diabetes and serum lipids will remain essential to reducing risk of future atherosclerotic development    Activity: activity as tolerated    Physical exam:  General appearance: No apparent distress, appears stated age and cooperative. HEENT: Conjunctivae/corneas clear. Mucous membranes moist.  Neck: Supple. No JVD. Respiratory:  Clear to auscultation bilaterally. Normal respiratory effort. Cardiovascular:  RRR. S1, S2 without MRG. PV: Pulses palpable. No edema. Abdomen: Soft, non-tender, non-distended. +BS  Musculoskeletal: No obvious deformities. Skin: Normal skin color. No rashes or lesions.  Good turgor. Neurologic:  Grossly non-focal. Awake, alert, following commands. Psychiatric: Alert and oriented, thought content appropriate, normal insight and judgement    Significant labs:  CBC:   Recent Labs     08/02/22  0506 08/03/22  0446 08/04/22  0731   WBC 7.5 10.0 8.3   RBC 4.25 4.67 4.53   HGB 12.2* 13.2 13.2   HCT 36.6* 39.4 38.5   MCV 86.1 84.4 85.0   RDW 13.0 13.1 12.8    187 191     BMP:   Recent Labs     08/02/22  0506 08/03/22  0446 08/04/22  0731    143 141   K 4.3 3.8 3.4*    106 105   CO2 26 26 24   BUN 12 12 16   CREATININE 0.8 0.9 0.8   MG 2.0 1.8 1.8   PHOS 2.8 3.0 3.2     LFT:  Recent Labs     08/02/22  0506 08/03/22 0446 08/04/22  0731   PROT 6.1* 6.9 6.1*   ALKPHOS 76 84 73   ALT 18 23 18   AST 16 20 15   BILITOT 0.2 0.4 0.3     PT/INR: No results for input(s): INR, APTT in the last 72 hours. BNP: No results for input(s): BNP in the last 72 hours. Hgb A1C: No results found for: LABA1C  Folate and B12: No results found for: [id number], No results found for: FOLATE  Thyroid Studies: No results found for: TSH, N2DODIT, S9EYPQX, THYROIDAB    Urinalysis:    Lab Results   Component Value Date/Time    NITRU Negative 07/30/2022 05:25 PM    WBCUA 2-5 07/30/2022 05:25 PM    BACTERIA RARE 07/30/2022 05:25 PM    RBCUA 0-1 07/30/2022 05:25 PM    BLOODU Negative 07/30/2022 05:25 PM    SPECGRAV 1.025 07/30/2022 05:25 PM    GLUCOSEU Negative 07/30/2022 05:25 PM       Imaging:  XR SHOULDER RIGHT (MIN 2 VIEWS)    Result Date: 7/31/2022  EXAMINATION: THREE XRAY VIEWS OF THE RIGHT SHOULDER 7/31/2022 12:52 pm COMPARISON: None. HISTORY: ORDERING SYSTEM PROVIDED HISTORY: Shoulder Pain TECHNOLOGIST PROVIDED HISTORY: Reason for exam:->Shoulder Pain What reading provider will be dictating this exam?->CRC FINDINGS: Three views of the right shoulder reveal degenerative changes involving the acromioclavicular junction with spurring of the humeral head. The cortex is intact.   No fracture or dislocation. Ill-defined opacification seen within the right lung base. Ill-defined opacification seen within the right lung base. No evidence of fracture or dislocation. Degenerative changes seen within the shoulder. XR HIP LEFT (2-3 VIEWS)    Result Date: 7/31/2022  EXAMINATION: TWO XRAY VIEWS OF THE LEFT HIP 7/31/2022 12:52 pm COMPARISON: None. HISTORY: ORDERING SYSTEM PROVIDED HISTORY: Pain TECHNOLOGIST PROVIDED HISTORY: Reason for exam:->Pain What reading provider will be dictating this exam?->CRC FINDINGS: Two views of the left hip reveal spurring of the acetabulum. The cortex is intact. Joint spaces are preserved. No fracture or dislocation. Degenerative changes in the left hip with no evidence of acute bony abnormality     CT HEAD WO CONTRAST    Result Date: 7/30/2022  EXAMINATION: CT OF THE HEAD WITHOUT CONTRAST; CTA OF THE CHEST; CT OF THE ABDOMEN AND PELVIS WITH CONTRAST  7/30/2022 5:49 pm TECHNIQUE: CT of the head was performed without the administration of intravenous contrast. Automated exposure control, iterative reconstruction, and/or weight based adjustment of the mA/kV was utilized to reduce the radiation dose to as low as reasonably achievable.; CTA of the chest was performed after the administration of intravenous contrast.  Multiplanar reformatted images are provided for review. MIP images are provided for review. Automated exposure control, iterative reconstruction, and/or weight based adjustment of the mA/kV was utilized to reduce the radiation dose to as low as reasonably achievable.; CT of the abdomen and pelvis was performed with the administration of intravenous contrast. Multiplanar reformatted images are provided for review. Automated exposure control, iterative reconstruction, and/or weight based adjustment of the mA/kV was utilized to reduce the radiation dose to as low as reasonably achievable.  COMPARISON: September 4, 2014 HISTORY: ORDERING SYSTEM PROVIDED HISTORY: ams TECHNOLOGIST PROVIDED HISTORY: Has a \"code stroke\" or \"stroke alert\" been called? ->No Reason for exam:->ams Decision Support Exception - unselect if not a suspected or confirmed emergency medical condition->Emergency Medical Condition (MA) What reading provider will be dictating this exam?->CRC FINDINGS: CT head. The ventricles are of normal size and configuration. The brain parenchyma has normal architecture and attenuation. There is no acute stroke, mass or hemorrhage. There is soft tissue density with the low-attenuation completely filling the nasopharynx and nasal cavity which may be due to secretions/inflammation. Underlying malignant process has to be excluded. Direct visualization recommended. No acute intracranial abnormality. Soft tissue density completely filling the nasal cavity and nasopharynx probably inflammatory/secretions. Malignancy has to be excluded and direct visualization recommended. CTA chest. Comparison September 4, 2014 Findings The heart and the great vessels are normal.  Nonenlarged mediastinal and hilar lymph nodes are present. Trachea and major bronchi are patent. There are no filling defects in the main pulmonary artery and the central branches. Says mucous plugging is identified in the lower lobe bronchi bilaterally. There is atelectasis/infiltrates in the right lower lobe, right middle lobe and to a lesser extent in the left lower lobe. There is no pleural effusion. Impression There is no central pulmonary embolism or aortic dissection. Mucous plugging in the lower lobes bilaterally with atelectasis/infiltrates in the lower lobes more on the right side and right middle lobe concerning for pneumonia. CT abdomen pelvis. The liver is of normal architecture. Gallbladder is partially distended. Spleen, pancreas, and the adrenals are normal.  There is heterogeneous enhancement of the kidneys with the areas of decreased attenuation concerning for pyelonephritis.   Dilated fluid-filled small bowel loops are noted likely ileus/enteritis with small bowel loops measuring up to 2 cm. Pelvis. Bladder is partially distended with the Hampton catheter and the wall thickening. Colon is normal.  The appendix is partially identified and appears normal. Impression Heterogeneous enhancement of the kidneys concerning for multifocal pyelonephritis. There is also thickening of the urinary bladder. Please correlate with urinalysis. Dilated fluid-filled small bowel loops likely ileus/enteritis. CT ABDOMEN PELVIS W IV CONTRAST Additional Contrast? None    Result Date: 7/30/2022  EXAMINATION: CT OF THE HEAD WITHOUT CONTRAST; CTA OF THE CHEST; CT OF THE ABDOMEN AND PELVIS WITH CONTRAST  7/30/2022 5:49 pm TECHNIQUE: CT of the head was performed without the administration of intravenous contrast. Automated exposure control, iterative reconstruction, and/or weight based adjustment of the mA/kV was utilized to reduce the radiation dose to as low as reasonably achievable.; CTA of the chest was performed after the administration of intravenous contrast.  Multiplanar reformatted images are provided for review. MIP images are provided for review. Automated exposure control, iterative reconstruction, and/or weight based adjustment of the mA/kV was utilized to reduce the radiation dose to as low as reasonably achievable.; CT of the abdomen and pelvis was performed with the administration of intravenous contrast. Multiplanar reformatted images are provided for review. Automated exposure control, iterative reconstruction, and/or weight based adjustment of the mA/kV was utilized to reduce the radiation dose to as low as reasonably achievable. COMPARISON: September 4, 2014 HISTORY: ORDERING SYSTEM PROVIDED HISTORY: ams TECHNOLOGIST PROVIDED HISTORY: Has a \"code stroke\" or \"stroke alert\" been called? ->No Reason for exam:->ams Decision Support Exception - unselect if not a suspected or confirmed emergency medical Colon is normal.  The appendix is partially identified and appears normal. Impression Heterogeneous enhancement of the kidneys concerning for multifocal pyelonephritis. There is also thickening of the urinary bladder. Please correlate with urinalysis. Dilated fluid-filled small bowel loops likely ileus/enteritis. XR CHEST PORTABLE    Result Date: 8/2/2022  EXAMINATION: ONE XRAY VIEW OF THE CHEST 8/2/2022 7:53 am COMPARISON: 1 August 2022 HISTORY: ORDERING SYSTEM PROVIDED HISTORY: f/u intubated on mech vent, + COVID TECHNOLOGIST PROVIDED HISTORY: Reason for exam:->f/u intubated on mech vent, + COVID What reading provider will be dictating this exam?->CRC FINDINGS: Single AP upright portable chest demonstrates tubes and lines remaining in good position. The ET tube remains approximately 4 cm above the rodolfo. There remains a patchy infiltrate at the right lung base. The cardiac silhouette is normal.  There is no evidence of a pneumothorax. Persistent patchy infiltrate right lung base. XR CHEST PORTABLE    Result Date: 8/1/2022  EXAMINATION: ONE XRAY VIEW OF THE CHEST 8/1/2022 3:47 pm COMPARISON: 1 August 2022 at 7:08 a.m. HISTORY: ORDERING SYSTEM PROVIDED HISTORY: desaturation tube assessment, resp failure TECHNOLOGIST PROVIDED HISTORY: Reason for exam:->desaturation tube assessment, resp failure FINDINGS: Single AP semi upright portable chest demonstrates patchy alveolar infiltrate in the right lower lobe with no change. The endotracheal tube is approximately 4 cm above the rodolfo in good position. The orogastric tube is approximately 10 cm below the EG junction in good position. The left lung is clear. There is no evidence of a pneumothorax. Stable chest with persistent right lower lobe infiltrate.      XR CHEST PORTABLE    Result Date: 8/1/2022  EXAMINATION: ONE XRAY VIEW OF THE CHEST 8/1/2022 7:36 am COMPARISON: 07/31/2022 HISTORY: ORDERING SYSTEM PROVIDED HISTORY: f/u intubated on mech vent, + COVID TECHNOLOGIST PROVIDED HISTORY: Reason for exam:->f/u intubated on mech vent, + COVID What reading provider will be dictating this exam?->CRC FINDINGS: There is right lower lobe infiltrate but improvement and resolution of some of the collapse since the prior study. There are no effusions. ET and NG tubes are appropriate. Improving aeration of right lower lobe     XR CHEST PORTABLE    Result Date: 7/31/2022  EXAMINATION: ONE XRAY VIEW OF THE CHEST 7/31/2022 8:31 am COMPARISON: 07/30/2022 HISTORY: ORDERING SYSTEM PROVIDED HISTORY: f/u intubated on mech vent, + COVID TECHNOLOGIST PROVIDED HISTORY: Reason for exam:->f/u intubated on mech vent, + COVID What reading provider will be dictating this exam?->CRC FINDINGS: Portable chest reveals endotracheal tube and nasogastric tube stable and unchanged. There is no change seen in the mild increased markings at the right lung base. Some minimal increased markings as well at the left lung base. The heart is enlarged. Remainder of the lung fields are grossly clear. Development of some mild increased markings at the left lung base with no change in the airspace disease at the right lung base. Lines and tubes are stable. XR CHEST PORTABLE    Result Date: 7/30/2022  EXAMINATION: ONE XRAY VIEW OF THE CHEST 7/30/2022 7:43 pm COMPARISON: Previous CT scan on the same day. HISTORY: ORDERING SYSTEM PROVIDED HISTORY: Post tube TECHNOLOGIST PROVIDED HISTORY: Reason for exam:->Post tube What reading provider will be dictating this exam?->CRC FINDINGS: Heart size is normal.  Endotracheal tube is 4.7 cm above the rodolfo. Nasogastric tube is noted. There is atelectasis/infiltrates in the right lower lobe and possibly in the right middle lobe. There may be minimal atelectasis in the left base. Atelectasis/infiltrates in the right lower lobe and right middle lobe concerning for pneumonia.      CTA PULMONARY W CONTRAST    Result Date: 7/30/2022  EXAMINATION: CT OF THE HEAD WITHOUT CONTRAST; CTA OF THE CHEST; CT OF THE ABDOMEN AND PELVIS WITH CONTRAST  7/30/2022 5:49 pm TECHNIQUE: CT of the head was performed without the administration of intravenous contrast. Automated exposure control, iterative reconstruction, and/or weight based adjustment of the mA/kV was utilized to reduce the radiation dose to as low as reasonably achievable.; CTA of the chest was performed after the administration of intravenous contrast.  Multiplanar reformatted images are provided for review. MIP images are provided for review. Automated exposure control, iterative reconstruction, and/or weight based adjustment of the mA/kV was utilized to reduce the radiation dose to as low as reasonably achievable.; CT of the abdomen and pelvis was performed with the administration of intravenous contrast. Multiplanar reformatted images are provided for review. Automated exposure control, iterative reconstruction, and/or weight based adjustment of the mA/kV was utilized to reduce the radiation dose to as low as reasonably achievable. COMPARISON: September 4, 2014 HISTORY: ORDERING SYSTEM PROVIDED HISTORY: ams TECHNOLOGIST PROVIDED HISTORY: Has a \"code stroke\" or \"stroke alert\" been called? ->No Reason for exam:->ams Decision Support Exception - unselect if not a suspected or confirmed emergency medical condition->Emergency Medical Condition (MA) What reading provider will be dictating this exam?->CRC FINDINGS: CT head. The ventricles are of normal size and configuration. The brain parenchyma has normal architecture and attenuation. There is no acute stroke, mass or hemorrhage. There is soft tissue density with the low-attenuation completely filling the nasopharynx and nasal cavity which may be due to secretions/inflammation. Underlying malignant process has to be excluded. Direct visualization recommended. No acute intracranial abnormality.  Soft tissue density completely filling the nasal cavity and nasopharynx probably inflammatory/secretions. Malignancy has to be excluded and direct visualization recommended. CTA chest. Comparison September 4, 2014 Findings The heart and the great vessels are normal.  Nonenlarged mediastinal and hilar lymph nodes are present. Trachea and major bronchi are patent. There are no filling defects in the main pulmonary artery and the central branches. Says mucous plugging is identified in the lower lobe bronchi bilaterally. There is atelectasis/infiltrates in the right lower lobe, right middle lobe and to a lesser extent in the left lower lobe. There is no pleural effusion. Impression There is no central pulmonary embolism or aortic dissection. Mucous plugging in the lower lobes bilaterally with atelectasis/infiltrates in the lower lobes more on the right side and right middle lobe concerning for pneumonia. CT abdomen pelvis. The liver is of normal architecture. Gallbladder is partially distended. Spleen, pancreas, and the adrenals are normal.  There is heterogeneous enhancement of the kidneys with the areas of decreased attenuation concerning for pyelonephritis. Dilated fluid-filled small bowel loops are noted likely ileus/enteritis with small bowel loops measuring up to 2 cm. Pelvis. Bladder is partially distended with the Hampton catheter and the wall thickening. Colon is normal.  The appendix is partially identified and appears normal. Impression Heterogeneous enhancement of the kidneys concerning for multifocal pyelonephritis. There is also thickening of the urinary bladder. Please correlate with urinalysis. Dilated fluid-filled small bowel loops likely ileus/enteritis.      XR ABDOMEN FOR NG/OG/NE TUBE PLACEMENT    Result Date: 7/30/2022  EXAMINATION: ONE SUPINE XRAY VIEW(S) OF THE ABDOMEN 7/30/2022 5:43 pm COMPARISON: 07/30/2022 at 19:30 HISTORY: ORDERING SYSTEM PROVIDED HISTORY: Confirmation of course of NG/OG/NE tube and location of tip of tube TECHNOLOGIST PROVIDED HISTORY: Reason for exam:->Confirmation of course of NG/OG/NE tube and location of tip of tube Portable? ->Yes What reading provider will be dictating this exam?->CRC FINDINGS: Nasogastric tube is identified coursing below the diaphragm with the tip in the stomach. Endotracheal tube is noted. Lungs are stable in appearance. Adequately positioned nasogastric tube. MRI BRAIN WO CONTRAST    Result Date: 2022  EXAMINATION: MRI OF THE BRAIN WITHOUT CONTRAST  2022 9:36 pm TECHNIQUE: Multiplanar multisequence MRI of the brain was performed without the administration of intravenous contrast. COMPARISON: None. HISTORY: ORDERING SYSTEM PROVIDED HISTORY: r/o seizure TECHNOLOGIST PROVIDED HISTORY: Reason for exam:->r/o seizure What reading provider will be dictating this exam?->CRC FINDINGS: INTRACRANIAL STRUCTURES/VENTRICLES: There is no acute infarct. No mass effect or midline shift. No evidence of an acute intracranial hemorrhage. The ventricles and sulci are normal in size and configuration. The sellar/suprasellar regions appear unremarkable. The normal signal voids within the major intracranial vessels appear maintained. The medial temporal lobes are symmetric in size and signal intensity. ORBITS: The visualized portion of the orbits demonstrate no acute abnormality. SINUSES: The visualized paranasal sinuses and mastoid air cells demonstrate no acute abnormality. Mild linear mucosal thickening of the paranasal sinuses is noted. There are also trace bilateral mastoid effusions. BONES/SOFT TISSUES: The bone marrow signal intensity appears normal. The soft tissues demonstrate no acute abnormality. No acute intracranial abnormality visualized.      US DUP LOWER EXTREMITIES BILATERAL VENOUS    Result Date: 2022  Patient MRN:  [id number] : 1975 Age: 49 years Gender: Male Order Date:  2022 3:37 PM EXAM: US DUP LOWER EXTREMITIES BILATERAL VENOUS NUMBER OF IMAGES:  46 INDICATION:  rule out DVT. rule out DVT. What reading provider will be dictating this exam?->[facility] COMPARISON: None Within the visualized vessels, there is no evidence for deep venous thrombosis There is good compressibility, there is good augmentation, there is good color flow. Within the visualized vessels there is no evidence for deep venous thrombosis       Discharge Medications:      Medication List        START taking these medications      ascorbic acid 500 MG tablet  Commonly known as: VITAMIN C  Take 1 tablet by mouth in the morning. budesonide 90 MCG/ACT Aepb inhaler  Commonly known as: Pulmicort Flexhaler  Inhale 2 puffs into the lungs in the morning and 2 puffs in the evening. ipratropium 17 MCG/ACT inhaler  Commonly known as: Atrovent HFA  Inhale 1 puff into the lungs in the morning and 1 puff at noon and 1 puff before bedtime. pantoprazole 40 MG tablet  Commonly known as: PROTONIX  Take 1 tablet by mouth every morning (before breakfast)  Start taking on: August 5, 2022     vitamin D 50 MCG (2000 UT) Tabs tablet  Commonly known as: CHOLECALCIFEROL  Take 1 tablet by mouth in the morning.                Where to Get Your Medications        These medications were sent to [facility] [address] [address]      Phone: [phone]   ascorbic acid 500 MG tablet  budesonide 90 MCG/ACT Aepb inhaler  ipratropium 17 MCG/ACT inhaler  pantoprazole 40 MG tablet  vitamin D 50 MCG (2000 UT) Tabs tablet         Time Spent on discharge is more than 45 minutes in the examination, evaluation, counseling and review of medications and discharge plan.    +++++++++++++++++++++++++++++++++++++++++++++++++  Sulma Ibarra MD  [address]  +++++++++++++++++++++++++++++++++++++++++++++++++  NOTE: This report was transcribed using voice recognition software. Every effort was made to ensure accuracy; however, inadvertent computerized transcription errors may be present.

## 2022-08-04 NOTE — PROGRESS NOTES
CLINICAL PHARMACY NOTE: MEDS TO BEDS    Total # of Prescriptions Filled: 4   The following medications were delivered to the patient:  Vitamin c 500mg  Vitamin d 2000 unit  Pantoprazole 40mg  Amox-pot clav 875-125mg    Additional Documentation:  Delivered to [facility] 8/4

## 2022-08-04 NOTE — PLAN OF CARE
Problem: Discharge Planning  Goal: Discharge to home or other facility with appropriate resources  Outcome: Progressing     Problem: Pain  Goal: Verbalizes/displays adequate comfort level or baseline comfort level  Outcome: Progressing     Problem: Neurosensory - Adult  Goal: Achieves stable or improved neurological status  Outcome: Progressing  Goal: Absence of seizures  Outcome: Progressing  Goal: Remains free of injury related to seizures activity  Outcome: Progressing     Problem: Cardiovascular - Adult  Goal: Maintains optimal cardiac output and hemodynamic stability  Outcome: Progressing  Goal: Absence of cardiac dysrhythmias or at baseline  Outcome: Progressing     Problem: Skin/Tissue Integrity - Adult  Goal: Skin integrity remains intact  Outcome: Progressing  Goal: Incisions, wounds, or drain sites healing without S/S of infection  Outcome: Progressing  Goal: Oral mucous membranes remain intact  Outcome: Progressing     Problem: Musculoskeletal - Adult  Goal: Return mobility to safest level of function  Outcome: Progressing  Goal: Maintain proper alignment of affected body part  Outcome: Progressing  Goal: Return ADL status to a safe level of function  Outcome: Progressing     Problem: Gastrointestinal - Adult  Goal: Minimal or absence of nausea and vomiting  Outcome: Progressing  Goal: Maintains or returns to baseline bowel function  Outcome: Progressing  Goal: Maintains adequate nutritional intake  Outcome: Progressing  Goal: Establish and maintain optimal ostomy function  Outcome: Progressing     Problem: Genitourinary - Adult  Goal: Absence of urinary retention  Outcome: Progressing  Goal: Urinary catheter remains patent  Outcome: Progressing     Problem: Infection - Adult  Goal: Absence of infection at discharge  Outcome: Progressing  Goal: Absence of infection during hospitalization  Outcome: Progressing  Goal: Absence of fever/infection during anticipated neutropenic period  Outcome: Progressing Problem: Metabolic/Fluid and Electrolytes - Adult  Goal: Electrolytes maintained within normal limits  Outcome: Progressing  Goal: Hemodynamic stability and optimal renal function maintained  Outcome: Progressing  Goal: Glucose maintained within prescribed range  Outcome: Progressing     Problem: Hematologic - Adult  Goal: Maintains hematologic stability  Outcome: Progressing     Problem: ABCDS Injury Assessment  Goal: Absence of physical injury  Outcome: Progressing  Flowsheets (Taken 8/3/2022 2026 by Maia Chua RN)  Absence of Physical Injury: Implement safety measures based on patient assessment     Problem: Safety - Adult  Goal: Free from fall injury  Outcome: Progressing  Flowsheets (Taken 8/3/2022 2026 by Maia Chua RN)  Free From Fall Injury:   Instruct family/caregiver on patient safety   Based on caregiver fall risk screen, instruct family/caregiver to ask for assistance with transferring infant if caregiver noted to have fall risk factors     Problem: Skin/Tissue Integrity  Goal: Absence of new skin breakdown  Description: 1. Monitor for areas of redness and/or skin breakdown  2. Assess vascular access sites hourly  3. Every 4-6 hours minimum:  Change oxygen saturation probe site  4. Every 4-6 hours:  If on nasal continuous positive airway pressure, respiratory therapy assess nares and determine need for appliance change or resting period.   Outcome: Progressing     Problem: Nutrition Deficit:  Goal: Optimize nutritional status  Outcome: Progressing

## 2022-08-04 NOTE — CARE COORDINATION
Spoke with pt via room phone (d/t + covid); choiced for [facility], referral called to Deana Glass [facility] orders obtained, per Andrea pt can be seen within 48 hours so long as only therapy is ordered, notified Latrel of discharge home today as well. Update: Pt is not active with listed PCP, has not seen since 2019; pt updated no [facility] d/t no PCP; Latrel with [facility] aware; pt agreeable to set-up with PCP, charge RN Antonio Peterson notified of the aforementioned. The Plan for Transition of Care is related to the following treatment goals: medical stability     The Patient was provided with a choice of provider and agrees   with the discharge plan. [x] Yes [] No    Freedom of choice list was provided with basic dialogue that supports the patient's individualized plan of care/goals, treatment preferences and shares the quality data associated with the providers.  [x] Yes [] No

## 2022-08-05 ENCOUNTER — CARE COORDINATION (OUTPATIENT)
Dept: CARE COORDINATION | Age: 47
End: 2022-08-05

## 2022-08-05 NOTE — CARE COORDINATION
[facility] Transitions Initial Follow Up Call    Call within 2 business days of discharge: Yes    Patient: Nasim Weller Patient : 1975   MRN: <[id number]>  Reason for Admission: -22   Discharge Date: 22 RARS: Readmission Risk Score: 11.4      Last Discharge [facility]       Date Complaint Diagnosis Description Type Department Provider    22 Shortness of Breath; Chest Pain Boyd coma scale total score 3-8, at arrival to emergency department [facility]) . .. ED to Hosp-Admission (Discharged) (ADMITTED) [facility] 7WE Falguni Maier MD; Joleen Lowe . .. Date/Time:  2022 10:12 AM  Attempted to reach patient by telephone for Initial Covid Care Transition call. Call within 2 business days of discharge: Yes Left HIPPA compliant message requesting a return call. Will attempt to reach patient again. Pedro Veliz 33 / Aaron Osullivan Transition Team  [phone]    Facility:     START taking:  amoxicillin-clavulanate (AUGMENTIN)  ascorbic acid (VITAMIN C)  budesonide (Pulmicort Flexhaler)  ipratropium (Atrovent HFA)  pantoprazole (PROTONIX)  Start taking on: 2022  vitamin D (CHOLECALCIFEROL)    Care Transitions 24 Hour Call    Care Transitions Interventions         Follow Up  Future Appointments   Date Time Provider Kem Guthrie   2022 11:45 AM Tomasa Gillette  [facility],[address] Jina Argueta LPN

## 2022-08-06 LAB
BLOOD CULTURE, ROUTINE: NORMAL
CULTURE, BLOOD 2: NORMAL

## 2022-08-08 ENCOUNTER — CARE COORDINATION (OUTPATIENT)
Dept: CARE COORDINATION | Age: 47
End: 2022-08-08

## 2022-08-08 NOTE — CARE COORDINATION
[facility] Transitions Initial Follow Up Call    Call within 2 business days of discharge: Yes    Patient: Debo Enamorado Patient : 1975   MRN: <[id number]>  Reason for Admission:  -22 Acute Hypoxemic Respiratory Failure d/t Covid-19 Virus  Discharge Date: 22 RARS: Readmission Risk Score: 11.4      Last Discharge [facility]       Date Complaint Diagnosis Description Type Department Provider    22 Shortness of Breath; Chest Pain Nasreen coma scale total score 3-8, at arrival to emergency department [facility]) . .. ED to Hosp-Admission (Discharged) (ADMITTED) [facility] 7WE Elysia Damico MD; Agnieszka Corrales . .. Date/Time:  2022 12:02 PM  Second attempt made to reach patient by telephone. Call within 2 business days of discharge: Yes Left HIPPA compliant message requesting a return call. CTN will sign of.     Facility: [facility]    START taking:  amoxicillin-clavulanate (AUGMENTIN)  ascorbic acid (VITAMIN C)  budesonide (Pulmicort Flexhaler)  ipratropium (Atrovent HFA)  pantoprazole (PROTONIX)  Start taking on: 2022  vitamin D (CHOLECALCIFEROL)    Care Transitions 24 Hour Call    Care Transitions Interventions         Follow Up  Future Appointments   Date Time Provider Kem Guthrie   2022 11:45 AM Nguyen Guerrero  [facility],[address] John Guidry LPN

## 2022-09-12 NOTE — PROGRESS NOTES
[facility]  Department of Family Medicine      Patient:  Jose Luis Rankin 49 y.o. male     Date of Service: 9/12/22      Chief complaint:   Chief Complaint   Patient presents with    Follow-Up from Hospital     Post-Covid, back pain         History ofPresent Illness   The patient is a 49 y.o. male  presented to the clinic with complaints as above. Chest pain and SOB  -HFU  -found to have covid, need intubated due to hypoxia from covid and aspiration pneumonia, had concerns for seizures thus keppra started however EEG did not show seizures and MRI showed no acute process thus the keppra was stopped and neurology signed off, patient improved on steroids and abx and was eventually extubated  -did MRI of brain did not show any acute findings   -sent home on abx, finished this course  -currently, having a lot of lower back pain and right shoulder pain, found to have rotator cuff tear while in hospital   -sent home on ibuprofen, abx, and a patch for his lower back  -patches did help somewhat, ibuprofen help but wear off  -low back pain describes as throbbing pain in both sides of low back and stays localized   -having some tingling in his lower back   -denies any weakness or numbness  -denies any SOB or fever or chest pain   -will start PT this week for his back and shoulder   -besides the pain, does feel back to his baseline   -states he is not smoking anymore     Past Medical History:      Diagnosis Date    Bimalleolar fracture of left ankle 9/4/2014       PastSurgical History:        Procedure Laterality Date    ANKLE FRACTURE SURGERY Left 2011    FRACTURE SURGERY  9-18-14    ORIF Left Medial Maleous       Allergies:    Patient has no known allergies.     Social History:   Social History     Socioeconomic History    Marital status: Single     Spouse name: Not on file    Number of children: Not on file    Years of education: Not on file    Highest education level: Not on file Occupational History    Not on file   Tobacco Use    Smoking status: Some Days     Packs/day: 0.50     Years: 5.00     Pack years: 2.50     Types: Cigars, Cigarettes     Last attempt to quit: 2022     Years since quittin.1    Smokeless tobacco: Never   Substance and Sexual Activity    Alcohol use: No    Drug use: No    Sexual activity: Not on file   Other Topics Concern    Not on file   Social History Narrative    Not on file     Social Determinants of Health     Financial Resource Strain: Not on file   Food Insecurity: Not on file   Transportation Needs: Not on file   Physical Activity: Not on file   Stress: Not on file   Social Connections: Not on file   Intimate Partner Violence: Not on file   Housing Stability: Not on file        Family History:   No family history on file. Review of Systems:   Review of Systems - as above     Physical Exam   Vitals: /80   Pulse 85   Temp 97.3 °F (36.3 °C) (Infrared)   Resp 17   Ht 6' 2\" (1.88 m)   Wt 150 lb 6.4 oz (68.2 kg)   SpO2 99%   BMI 19.31 kg/m²   Physical Exam  Constitutional:       Appearance: He is well-developed. HENT:      Head: Normocephalic and atraumatic. Eyes:      General:         Right eye: No discharge. Left eye: No discharge. Conjunctiva/sclera: Conjunctivae normal.   Neck:      Trachea: No tracheal deviation. Cardiovascular:      Rate and Rhythm: Normal rate and regular rhythm. Heart sounds: Normal heart sounds. Pulmonary:      Effort: Pulmonary effort is normal. No respiratory distress. Breath sounds: No wheezing. Comments: Coarse breath sounds  Abdominal:      General: Bowel sounds are normal. There is no distension. Palpations: Abdomen is soft. Tenderness: no abdominal tenderness   Musculoskeletal:         General: Tenderness (resisting passive motion of right shoulder in abduction and flexion) present. Cervical back: Normal range of motion and neck supple.    Skin:     General: Skin is warm and dry. Neurological:      Mental Status: He is alert. Psychiatric:         Behavior: Behavior normal.           Assessment and Plan       1. Hospital discharge follow-up  For acute resp failure secondary to covid and aspiration pneumonia  -Symptoms improved drastically, and, currently, only symptoms he is having is pain which he will be seeing physical therapy for and I will start on treatment with tylenol and lidocaine patches as he already has NSAID, and referral to ortho for frozen shoulder secondary to rotator cuff tear, needs repeat CMP for mild hypokalemia, however otherwise is doing well and stable, close f/u in 1 month     2. Acute respiratory failure with hypoxia (HCC)  As above    3. COVID-19 virus detected  As above    4. Acute encephalopathy  As above    5. Adhesive capsulitis of right shoulder  As above  - [address], [address], [employer]    6. Nontraumatic tear of right rotator cuff, unspecified tear extent  As above  - [address], Orthopaedics, [employer]    7. Chronic midline low back pain without sciatica  As above  - lidocaine (LIDODERM) 5 %; Place 1 patch onto the skin daily 12 hours on, 12 hours off. Dispense: 30 patch; Refill: 0  - acetaminophen (TYLENOL) 500 MG tablet; Take 2 tablets by mouth 3 times daily as needed for Pain  Dispense: 180 tablet; Refill: 0    8. Hypokalemia  As above   - Comprehensive Metabolic Panel; Future    Of note, 40+ minutes was spent talking with the patient and reviewing his chart     Counseled regarding above diagnosis, including possible risks and complications,  especially if left uncontrolled. Counseled regarding the possible side effects, risks, benefits and alternatives to treatment;patient and/or guardian verbalizes understanding, agrees, feels comfortable with and wishes to proceed with above treatment plan.     Call or go to [address] [place] if symptoms worsen or persist. Advised patient to call with any new medication issues, and, as applicable, read all Rx info from pharmacy to assure aware of all possible risks and side effects of medicationbefore taking. Patient and/or guardian given opportunity to ask questions/raise concerns. The patient verbalized comfort and understanding ofinstructions. I encourage further reading and education about your health conditions. Information on many health conditions is provided by [facility] [employer]: [email or web address]  Please bring any questions to me at your nextvisit. Return to Office: Return in about 1 month (around 10/13/2022) for f/u shoulder pain and back pain . Medication List:    Current Outpatient Medications   Medication Sig Dispense Refill    lidocaine (LIDODERM) 5 % Place 1 patch onto the skin daily 12 hours on, 12 hours off. 30 patch 0    acetaminophen (TYLENOL) 500 MG tablet Take 2 tablets by mouth 3 times daily as needed for Pain 180 tablet 0    ipratropium (ATROVENT HFA) 17 MCG/ACT inhaler Inhale 1 puff into the lungs in the morning and 1 puff at noon and 1 puff before bedtime. 1 each 0    ascorbic acid (VITAMIN C) 500 MG tablet Take 1 tablet by mouth in the morning. 30 tablet 3    vitamin D (CHOLECALCIFEROL) 50 MCG (2000 UT) TABS tablet Take 1 tablet by mouth in the morning. 30 tablet 0    budesonide (PULMICORT FLEXHALER) 90 MCG/ACT AEPB inhaler Inhale 2 puffs into the lungs in the morning and 2 puffs in the evening. 1 each 0    pantoprazole (PROTONIX) 40 MG tablet Take 1 tablet by mouth every morning (before breakfast) 30 tablet 3     No current facility-administered medications for this visit. Manfred Kuhn, DO       This document may have been prepared at least partially through the use of voice recognition software. Although effort is taken to assure the accuracy ofthis document, it is possible that grammatical, syntax,  or spelling errors may occur.

## 2022-09-13 ENCOUNTER — OFFICE VISIT (OUTPATIENT)
Dept: PRIMARY CARE CLINIC | Age: 47
End: 2022-09-13
Payer: MEDICAID

## 2022-09-13 VITALS
BODY MASS INDEX: 19.3 KG/M2 | RESPIRATION RATE: 17 BRPM | SYSTOLIC BLOOD PRESSURE: 128 MMHG | WEIGHT: 150.4 LBS | OXYGEN SATURATION: 99 % | DIASTOLIC BLOOD PRESSURE: 80 MMHG | TEMPERATURE: 97.3 F | HEART RATE: 85 BPM | HEIGHT: 74 IN

## 2022-09-13 DIAGNOSIS — G89.29 CHRONIC MIDLINE LOW BACK PAIN WITHOUT SCIATICA: ICD-10-CM

## 2022-09-13 DIAGNOSIS — E87.6 HYPOKALEMIA: ICD-10-CM

## 2022-09-13 DIAGNOSIS — M75.01 ADHESIVE CAPSULITIS OF RIGHT SHOULDER: ICD-10-CM

## 2022-09-13 DIAGNOSIS — M75.101 NONTRAUMATIC TEAR OF RIGHT ROTATOR CUFF, UNSPECIFIED TEAR EXTENT: ICD-10-CM

## 2022-09-13 DIAGNOSIS — M54.50 CHRONIC MIDLINE LOW BACK PAIN WITHOUT SCIATICA: ICD-10-CM

## 2022-09-13 DIAGNOSIS — G93.40 ACUTE ENCEPHALOPATHY: ICD-10-CM

## 2022-09-13 DIAGNOSIS — U07.1 COVID-19 VIRUS DETECTED: ICD-10-CM

## 2022-09-13 DIAGNOSIS — J96.01 ACUTE RESPIRATORY FAILURE WITH HYPOXIA (HCC): ICD-10-CM

## 2022-09-13 DIAGNOSIS — Z09 HOSPITAL DISCHARGE FOLLOW-UP: Primary | ICD-10-CM

## 2022-09-13 LAB
ALBUMIN SERPL-MCNC: 4.2 G/DL (ref 3.5–5.2)
ALP BLD-CCNC: 108 U/L (ref 40–129)
ALT SERPL-CCNC: 29 U/L (ref 0–40)
ANION GAP SERPL CALCULATED.3IONS-SCNC: 11 MMOL/L (ref 7–16)
AST SERPL-CCNC: 21 U/L (ref 0–39)
BILIRUB SERPL-MCNC: <0.2 MG/DL (ref 0–1.2)
BUN BLDV-MCNC: 8 MG/DL (ref 6–20)
CALCIUM SERPL-MCNC: 9.5 MG/DL (ref 8.6–10.2)
CHLORIDE BLD-SCNC: 107 MMOL/L (ref 98–107)
CO2: 22 MMOL/L (ref 22–29)
CREAT SERPL-MCNC: 0.9 MG/DL (ref 0.7–1.2)
GFR AFRICAN AMERICAN: >60
GFR NON-AFRICAN AMERICAN: >60 ML/MIN/1.73
GLUCOSE BLD-MCNC: 80 MG/DL (ref 74–99)
POTASSIUM SERPL-SCNC: 4.5 MMOL/L (ref 3.5–5)
SODIUM BLD-SCNC: 140 MMOL/L (ref 132–146)
TOTAL PROTEIN: 7.4 G/DL (ref 6.4–8.3)

## 2022-09-13 PROCEDURE — 4004F PT TOBACCO SCREEN RCVD TLK: CPT | Performed by: STUDENT IN AN ORGANIZED HEALTH CARE EDUCATION/TRAINING PROGRAM

## 2022-09-13 PROCEDURE — 99215 OFFICE O/P EST HI 40 MIN: CPT | Performed by: STUDENT IN AN ORGANIZED HEALTH CARE EDUCATION/TRAINING PROGRAM

## 2022-09-13 PROCEDURE — G8420 CALC BMI NORM PARAMETERS: HCPCS | Performed by: STUDENT IN AN ORGANIZED HEALTH CARE EDUCATION/TRAINING PROGRAM

## 2022-09-13 PROCEDURE — G8427 DOCREV CUR MEDS BY ELIG CLIN: HCPCS | Performed by: STUDENT IN AN ORGANIZED HEALTH CARE EDUCATION/TRAINING PROGRAM

## 2022-09-13 RX ORDER — ACETAMINOPHEN 500 MG
1000 TABLET ORAL 3 TIMES DAILY PRN
Qty: 180 TABLET | Refills: 0 | Status: SHIPPED | OUTPATIENT
Start: 2022-09-13

## 2022-09-13 RX ORDER — LIDOCAINE 50 MG/G
1 PATCH TOPICAL DAILY
Qty: 30 PATCH | Refills: 0 | Status: SHIPPED | OUTPATIENT
Start: 2022-09-13 | End: 2022-10-13

## 2022-09-13 ASSESSMENT — PATIENT HEALTH QUESTIONNAIRE - PHQ9
SUM OF ALL RESPONSES TO PHQ QUESTIONS 1-9: 2
SUM OF ALL RESPONSES TO PHQ9 QUESTIONS 1 & 2: 2
1. LITTLE INTEREST OR PLEASURE IN DOING THINGS: 1
SUM OF ALL RESPONSES TO PHQ QUESTIONS 1-9: 2
2. FEELING DOWN, DEPRESSED OR HOPELESS: 1

## 2022-09-16 ENCOUNTER — TELEPHONE (OUTPATIENT)
Dept: ORTHOPEDIC SURGERY | Age: 47
End: 2022-09-16

## 2022-09-16 NOTE — TELEPHONE ENCOUNTER
Referral received from Ordering User: Alphonso Larson DO  For   Associated Diagnosis: Adhesive capsulitis of right shoulder (M75.01 [ICD-10-CM]); Nontraumatic tear of right rotator cuff, unspecified tear extent (M75.101 [ICD-10-CM])    Patient previously had a surgery with Dr. Stephen Quiroz;    DATE OF PROCEDURE:  09/18/2014  SURGEON:  Fany Durand M.D. OPERATION:  (1)  Open reduction internal fixation left medial malleolus. (2)   Stress of the syndesmosis under anesthesia, which was stable. However also saw Dr. Marcello Escalante in 2017 for Right Shoulder pain. Routed to Providers for consideration and guidance.

## 2022-09-19 NOTE — TELEPHONE ENCOUNTER
Pt called in to [facility] appt. LOV: 8/16/17 w/Dr. Keke Kessler for RT shoulder pain. Two no show appts. Is it okay to [facility] pt? Please, advise. Vangie Pulido can be reached at [phone]. Unable to reach office due to pt care.

## 2022-09-20 NOTE — TELEPHONE ENCOUNTER
Patient was called / voicemail left to call office to set up appointment     Referral Dr Deep Bergman - Adhesive capsulitis of right shoulder + Nontraumatic tear of right rotator cuff, unspecified tear extent    Next New Patient slot

## 2022-10-13 ENCOUNTER — OFFICE VISIT (OUTPATIENT)
Dept: PRIMARY CARE CLINIC | Age: 47
End: 2022-10-13
Payer: MEDICAID

## 2022-10-13 VITALS
RESPIRATION RATE: 17 BRPM | SYSTOLIC BLOOD PRESSURE: 132 MMHG | OXYGEN SATURATION: 99 % | HEIGHT: 74 IN | DIASTOLIC BLOOD PRESSURE: 80 MMHG | TEMPERATURE: 96.9 F | HEART RATE: 82 BPM | BODY MASS INDEX: 19.12 KG/M2 | WEIGHT: 149 LBS

## 2022-10-13 DIAGNOSIS — M54.50 ACUTE BILATERAL LOW BACK PAIN WITHOUT SCIATICA: ICD-10-CM

## 2022-10-13 DIAGNOSIS — M75.01 ADHESIVE CAPSULITIS OF RIGHT SHOULDER: Primary | ICD-10-CM

## 2022-10-13 PROCEDURE — G8420 CALC BMI NORM PARAMETERS: HCPCS | Performed by: STUDENT IN AN ORGANIZED HEALTH CARE EDUCATION/TRAINING PROGRAM

## 2022-10-13 PROCEDURE — 99213 OFFICE O/P EST LOW 20 MIN: CPT | Performed by: STUDENT IN AN ORGANIZED HEALTH CARE EDUCATION/TRAINING PROGRAM

## 2022-10-13 PROCEDURE — G8484 FLU IMMUNIZE NO ADMIN: HCPCS | Performed by: STUDENT IN AN ORGANIZED HEALTH CARE EDUCATION/TRAINING PROGRAM

## 2022-10-13 PROCEDURE — G8427 DOCREV CUR MEDS BY ELIG CLIN: HCPCS | Performed by: STUDENT IN AN ORGANIZED HEALTH CARE EDUCATION/TRAINING PROGRAM

## 2022-10-13 PROCEDURE — 4004F PT TOBACCO SCREEN RCVD TLK: CPT | Performed by: STUDENT IN AN ORGANIZED HEALTH CARE EDUCATION/TRAINING PROGRAM

## 2022-10-13 RX ORDER — CYCLOBENZAPRINE HCL 5 MG
5 TABLET ORAL NIGHTLY PRN
Qty: 30 TABLET | Refills: 0 | Status: SHIPPED | OUTPATIENT
Start: 2022-10-13 | End: 2022-10-23

## 2022-10-13 NOTE — PROGRESS NOTES
[facility]  Department of Family Medicine      Patient:  Christophe Lawrence 49 y.o. male     Date of Service: 22      Chief complaint:   Chief Complaint   Patient presents with    Joint Pain           History ofPresent Illness   The patient is a 49 y.o. male  presented to the clinic with complaints as above. Right shoulder pain  -f/u  -referral denied by ortho  -is taking ibuprofen and tylenol which helps somewhat  -is also having low back pain which is a new issue, started a couple months ago, pain is in the middle of his low back and feels like a tightness and feels so tight at times he cannot move, ibuprofen and tylenol help somewhat until they wear off  -denies any numbness or tingling   -MRI 3 years ago showed full thickness tear in distal supraspinatus   -denies any trauma to his back     Past Medical History:      Diagnosis Date    Bimalleolar fracture of left ankle 2014       PastSurgical History:        Procedure Laterality Date    ANKLE FRACTURE SURGERY Left 2011    FRACTURE SURGERY  14    ORIF Left Medial Maleous       Allergies:    Patient has no known allergies.     Social History:   Social History     Socioeconomic History    Marital status: Single     Spouse name: Not on file    Number of children: Not on file    Years of education: Not on file    Highest education level: Not on file   Occupational History    Not on file   Tobacco Use    Smoking status: Some Days     Packs/day: 0.50     Years: 5.00     Pack years: 2.50     Types: Cigars, Cigarettes     Last attempt to quit: 2022     Years since quittin.2    Smokeless tobacco: Never   Substance and Sexual Activity    Alcohol use: No    Drug use: No    Sexual activity: Not on file   Other Topics Concern    Not on file   Social History Narrative    Not on file     Social Determinants of Health     Financial Resource Strain: Not on file   Food Insecurity: Not on file   Transportation Needs: Not on file Physical Activity: Not on file   Stress: Not on file   Social Connections: Not on file   Intimate Partner Violence: Not on file   Housing Stability: Not on file        Family History:   No family history on file. Review of Systems:   Review of Systems - as above     Physical Exam   Vitals: /80   Pulse 82   Temp 96.9 °F (36.1 °C) (Infrared)   Resp 17   Ht 6' 2\" (1.88 m)   Wt 149 lb (67.6 kg)   SpO2 99%   BMI 19.13 kg/m²   Physical Exam  Constitutional:       Appearance: He is well-developed. HENT:      Head: Normocephalic and atraumatic. Eyes:      General:         Right eye: No discharge. Left eye: No discharge. Conjunctiva/sclera: Conjunctivae normal.   Neck:      Trachea: No tracheal deviation. Cardiovascular:      Rate and Rhythm: Normal rate and regular rhythm. Heart sounds: Normal heart sounds. Pulmonary:      Effort: Pulmonary effort is normal. No respiratory distress. Breath sounds: No wheezing. Comments: Coarse breath sounds  Abdominal:      General: Bowel sounds are normal. There is no distension. Palpations: Abdomen is soft. Tenderness: There is no abdominal tenderness. Musculoskeletal:         General: Tenderness (resisting passive motion of right shoulder in abduction and flexion, tenderness in low back with low back ROM testing) present. Cervical back: Normal range of motion and neck supple. Comments: Hypertonicity in lower back appreciated   Skin:     General: Skin is warm and dry. Neurological:      Mental Status: He is alert. Psychiatric:         Behavior: Behavior normal.           Assessment and Plan       1.  Adhesive capsulitis of right shoulder  F/u of chronic issue  -likely secondary to full thickness tear, could not get in to ortho, will refer to sports med for further evaluation, given he had full thickness tear unclear if he would respond well to PT???  - Dread Barrientos MD, Sports Medicine, [facility] Cynthia Lazcano    2. Acute bilateral low back pain without sciatica  New issue  -No specific trauma, given symptoms and PE findings, unlikely sciatica and most likely MSK, will treat supportively for now with muscle relaxer and PT and see if this improves his pain   - [facility] [place] [place]  - cyclobenzaprine (FLEXERIL) 5 MG tablet; Take 1 tablet by mouth nightly as needed for Muscle spasms  Dispense: 30 tablet; Refill: 0      Counseled regarding above diagnosis, including possible risks and complications,  especially if left uncontrolled. Counseled regarding the possible side effects, risks, benefits and alternatives to treatment;patient and/or guardian verbalizes understanding, agrees, feels comfortable with and wishes to proceed with above treatment plan. Call or go to [address] if symptoms worsen or persist. Advised patient to call with any new medication issues, and, as applicable, read all Rx info from pharmacy to assure aware of all possible risks and side effects of medicationbefore taking. Patient and/or guardian given opportunity to ask questions/raise concerns. The patient verbalized comfort and understanding ofinstructions. I encourage further reading and education about your health conditions. Information on many health conditions is provided by [facility] [employer]: [email or web address]  Please bring any questions to me at your nextvisit. Return to Office: Return in about 1 month (around 11/13/2022) for f/u back pain . Medication List:    Current Outpatient Medications   Medication Sig Dispense Refill    cyclobenzaprine (FLEXERIL) 5 MG tablet Take 1 tablet by mouth nightly as needed for Muscle spasms 30 tablet 0    lidocaine (LIDODERM) 5 % Place 1 patch onto the skin daily 12 hours on, 12 hours off.  30 patch 0    acetaminophen (TYLENOL) 500 MG tablet Take 2 tablets by mouth 3 times daily as needed for Pain 180 tablet 0 ipratropium (ATROVENT HFA) 17 MCG/ACT inhaler Inhale 1 puff into the lungs in the morning and 1 puff at noon and 1 puff before bedtime. 1 each 0    ascorbic acid (VITAMIN C) 500 MG tablet Take 1 tablet by mouth in the morning. 30 tablet 3    vitamin D (CHOLECALCIFEROL) 50 MCG (2000 UT) TABS tablet Take 1 tablet by mouth in the morning. 30 tablet 0    budesonide (PULMICORT FLEXHALER) 90 MCG/ACT AEPB inhaler Inhale 2 puffs into the lungs in the morning and 2 puffs in the evening. 1 each 0    pantoprazole (PROTONIX) 40 MG tablet Take 1 tablet by mouth every morning (before breakfast) 30 tablet 3     No current facility-administered medications for this visit. Mushtaq Gonzalez DO       This document may have been prepared at least partially through the use of voice recognition software. Although effort is taken to assure the accuracy ofthis document, it is possible that grammatical, syntax,  or spelling errors may occur.

## 2022-10-31 ENCOUNTER — TELEPHONE (OUTPATIENT)
Dept: ADMINISTRATIVE | Age: 47
End: 2022-10-31

## 2022-10-31 NOTE — TELEPHONE ENCOUNTER
Pt called in to r/s his missed appt from 10/26/22. Rambolori can be reached at [phone]. Thank you.

## 2022-11-02 ENCOUNTER — OFFICE VISIT (OUTPATIENT)
Dept: ORTHOPEDIC SURGERY | Age: 47
End: 2022-11-02
Payer: MEDICAID

## 2022-11-02 VITALS — HEIGHT: 74 IN | WEIGHT: 149 LBS | BODY MASS INDEX: 19.12 KG/M2

## 2022-11-02 DIAGNOSIS — M25.511 CHRONIC RIGHT SHOULDER PAIN: Primary | ICD-10-CM

## 2022-11-02 DIAGNOSIS — G89.29 CHRONIC RIGHT SHOULDER PAIN: Primary | ICD-10-CM

## 2022-11-02 DIAGNOSIS — M75.120 COMPLETE TEAR OF TENDON OF ROTATOR CUFF: ICD-10-CM

## 2022-11-02 PROCEDURE — 4004F PT TOBACCO SCREEN RCVD TLK: CPT | Performed by: FAMILY MEDICINE

## 2022-11-02 PROCEDURE — 99204 OFFICE O/P NEW MOD 45 MIN: CPT | Performed by: FAMILY MEDICINE

## 2022-11-02 PROCEDURE — G8420 CALC BMI NORM PARAMETERS: HCPCS | Performed by: FAMILY MEDICINE

## 2022-11-02 PROCEDURE — G8427 DOCREV CUR MEDS BY ELIG CLIN: HCPCS | Performed by: FAMILY MEDICINE

## 2022-11-02 PROCEDURE — G8484 FLU IMMUNIZE NO ADMIN: HCPCS | Performed by: FAMILY MEDICINE

## 2022-11-03 NOTE — PROGRESS NOTES
[facility]  PRIMARY CARE SPORTS MEDICINE  DATE OF VISIT : 11/3/2022    Patient : Claudetta Boss  Age : 49 y.o.   : 1975  MRN : [id number]   ______________________________________________________________________    Chief Complaint :   Chief Complaint   Patient presents with    Shoulder Pain     (R) shoulder pain. Patient states that about 5 months ago he was doing some housework and lifted up a door, has since been having shoulder pain. Patient had MRI on 10/15/2019 which showed full thickness RC tear with minimal retraction. HPI : Claudetta Boss is 49 y.o. male who presented to the clinic today for evaluation of right shoulder pain and weakness. The onset of the pain was several years ago, with recent exacerbation about 5 months ago while carrying a door. No history of dislocation. Current symptoms include pain and weakness with abduction/forward flexion. Symptoms are aggravated by repetitive use, work at or above shoulder height, and sleeping on affected side. Symptoms are diminished by avoiding aggravating activities. Patient has tried OTC medications and activity modification without significant relief. MRI from 10/15/2019 demonstrated full-thickness supraspinatus tear with minimal retraction. Past Medical History :  Past Medical History:   Diagnosis Date    Bimalleolar fracture of left ankle 2014     Past Surgical History:   Procedure Laterality Date    ANKLE FRACTURE SURGERY Left     FRACTURE SURGERY  14    ORIF Left Medial Maleous       Allergies :   No Known Allergies    Medication List :    Current Outpatient Medications   Medication Sig Dispense Refill    acetaminophen (TYLENOL) 500 MG tablet Take 2 tablets by mouth 3 times daily as needed for Pain 180 tablet 0    ipratropium (ATROVENT HFA) 17 MCG/ACT inhaler Inhale 1 puff into the lungs in the morning and 1 puff at noon and 1 puff before bedtime.  1 each 0    ascorbic acid (VITAMIN C) 500 MG tablet Take 1 tablet by mouth in the morning. 30 tablet 3    vitamin D (CHOLECALCIFEROL) 50 MCG (2000 UT) TABS tablet Take 1 tablet by mouth in the morning. 30 tablet 0    budesonide (PULMICORT FLEXHALER) 90 MCG/ACT AEPB inhaler Inhale 2 puffs into the lungs in the morning and 2 puffs in the evening. 1 each 0    pantoprazole (PROTONIX) 40 MG tablet Take 1 tablet by mouth every morning (before breakfast) 30 tablet 3     No current facility-administered medications for this visit.      ______________________________________________________________________    Physical Exam :    Vitals: Ht 6' 2\" (1.88 m)   Wt 149 lb (67.6 kg)   BMI 19.13 kg/m²   General Appearance: Nontoxic, awake, alert, and in no acute distress. Chest wall/Lung: Respirations regular and unlabored. No cyanosis. Heart: RRR, distal pulses intact. Neurologic: Alert&Oriented x3. Sensation grossly intact. No focal motor deficits detected. Musculokeletal: RIGHT Shoulder:  ROM: FF 90, ABD 90, ER 45, IR Greater Trochanter. No obvious bony deformity. No ecchymosis. TTP: ( - ) AC joint, ( + ) Biceps, ( - ) Coracoid, ( - ) Posterior Joint Line  Impingement Tests: ( + ) Rodriguez, ( + ) Neer's,  Labrum: ( + ) Beaumont's, ( + ) Speeds  Rotator cuff: ( + ) Full can, ( - ) Bear hug, ( - ) Belly press, ( - ) ER weakness   ______________________________________________________________________    Assessment & Plan :    1. Chronic right shoulder pain  2. Complete tear of tendon of rotator cuff  Patient presents to the office today for evaluation of right shoulder pain. History, referring provider note, physical exam and imaging (as interpreted by me) are consistent with complete tear of the right supraspinatus. Treatment options discussed with patient in the office today including activity modification, oral anti-inflammatories, physical therapy, injection options, advanced imaging in the form of a MRI and referral to an orthopedic surgeon for discussion of surgical opinion.  Due to

## 2022-11-10 ENCOUNTER — TELEPHONE (OUTPATIENT)
Dept: ORTHOPEDIC SURGERY | Age: 47
End: 2022-11-10

## 2022-11-10 NOTE — TELEPHONE ENCOUNTER
Returned patient call about scheduling MRI, patient called our office and not central scheduling number. Unable to LVM due to full box.

## 2022-12-04 ENCOUNTER — HOSPITAL ENCOUNTER (EMERGENCY)
Age: 47
Discharge: HOME | End: 2022-12-04
Attending: EMERGENCY MEDICINE
Payer: MEDICAID

## 2022-12-04 ENCOUNTER — APPOINTMENT (OUTPATIENT)
Dept: GENERAL RADIOLOGY | Age: 47
End: 2022-12-04
Payer: MEDICAID

## 2022-12-04 VITALS
OXYGEN SATURATION: 100 % | WEIGHT: 175 LBS | TEMPERATURE: 97.9 F | BODY MASS INDEX: 22.46 KG/M2 | SYSTOLIC BLOOD PRESSURE: 139 MMHG | HEIGHT: 74 IN | HEART RATE: 84 BPM | RESPIRATION RATE: 21 BRPM | DIASTOLIC BLOOD PRESSURE: 81 MMHG

## 2022-12-04 DIAGNOSIS — R07.9 ACUTE CHEST PAIN: Primary | ICD-10-CM

## 2022-12-04 DIAGNOSIS — R07.89 CHEST WALL PAIN: ICD-10-CM

## 2022-12-04 LAB
ALBUMIN SERPL-MCNC: 4 G/DL (ref 3.5–5.2)
ALP BLD-CCNC: 92 U/L (ref 40–129)
ALT SERPL-CCNC: 21 U/L (ref 0–40)
ANION GAP SERPL CALCULATED.3IONS-SCNC: 13 MMOL/L (ref 7–16)
AST SERPL-CCNC: 17 U/L (ref 0–39)
BILIRUB SERPL-MCNC: 0.3 MG/DL (ref 0–1.2)
BUN BLDV-MCNC: 10 MG/DL (ref 6–20)
CALCIUM SERPL-MCNC: 9.4 MG/DL (ref 8.6–10.2)
CHLORIDE BLD-SCNC: 103 MMOL/L (ref 98–107)
CO2: 21 MMOL/L (ref 22–29)
CREAT SERPL-MCNC: 0.9 MG/DL (ref 0.7–1.2)
D DIMER: <200 NG/ML DDU
GFR SERPL CREATININE-BSD FRML MDRD: >60 ML/MIN/1.73
GLUCOSE BLD-MCNC: 98 MG/DL (ref 74–99)
HCT VFR BLD CALC: 45.2 % (ref 37–54)
HEMOGLOBIN: 15.3 G/DL (ref 12.5–16.5)
MCH RBC QN AUTO: 29.3 PG (ref 26–35)
MCHC RBC AUTO-ENTMCNC: 33.8 % (ref 32–34.5)
MCV RBC AUTO: 86.4 FL (ref 80–99.9)
PDW BLD-RTO: 12.6 FL (ref 11.5–15)
PLATELET # BLD: 253 E9/L (ref 130–450)
PMV BLD AUTO: 9.5 FL (ref 7–12)
POTASSIUM SERPL-SCNC: 4 MMOL/L (ref 3.5–5)
RBC # BLD: 5.23 E12/L (ref 3.8–5.8)
SODIUM BLD-SCNC: 137 MMOL/L (ref 132–146)
TOTAL PROTEIN: 7.1 G/DL (ref 6.4–8.3)
TROPONIN, HIGH SENSITIVITY: 7 NG/L (ref 0–11)
TROPONIN, HIGH SENSITIVITY: <6 NG/L (ref 0–11)
WBC # BLD: 13.7 E9/L (ref 4.5–11.5)

## 2022-12-04 PROCEDURE — 93005 ELECTROCARDIOGRAM TRACING: CPT | Performed by: PHYSICIAN ASSISTANT

## 2022-12-04 PROCEDURE — 80053 COMPREHEN METABOLIC PANEL: CPT

## 2022-12-04 PROCEDURE — 99285 EMERGENCY DEPT VISIT HI MDM: CPT

## 2022-12-04 PROCEDURE — 71046 X-RAY EXAM CHEST 2 VIEWS: CPT

## 2022-12-04 PROCEDURE — 6360000002 HC RX W HCPCS: Performed by: EMERGENCY MEDICINE

## 2022-12-04 PROCEDURE — 85027 COMPLETE CBC AUTOMATED: CPT

## 2022-12-04 PROCEDURE — 84484 ASSAY OF TROPONIN QUANT: CPT

## 2022-12-04 PROCEDURE — 96375 TX/PRO/DX INJ NEW DRUG ADDON: CPT

## 2022-12-04 PROCEDURE — 85378 FIBRIN DEGRADE SEMIQUANT: CPT

## 2022-12-04 PROCEDURE — 6370000000 HC RX 637 (ALT 250 FOR IP): Performed by: EMERGENCY MEDICINE

## 2022-12-04 PROCEDURE — 36415 COLL VENOUS BLD VENIPUNCTURE: CPT

## 2022-12-04 PROCEDURE — 96374 THER/PROPH/DIAG INJ IV PUSH: CPT

## 2022-12-04 RX ORDER — ASPIRIN 81 MG/1
324 TABLET, CHEWABLE ORAL ONCE
Status: COMPLETED | OUTPATIENT
Start: 2022-12-04 | End: 2022-12-04

## 2022-12-04 RX ORDER — MORPHINE SULFATE 4 MG/ML
4 INJECTION, SOLUTION INTRAMUSCULAR; INTRAVENOUS ONCE
Status: COMPLETED | OUTPATIENT
Start: 2022-12-04 | End: 2022-12-04

## 2022-12-04 RX ORDER — KETOROLAC TROMETHAMINE 30 MG/ML
15 INJECTION, SOLUTION INTRAMUSCULAR; INTRAVENOUS ONCE
Status: COMPLETED | OUTPATIENT
Start: 2022-12-04 | End: 2022-12-04

## 2022-12-04 RX ADMIN — ASPIRIN 324 MG: 81 TABLET, CHEWABLE ORAL at 14:50

## 2022-12-04 RX ADMIN — MORPHINE SULFATE 4 MG: 4 INJECTION, SOLUTION INTRAMUSCULAR; INTRAVENOUS at 14:50

## 2022-12-04 RX ADMIN — KETOROLAC TROMETHAMINE 15 MG: 30 INJECTION, SOLUTION INTRAMUSCULAR; INTRAVENOUS at 14:49

## 2022-12-04 ASSESSMENT — PAIN SCALES - GENERAL
PAINLEVEL_OUTOF10: 10
PAINLEVEL_OUTOF10: 10

## 2022-12-04 ASSESSMENT — PAIN DESCRIPTION - LOCATION
LOCATION: CHEST
LOCATION: CHEST

## 2022-12-04 ASSESSMENT — PAIN DESCRIPTION - DESCRIPTORS
DESCRIPTORS: ACHING
DESCRIPTORS: SHARP

## 2022-12-04 ASSESSMENT — PAIN - FUNCTIONAL ASSESSMENT: PAIN_FUNCTIONAL_ASSESSMENT: 0-10

## 2022-12-04 ASSESSMENT — PAIN DESCRIPTION - ORIENTATION: ORIENTATION: MID

## 2022-12-04 NOTE — ED NOTES
Department of Emergency Medicine  FIRST PROVIDER TRIAGE NOTE             Independent MLP           12/4/22  1:16 PM EST    Date of Encounter: 12/4/22   MRN: [id number]      HPI: Hialry Foy is a 49 y.o. male who presents to the ED for Chest Pain (Midsternal chest pain that started this am, constant with no radiation noted. Aaox4. )   Midsternal chest pain radiating to both sides of the chest starting this morning. No prior cardiac history. ROS: Negative for abd pain or back pain. PE: Gen Appearance/Constitutional: alert  CV: regular rate  Pulm: CTA bilat     Initial Plan of Care: All treatment areas with department are currently occupied. Plan to order/Initiate the following while awaiting opening in ED: labs, EKG, and imaging studies.   Initiate Treatment-Testing, Proceed toTreatment Area When Bed Available for ED Attending/MLP to Continue Care    Electronically signed by SANDRITA Argueta   DD: 12/4/22       Jewels Argueta  12/04/22 2408

## 2022-12-04 NOTE — ED NOTES
The following labs were labeled with appropriate pt sticker and tubed to lab:     [x] Blue     [] Lavender   [] on ice  [x] Green/yellow  [] Green/black [] on ice  [] Roise Clack  [] on ice  [] Yellow  [] Red  [] Type/ Screen  [] ABG  [] VBG    [] COVID-19 swab    [] Rapid  [] PCR  [] Flu swab  [] Peds Viral Panel     [] Urine Sample  [] Fecal Sample  [] Pelvic Cultures  [] Blood Cultures  [] X 2  [] STREP Cultures                   [place], RN  12/04/22 3947

## 2022-12-05 LAB
EKG ATRIAL RATE: 80 BPM
EKG P AXIS: 92 DEGREES
EKG P-R INTERVAL: 110 MS
EKG Q-T INTERVAL: 376 MS
EKG QRS DURATION: 78 MS
EKG QTC CALCULATION (BAZETT): 433 MS
EKG R AXIS: 81 DEGREES
EKG T AXIS: 6 DEGREES
EKG VENTRICULAR RATE: 80 BPM

## 2022-12-05 PROCEDURE — 93010 ELECTROCARDIOGRAM REPORT: CPT | Performed by: INTERNAL MEDICINE

## 2022-12-05 NOTE — PROGRESS NOTES
Systems:   Review of Systems - as above     Physical Exam   Vitals: /60   Pulse 76   Temp 97.3 °F (36.3 °C) (Infrared)   Resp 18   Ht 6' 2\" (1.88 m)   Wt 174 lb (78.9 kg)   SpO2 99%   BMI 22.34 kg/m²   Physical Exam  Constitutional:       Appearance: He is well-developed. HENT:      Head: Normocephalic and atraumatic. Eyes:      General:         Right eye: No discharge. Left eye: No discharge. Conjunctiva/sclera: Conjunctivae normal.   Neck:      Trachea: No tracheal deviation. Cardiovascular:      Rate and Rhythm: Normal rate and regular rhythm. Heart sounds: Normal heart sounds. Pulmonary:      Effort: Pulmonary effort is normal. No respiratory distress. Breath sounds: Normal breath sounds. No wheezing. Abdominal:      General: Bowel sounds are normal. There is no distension. Palpations: Abdomen is soft. Tenderness: There is no abdominal tenderness. Musculoskeletal:         General: No tenderness. Cervical back: Normal range of motion and neck supple. Skin:     General: Skin is warm and dry. Neurological:      Mental Status: He is alert. Psychiatric:         Behavior: Behavior normal.           Assessment and Plan       1. Chest pain, unspecified type  ED f/u  -Pain resolved, will give NSAID and tylenol just in case pain comes back, advised any warning signs or symptoms go immediately to the ED   - ibuprofen (ADVIL;MOTRIN) 600 MG tablet; Take 1 tablet by mouth 3 times daily as needed for Pain  Dispense: 180 tablet; Refill: 0  - acetaminophen (TYLENOL) 500 MG tablet; Take 2 tablets by mouth 3 times daily as needed for Pain  Dispense: 90 tablet; Refill: 0    Counseled regarding above diagnosis, including possible risks and complications,  especially if left uncontrolled.  Counseled regarding the possible side effects, risks, benefits and alternatives to treatment;patient and/or guardian verbalizes understanding, agrees, feels comfortable with and wishes to proceed with above treatment plan. Call or go to [address] if symptoms worsen or persist. Advised patient to call with any new medication issues, and, as applicable, read all Rx info from pharmacy to assure aware of all possible risks and side effects of medicationbefore taking. Patient and/or guardian given opportunity to ask questions/raise concerns. The patient verbalized comfort and understanding ofinstructions. I encourage further reading and education about your health conditions. Information on many health conditions is provided by [facility] [employer]: [email or web address]. org/  Please bring any questions to me at your nextvisit. Return to Office: Return in about 6 months (around 6/6/2023) for annual physical .    Medication List:    Current Outpatient Medications   Medication Sig Dispense Refill    ibuprofen (ADVIL;MOTRIN) 600 MG tablet Take 1 tablet by mouth 3 times daily as needed for Pain 180 tablet 0    acetaminophen (TYLENOL) 500 MG tablet Take 2 tablets by mouth 3 times daily as needed for Pain 90 tablet 0     No current facility-administered medications for this visit. Corita Economy, DO       This document may have been prepared at least partially through the use of voice recognition software. Although effort is taken to assure the accuracy ofthis document, it is possible that grammatical, syntax,  or spelling errors may occur.

## 2022-12-06 ENCOUNTER — OFFICE VISIT (OUTPATIENT)
Dept: PRIMARY CARE CLINIC | Age: 47
End: 2022-12-06
Payer: MEDICAID

## 2022-12-06 VITALS
DIASTOLIC BLOOD PRESSURE: 60 MMHG | HEIGHT: 74 IN | SYSTOLIC BLOOD PRESSURE: 122 MMHG | TEMPERATURE: 97.3 F | RESPIRATION RATE: 18 BRPM | WEIGHT: 174 LBS | HEART RATE: 76 BPM | OXYGEN SATURATION: 99 % | BODY MASS INDEX: 22.33 KG/M2

## 2022-12-06 DIAGNOSIS — R07.9 CHEST PAIN, UNSPECIFIED TYPE: Primary | ICD-10-CM

## 2022-12-06 PROCEDURE — 4004F PT TOBACCO SCREEN RCVD TLK: CPT | Performed by: STUDENT IN AN ORGANIZED HEALTH CARE EDUCATION/TRAINING PROGRAM

## 2022-12-06 PROCEDURE — 99213 OFFICE O/P EST LOW 20 MIN: CPT | Performed by: STUDENT IN AN ORGANIZED HEALTH CARE EDUCATION/TRAINING PROGRAM

## 2022-12-06 PROCEDURE — G8427 DOCREV CUR MEDS BY ELIG CLIN: HCPCS | Performed by: STUDENT IN AN ORGANIZED HEALTH CARE EDUCATION/TRAINING PROGRAM

## 2022-12-06 PROCEDURE — G8420 CALC BMI NORM PARAMETERS: HCPCS | Performed by: STUDENT IN AN ORGANIZED HEALTH CARE EDUCATION/TRAINING PROGRAM

## 2022-12-06 PROCEDURE — G8484 FLU IMMUNIZE NO ADMIN: HCPCS | Performed by: STUDENT IN AN ORGANIZED HEALTH CARE EDUCATION/TRAINING PROGRAM

## 2022-12-06 RX ORDER — IBUPROFEN 600 MG/1
600 TABLET ORAL 3 TIMES DAILY PRN
Qty: 180 TABLET | Refills: 0 | Status: SHIPPED | OUTPATIENT
Start: 2022-12-06

## 2022-12-06 RX ORDER — ACETAMINOPHEN 500 MG
1000 TABLET ORAL 3 TIMES DAILY PRN
Qty: 90 TABLET | Refills: 0 | Status: SHIPPED | OUTPATIENT
Start: 2022-12-06

## 2022-12-19 ENCOUNTER — HOSPITAL ENCOUNTER (OUTPATIENT)
Dept: MRI IMAGING | Age: 47
Discharge: HOME | End: 2022-12-21
Payer: MEDICAID

## 2022-12-19 DIAGNOSIS — M75.120 COMPLETE TEAR OF TENDON OF ROTATOR CUFF: ICD-10-CM

## 2022-12-19 PROCEDURE — 73221 MRI JOINT UPR EXTREM W/O DYE: CPT

## 2023-01-04 ENCOUNTER — HOSPITAL ENCOUNTER (EMERGENCY)
Age: 48
Discharge: HOME | End: 2023-01-04
Payer: MEDICAID

## 2023-01-04 ENCOUNTER — APPOINTMENT (OUTPATIENT)
Dept: CT IMAGING | Age: 48
End: 2023-01-04
Payer: MEDICAID

## 2023-01-04 ENCOUNTER — APPOINTMENT (OUTPATIENT)
Dept: GENERAL RADIOLOGY | Age: 48
End: 2023-01-04
Payer: MEDICAID

## 2023-01-04 ENCOUNTER — TELEPHONE (OUTPATIENT)
Dept: ORTHOPEDIC SURGERY | Age: 48
End: 2023-01-04

## 2023-01-04 VITALS
WEIGHT: 174 LBS | OXYGEN SATURATION: 100 % | BODY MASS INDEX: 22.34 KG/M2 | RESPIRATION RATE: 16 BRPM | DIASTOLIC BLOOD PRESSURE: 89 MMHG | TEMPERATURE: 97.3 F | HEART RATE: 75 BPM | SYSTOLIC BLOOD PRESSURE: 138 MMHG

## 2023-01-04 DIAGNOSIS — G89.29 CHRONIC RIGHT SHOULDER PAIN: Primary | ICD-10-CM

## 2023-01-04 DIAGNOSIS — M25.511 CHRONIC RIGHT SHOULDER PAIN: Primary | ICD-10-CM

## 2023-01-04 DIAGNOSIS — S46.911A STRAIN OF RIGHT SHOULDER, INITIAL ENCOUNTER: Primary | ICD-10-CM

## 2023-01-04 DIAGNOSIS — M75.120 COMPLETE TEAR OF TENDON OF ROTATOR CUFF: ICD-10-CM

## 2023-01-04 DIAGNOSIS — S46.001A ROTATOR CUFF INJURY, RIGHT, INITIAL ENCOUNTER: ICD-10-CM

## 2023-01-04 PROCEDURE — 99284 EMERGENCY DEPT VISIT MOD MDM: CPT

## 2023-01-04 PROCEDURE — 71101 X-RAY EXAM UNILAT RIBS/CHEST: CPT

## 2023-01-04 PROCEDURE — 96372 THER/PROPH/DIAG INJ SC/IM: CPT

## 2023-01-04 PROCEDURE — 73030 X-RAY EXAM OF SHOULDER: CPT

## 2023-01-04 PROCEDURE — 70450 CT HEAD/BRAIN W/O DYE: CPT

## 2023-01-04 PROCEDURE — 72125 CT NECK SPINE W/O DYE: CPT

## 2023-01-04 PROCEDURE — 6360000002 HC RX W HCPCS: Performed by: PHYSICIAN ASSISTANT

## 2023-01-04 RX ORDER — ORPHENADRINE CITRATE 30 MG/ML
60 INJECTION INTRAMUSCULAR; INTRAVENOUS ONCE
Status: COMPLETED | OUTPATIENT
Start: 2023-01-04 | End: 2023-01-04

## 2023-01-04 RX ORDER — CYCLOBENZAPRINE HCL 10 MG
10 TABLET ORAL 2 TIMES DAILY PRN
Qty: 10 TABLET | Refills: 0 | Status: SHIPPED | OUTPATIENT
Start: 2023-01-04

## 2023-01-04 RX ORDER — KETOROLAC TROMETHAMINE 30 MG/ML
30 INJECTION, SOLUTION INTRAMUSCULAR; INTRAVENOUS ONCE
Status: COMPLETED | OUTPATIENT
Start: 2023-01-04 | End: 2023-01-04

## 2023-01-04 RX ADMIN — KETOROLAC TROMETHAMINE 30 MG: 30 INJECTION, SOLUTION INTRAMUSCULAR; INTRAVENOUS at 10:33

## 2023-01-04 RX ADMIN — ORPHENADRINE CITRATE 60 MG: 30 INJECTION INTRAMUSCULAR; INTRAVENOUS at 10:34

## 2023-01-04 ASSESSMENT — PAIN SCALES - GENERAL: PAINLEVEL_OUTOF10: 9

## 2023-01-04 NOTE — ED PROVIDER NOTES
Independent CANELO Visit. Department of Emergency Medicine   ED  Encounter Note  Admit Date/RoomTime: 2023 10:19 AM  ED Room: [place] [address]    NAME: Sushma Ríos  : 1975  MRN: [id number]     Chief Complaint:  Shoulder Injury (Right should injured yesterday while lifting a refrigerator )    History of Present Illness       Sushma Ríos is a 49 y.o. old male who presents to the emergency department by private vehicle, for traumatic Right shoulder pain which occured 1 day(s) prior to arrival.  The complaint is due to lifting a heavy object while at home. Patient  has a prior history of pain to mentioned area related to today's visit. He is right handed. The patients tetanus status is unknown. Since onset the symptoms have been persistent. His pain is aggraveated by any movement or any use of and relieved by nothing. He denies any head injury, loss of consciousness, confusion, dizziness, blurred vision, nausea, vomiting, fever, or chills. Patient states that he does have a history of complete tear of his tendon on his rotator cuff of his right arm. Patient states that he is to follow-up with orthopedics for that injury. Patient states that from the time of his diagnosis of his rotator cuff tendon tear to this current injury it has been approximately a month. Patient states that this pain he has now secondary to a refrigerator in which she was moving upstairs he had lost control and had fallen onto his arm and had taken him down to the stairs. Patient also admits to having some pain and tenderness with palpation of right thoracic area. Patient denies any midline thoracic spine tenderness. Patient denies any lumbar tenderness. Patient admits to cervical tenderness and has pain when he moves his head up and down left to right. Patient also admits to some mild paresthesia to his right arm. Patient admits to having sensation to light touch.   Patient denies elbow pain, wrist pain, hand or fingers pain of the right arm. Patient denies any other injuries. Patient admits to using alcohol. ROS   Pertinent positives and negatives are stated within HPI, all other systems reviewed and are negative. Past Medical History:  has a past medical history of Bimalleolar fracture of left ankle. Surgical History:  has a past surgical history that includes Ankle fracture surgery (Left, 2011) and fracture surgery (9-18-14). Social History:  reports that he has been smoking cigars and cigarettes. He has a 2.50 pack-year smoking history. He has never used smokeless tobacco. He reports that he does not drink alcohol and does not use drugs. Family History: family history is not on file. Allergies: Patient has no known allergies. Physical Exam   Oxygen Saturation Interpretation: Normal.        ED Triage Vitals   BP Temp Temp src Heart Rate Resp SpO2 Height Weight   01/04/23 0853 01/04/23 0847 -- 01/04/23 0847 01/04/23 0853 01/04/23 0847 -- 01/04/23 0847   138/89 97.3 °F (36.3 °C)  75 16 100 %  174 lb (78.9 kg)         Constitutional:  Alert, development consistent with age. Neck:  Normal ROM. Supple. Non-tender. Chest: Patient's right axilla Rib area tender to palpation back to scapulary area. There are no bruises swelling or step-offs noted  Physical Exam  Right Shoulder: anterior, posterior, lateral, AC joint. Tenderness: moderate              Swelling: None. Deformity: no deformity observed/palpated. ROM: diminished range with pain. Skin:  no wounds, erythema, or swelling. Neurovascular: Motor deficit: Limited motor function due to pain. Sensory deficit: none. Pulse deficit: none. Capillary refill: normal.    Right Elbow: No pain or injury noted            Tenderness:  none. Swelling: None. Deformity: no deformity observed/palpated.              ROM: full painless ROM. Skin:  no wounds, erythema, or swelling. Lymphatics: No lymphangitis or edema noted  Neurological:  Oriented. Motor functions intact. Lab / Imaging Results   (All laboratory and radiology results have been personally reviewed by myself)  Labs:  No results found for this visit on 01/04/23. Imaging: All Radiology results interpreted by Radiologist unless otherwise noted. CT CERVICAL SPINE WO CONTRAST   Final Result   1. There is no acute compression fracture or subluxation of the cervical   spine. 2. Multilevel degenerative disc and degenerative joint disease. .         CT HEAD WO CONTRAST   Final Result   1. There is no acute intracranial abnormality. Specifically, there is no   acute intracranial hemorrhage   2. Questionable posterior scalp contusion. Please correlate with the   patient's history. .         XR SHOULDER RIGHT (MIN 2 VIEWS)   Final Result   Unremarkable chest, right ribs and right shoulder. XR RIBS RIGHT INCLUDE CHEST (MIN 3 VIEWS)   Final Result   Unremarkable chest, right ribs and right shoulder. ED Course / Medical Decision Making     Medications   ketorolac (TORADOL) injection 30 mg (30 mg IntraMUSCular Given 1/4/23 1033)   orphenadrine (NORFLEX) injection 60 mg (60 mg IntraMUSCular Given 1/4/23 1034)        Re-examination:  1/4/23       Time: 1100   Patient given Toradol and Norflex for pain. Patient admits to relief. Patient still guarded with his range of motion. Consult(s):   Patient to follow-up with orthopedics      MDM:      Imaging was obtained based on moderate suspicion for fracture / bony abnormality, dislocation, joint effusion as per history/physical findings. Plan is subsequently for symptom control, limited use and immobilization with outpatient follow-up with pcp as instructed in d/c instructions. Patient presents to the ED for presents to the emergency department by private vehicle, for traumatic Right shoulder pain which occured 1 day(s) prior to arrival.  The complaint is due to lifting a heavy object while at home. Patient  has a prior history of pain to mentioned area related to today's visit. He is right handed. The patients tetanus status is unknown. Since onset the symptoms have been persistent. His pain is aggraveated by any movement or any use of and relieved by nothing. He denies any head injury, loss of consciousness, confusion, dizziness, blurred vision, nausea, vomiting, fever, or chills. Patient states that he does have a history of complete tear of his tendon on his rotator cuff of his right arm. Patient states that he is to follow-up with orthopedics for that injury. Patient states that from the time of his diagnosis of his rotator cuff tendon tear to this current injury it has been approximately a month. Patient states that this pain he has now secondary to a refrigerator in which she was moving upstairs he had lost control and had fallen onto his arm and had taken him down to the stairs. Patient also admits to having some pain and tenderness with palpation of right thoracic area. Patient denies any midline thoracic spine tenderness. Patient denies any lumbar tenderness. Patient admits to cervical tenderness and has pain when he moves his head up and down left to right. Patient also admits to some mild paresthesia to his right arm. Patient admits to having sensation to light touch. Patient denies elbow pain, wrist pain, hand or fingers pain of the right arm. Patient denies any other injuries. Patient admits to using alcohol. Differential diagnoses included but not limited to shoulder dislocation, rib fracture, humerus fracture, rib fracture, intracranial hemorrhage, acute on chronic rotator cuff tendon tear.  Workup in the ED revealed CT of cervical spine without contrast revealed no acute compression fracture or subluxation of cervical spine, multilevel degenerative disc and degenerative joint disease. CT of the head without contrast revealed no acute intracranial abnormality there is no acute intracranial hemorrhage, questionable posterior scalp contusion, correlate with patient's history. X-ray of the shoulder revealed unremarkable chest right ribs and shoulder. X-ray of the ribs revealed unremarkable chest right ribs and right shoulder. Patient was given Norflex 60 mg IM and Toradol 30 mg IM for their symptoms with mild improvement. Patient continues to be non-toxic on re-evaluation. Findings were discussed with the patient and reasons to immediately return to the ED were articulated to them. They will follow-up with their PMD and orthopedics. Plan of Care/Counseling:  SEVERINO Padilla Mt, CNP reviewed today's visit with the patient in addition to providing specific details for the plan of care and counseling regarding the diagnosis and prognosis. Questions are answered at this time and are agreeable with the plan. Assessment      1. Strain of right shoulder, initial encounter    2. Rotator cuff injury, right, initial encounter      Plan   Discharged home. Patient condition is good    New Medications     Discharge Medication List as of 1/4/2023 10:29 AM        START taking these medications    Details   cyclobenzaprine (FLEXERIL) 10 MG tablet Take 1 tablet by mouth 2 times daily as needed for Muscle spasms, Disp-10 tablet, R-0Normal           Electronically signed by SEVERINO Padilla Mt, CNP   DD: 1/4/23  **This report was transcribed using voice recognition software. Every effort was made to ensure accuracy; however, inadvertent computerized transcription errors may be present.   END OF ED PROVIDER NOTE         SEVERINO Hemphill CNP  01/04/23 3510

## 2023-01-04 NOTE — Clinical Note
Jerona Sever was seen and treated in our emergency department on 1/4/2023. He may return to work on 01/09/2023. If you have any questions or concerns, please don't hesitate to call.       SEVERINO Espinoza - CNP

## 2023-01-04 NOTE — TELEPHONE ENCOUNTER
Patient is established with Dr. Armando Ott, can see with Dr. Stephanie Fitch next new. Chronic rotator cuff tear.

## 2023-01-04 NOTE — TELEPHONE ENCOUNTER
VM rec'd from pt requesting ED f/u 1/4/23 TTS on call. No ED notes. Strain of right shoulder, initial encounter     Routing to provider for guidance.

## 2023-01-04 NOTE — TELEPHONE ENCOUNTER
Call to pt advised per review of ed documentation pt referred to Dr. Jamal Chairez. Gave address/ph#.     Routing to [employer] for scheduling.

## 2023-01-09 ENCOUNTER — OFFICE VISIT (OUTPATIENT)
Dept: ORTHOPEDIC SURGERY | Age: 48
End: 2023-01-09
Payer: MEDICAID

## 2023-01-09 VITALS — WEIGHT: 174 LBS | HEIGHT: 74 IN | BODY MASS INDEX: 22.33 KG/M2

## 2023-01-09 DIAGNOSIS — M75.120 COMPLETE TEAR OF TENDON OF ROTATOR CUFF: Primary | ICD-10-CM

## 2023-01-09 PROCEDURE — 99213 OFFICE O/P EST LOW 20 MIN: CPT | Performed by: FAMILY MEDICINE

## 2023-01-09 PROCEDURE — 4004F PT TOBACCO SCREEN RCVD TLK: CPT | Performed by: FAMILY MEDICINE

## 2023-01-09 PROCEDURE — G8427 DOCREV CUR MEDS BY ELIG CLIN: HCPCS | Performed by: FAMILY MEDICINE

## 2023-01-09 PROCEDURE — G8420 CALC BMI NORM PARAMETERS: HCPCS | Performed by: FAMILY MEDICINE

## 2023-01-09 PROCEDURE — G8484 FLU IMMUNIZE NO ADMIN: HCPCS | Performed by: FAMILY MEDICINE

## 2023-01-09 NOTE — PROGRESS NOTES
[facility]  PRIMARY CARE SPORTS MEDICINE  DATE OF VISIT : 2023    Patient : Haven Bull  Age : 49 y.o.   : 1975  MRN : [id number]   ______________________________________________________________________    Chief Complaint :   Chief Complaint   Patient presents with    Results     Patient is in office to go over (R) shoulder MRI. HPI : Haven Bull is 49 y.o. male who presented to the clinic today for follow-up of right shoulder MRI. MRI consistent with rotator cuff tear. Symptoms unchanged since previous visit. Past Medical History :  Past Medical History:   Diagnosis Date    Bimalleolar fracture of left ankle 2014     Past Surgical History:   Procedure Laterality Date    ANKLE FRACTURE SURGERY Left     FRACTURE SURGERY  14    ORIF Left Medial Maleous       Allergies :   No Known Allergies    Medication List :    Current Outpatient Medications   Medication Sig Dispense Refill    cyclobenzaprine (FLEXERIL) 10 MG tablet Take 1 tablet by mouth 2 times daily as needed for Muscle spasms 10 tablet 0    ibuprofen (ADVIL;MOTRIN) 600 MG tablet Take 1 tablet by mouth 3 times daily as needed for Pain 180 tablet 0    acetaminophen (TYLENOL) 500 MG tablet Take 2 tablets by mouth 3 times daily as needed for Pain 90 tablet 0     No current facility-administered medications for this visit.      ______________________________________________________________________    Physical Exam :    Vitals: Ht 6' 2\" (1.88 m)   Wt 174 lb (78.9 kg)   BMI 22.34 kg/m²   General Appearance: Nontoxic, awake, alert, and in no acute distress. Chest wall/Lung: Respirations regular and unlabored. No cyanosis. Heart: RRR, distal pulses intact. Neurologic: Alert&Oriented x3. Sensation grossly intact. No focal motor deficits detected.  ______________________________________________________________________    Assessment & Plan :    1.  Complete tear of tendon of rotator cuff  Patient presents to the office today for follow-up of right shoulder pain s/p right shoulder MRI. MRI consistent with rotator cuff tear. Patient wishes to discuss surgical options with orthopedic surgery. A referral to orthopedic surgery was placed in the office today. Patient is agreeable with the above plan all questions and concerns were addressed in the office today. - Traci Rogers MD, Orthopaedics, [id number] [facility]    Return to Office: No follow-ups on file.     Julian Haas MD

## 2023-01-11 ENCOUNTER — OFFICE VISIT (OUTPATIENT)
Dept: PRIMARY CARE CLINIC | Age: 48
End: 2023-01-11
Payer: MEDICAID

## 2023-01-11 VITALS
DIASTOLIC BLOOD PRESSURE: 82 MMHG | BODY MASS INDEX: 23.02 KG/M2 | HEIGHT: 74 IN | RESPIRATION RATE: 18 BRPM | OXYGEN SATURATION: 99 % | HEART RATE: 88 BPM | TEMPERATURE: 96.9 F | WEIGHT: 179.4 LBS | SYSTOLIC BLOOD PRESSURE: 120 MMHG

## 2023-01-11 DIAGNOSIS — M75.121 COMPLETE TEAR OF RIGHT ROTATOR CUFF, UNSPECIFIED WHETHER TRAUMATIC: Primary | ICD-10-CM

## 2023-01-11 DIAGNOSIS — F17.210 SMOKES CIGARETTES: ICD-10-CM

## 2023-01-11 PROBLEM — R56.9 CONVULSIONS, UNSPECIFIED CONVULSION TYPE (HCC): Status: ACTIVE | Noted: 2023-01-11

## 2023-01-11 PROCEDURE — G8427 DOCREV CUR MEDS BY ELIG CLIN: HCPCS | Performed by: STUDENT IN AN ORGANIZED HEALTH CARE EDUCATION/TRAINING PROGRAM

## 2023-01-11 PROCEDURE — G8420 CALC BMI NORM PARAMETERS: HCPCS | Performed by: STUDENT IN AN ORGANIZED HEALTH CARE EDUCATION/TRAINING PROGRAM

## 2023-01-11 PROCEDURE — G8484 FLU IMMUNIZE NO ADMIN: HCPCS | Performed by: STUDENT IN AN ORGANIZED HEALTH CARE EDUCATION/TRAINING PROGRAM

## 2023-01-11 PROCEDURE — 99213 OFFICE O/P EST LOW 20 MIN: CPT | Performed by: STUDENT IN AN ORGANIZED HEALTH CARE EDUCATION/TRAINING PROGRAM

## 2023-01-11 PROCEDURE — 4004F PT TOBACCO SCREEN RCVD TLK: CPT | Performed by: STUDENT IN AN ORGANIZED HEALTH CARE EDUCATION/TRAINING PROGRAM

## 2023-01-11 ASSESSMENT — PATIENT HEALTH QUESTIONNAIRE - PHQ9
4. FEELING TIRED OR HAVING LITTLE ENERGY: 3
SUM OF ALL RESPONSES TO PHQ QUESTIONS 1-9: 12
7. TROUBLE CONCENTRATING ON THINGS, SUCH AS READING THE NEWSPAPER OR WATCHING TELEVISION: 0
SUM OF ALL RESPONSES TO PHQ QUESTIONS 1-9: 12
8. MOVING OR SPEAKING SO SLOWLY THAT OTHER PEOPLE COULD HAVE NOTICED. OR THE OPPOSITE, BEING SO FIGETY OR RESTLESS THAT YOU HAVE BEEN MOVING AROUND A LOT MORE THAN USUAL: 0
SUM OF ALL RESPONSES TO PHQ QUESTIONS 1-9: 12
10. IF YOU CHECKED OFF ANY PROBLEMS, HOW DIFFICULT HAVE THESE PROBLEMS MADE IT FOR YOU TO DO YOUR WORK, TAKE CARE OF THINGS AT HOME, OR GET ALONG WITH OTHER PEOPLE: 0
3. TROUBLE FALLING OR STAYING ASLEEP: 3
6. FEELING BAD ABOUT YOURSELF - OR THAT YOU ARE A FAILURE OR HAVE LET YOURSELF OR YOUR FAMILY DOWN: 0
9. THOUGHTS THAT YOU WOULD BE BETTER OFF DEAD, OR OF HURTING YOURSELF: 0
5. POOR APPETITE OR OVEREATING: 2
SUM OF ALL RESPONSES TO PHQ QUESTIONS 1-9: 12
SUM OF ALL RESPONSES TO PHQ9 QUESTIONS 1 & 2: 4
1. LITTLE INTEREST OR PLEASURE IN DOING THINGS: 3
2. FEELING DOWN, DEPRESSED OR HOPELESS: 1

## 2023-01-11 NOTE — LETTER
[facility] PRIMARY CARE  [address]  [place] [address]  Phone: [phone]  Fax: [address] [place] [address],         January 11, 2023     Patient: Sabine Acuna   YOB: 1975   Date of Visit: 1/11/2023       To Whom It May Concern: It is my medical opinion that Aline Pedraza may return to work on 1/11/2023 with the following restrictions: should not use his right arm and shoulder . If you have any questions or concerns, please don't hesitate to call.     Sincerely,        Melvi Hastings, DO

## 2023-01-11 NOTE — PROGRESS NOTES
[facility]  Department of Family Medicine      Patient:  Bailey Laughlin 49 y.o. male     Date of Service: 23      Chief complaint:   Chief Complaint   Patient presents with    ED Follow-up         History ofPresent Illness   The patient is a 49 y.o. male  presented to the clinic with complaints as above. Right rotator cuff tear  -ED f/u  -torn before his new issue  -did see sports med who referred him to ortho  -currently, pain has been mild to moderate, medications help somewhat   -seeing ortho on    -wants to go back to work     Smoking  -smoking 4 cigarettes a day       Past Medical History:      Diagnosis Date    Bimalleolar fracture of left ankle 2014       PastSurgical History:        Procedure Laterality Date    ANKLE FRACTURE SURGERY Left     FRACTURE SURGERY  14    ORIF Left Medial Maleous       Allergies:    Patient has no known allergies.     Social History:   Social History     Socioeconomic History    Marital status: Single     Spouse name: Not on file    Number of children: Not on file    Years of education: Not on file    Highest education level: Not on file   Occupational History    Not on file   Tobacco Use    Smoking status: Some Days     Packs/day: 0.50     Years: 5.00     Pack years: 2.50     Types: Cigars, Cigarettes     Last attempt to quit: 2022     Years since quittin.4    Smokeless tobacco: Never   Vaping Use    Vaping Use: Never used   Substance and Sexual Activity    Alcohol use: No    Drug use: No    Sexual activity: Not on file   Other Topics Concern    Not on file   Social History Narrative    Not on file     Social Determinants of Health     Financial Resource Strain: Not on file   Food Insecurity: Not on file   Transportation Needs: Not on file   Physical Activity: Not on file   Stress: Not on file   Social Connections: Not on file   Intimate Partner Violence: Not on file   Housing Stability: Not on file Family History:   No family history on file. Review of Systems:   Review of Systems - as above     Physical Exam   Vitals: /82   Pulse 88   Temp 96.9 °F (36.1 °C) (Infrared)   Resp 18   Ht 6' 2\" (1.88 m)   Wt 179 lb 6.4 oz (81.4 kg)   SpO2 99%   BMI 23.03 kg/m²   Physical Exam  Constitutional:       Appearance: He is well-developed. HENT:      Head: Normocephalic and atraumatic. Eyes:      General:         Right eye: No discharge. Left eye: No discharge. Conjunctiva/sclera: Conjunctivae normal.   Neck:      Trachea: No tracheal deviation. Cardiovascular:      Rate and Rhythm: Normal rate and regular rhythm. Heart sounds: Normal heart sounds. Pulmonary:      Effort: Pulmonary effort is normal. No respiratory distress. Breath sounds: Normal breath sounds. No wheezing. Abdominal:      General: Bowel sounds are normal. There is no distension. Palpations: Abdomen is soft. Tenderness: There is no abdominal tenderness. Musculoskeletal:         General: Tenderness present. Cervical back: Normal range of motion and neck supple. Comments: Right shoulder in sling   Skin:     General: Skin is warm and dry. Neurological:      Mental Status: He is alert. Psychiatric:         Behavior: Behavior normal.           Assessment and Plan       1. Complete tear of right rotator cuff, unspecified whether traumatic  ED f/u  -Pain improves with medication, advised he take something every 4 hours to stay on top of the pain and encouraged he see the specialist as this will likely need surgery  -Advised he stop smoking altogether     2. Smokes cigarettes  As above     Counseled regarding above diagnosis, including possible risks and complications,  especially if left uncontrolled.  Counseled regarding the possible side effects, risks, benefits and alternatives to treatment;patient and/or guardian verbalizes understanding, agrees, feels comfortable with and wishes to proceed with above treatment plan. Call or go to [address] [place] if symptoms worsen or persist. Advised patient to call with any new medication issues, and, as applicable, read all Rx info from pharmacy to assure aware of all possible risks and side effects of medicationbefore taking. Patient and/or guardian given opportunity to ask questions/raise concerns. The patient verbalized comfort and understanding ofinstructions. I encourage further reading and education about your health conditions. Information on many health conditions is provided by [facility] [employer]: [email or web address]  Please bring any questions to me at your nextvisit. Return to Office: Return in about 1 month (around 2/11/2023) for f/u right shoulder . Medication List:    Current Outpatient Medications   Medication Sig Dispense Refill    cyclobenzaprine (FLEXERIL) 10 MG tablet Take 1 tablet by mouth 2 times daily as needed for Muscle spasms 10 tablet 0    ibuprofen (ADVIL;MOTRIN) 600 MG tablet Take 1 tablet by mouth 3 times daily as needed for Pain 180 tablet 0    acetaminophen (TYLENOL) 500 MG tablet Take 2 tablets by mouth 3 times daily as needed for Pain 90 tablet 0     No current facility-administered medications for this visit. Reggie Quinonez, DO       This document may have been prepared at least partially through the use of voice recognition software. Although effort is taken to assure the accuracy ofthis document, it is possible that grammatical, syntax,  or spelling errors may occur.

## 2023-01-31 ENCOUNTER — COMMUNITY OUTREACH (OUTPATIENT)
Dept: PRIMARY CARE CLINIC | Age: 48
End: 2023-01-31

## 2023-02-01 ENCOUNTER — OFFICE VISIT (OUTPATIENT)
Dept: ORTHOPEDIC SURGERY | Age: 48
End: 2023-02-01
Payer: MEDICAID

## 2023-02-01 VITALS — BODY MASS INDEX: 22.2 KG/M2 | WEIGHT: 173 LBS | HEIGHT: 74 IN

## 2023-02-01 DIAGNOSIS — M75.120 COMPLETE TEAR OF TENDON OF ROTATOR CUFF: Primary | ICD-10-CM

## 2023-02-01 PROCEDURE — 4004F PT TOBACCO SCREEN RCVD TLK: CPT | Performed by: ORTHOPAEDIC SURGERY

## 2023-02-01 PROCEDURE — G8420 CALC BMI NORM PARAMETERS: HCPCS | Performed by: ORTHOPAEDIC SURGERY

## 2023-02-01 PROCEDURE — G8427 DOCREV CUR MEDS BY ELIG CLIN: HCPCS | Performed by: ORTHOPAEDIC SURGERY

## 2023-02-01 PROCEDURE — 99204 OFFICE O/P NEW MOD 45 MIN: CPT | Performed by: ORTHOPAEDIC SURGERY

## 2023-02-01 PROCEDURE — G8484 FLU IMMUNIZE NO ADMIN: HCPCS | Performed by: ORTHOPAEDIC SURGERY

## 2023-02-01 NOTE — PROGRESS NOTES
New Shoulder Patient Visit     Referring Provider:   Amarjit Hawk MD  [address]    CHIEF COMPLAINT:   Chief Complaint   Patient presents with    Shoulder Pain     Ref : Dr Elena Saucedo - Right shoulder impingement and possible calcific tendinitis        HPI:      Stanford Garcia is a 49y.o. year old male who is seen today  for evaluation of right shoulder pain. Patient has been dealing with chronic symptoms ongoing for several years now. He has had 2 separate injuries over the last year. Most recent injury occurred 1 month ago where he was trying to carry a refrigerator up the stairs and it fell onto his right arm. He was seen and treated recently in the emergency department. Was seen by Dr. Elena Saucedo in early November MRI obtained in mid December showed evidence of a full-thickness rotator cuff tear. Presents to the office wearing a sling today. He reports pain with daily activities. Reports weakness and limited range of motion of the right arm. He is right-hand dominant. PAST MEDICAL HISTORY  Past Medical History:   Diagnosis Date    Bimalleolar fracture of left ankle 2014       PAST SURGICAL HISTORY  Past Surgical History:   Procedure Laterality Date    ANKLE FRACTURE SURGERY Left     FRACTURE SURGERY  14    ORIF Left Medial Maleous       FAMILY HISTORY   No family history on file.     SOCIAL HISTORY  Social History     Occupational History    Not on file   Tobacco Use    Smoking status: Some Days     Packs/day: 0.50     Years: 5.00     Pack years: 2.50     Types: Cigars, Cigarettes     Last attempt to quit: 2022     Years since quittin.5    Smokeless tobacco: Never   Vaping Use    Vaping Use: Never used   Substance and Sexual Activity    Alcohol use: No    Drug use: No    Sexual activity: Not on file       CURRENT MEDICATIONS     Current Outpatient Medications:     cyclobenzaprine (FLEXERIL) 10 MG tablet, Take 1 tablet by mouth 2 times daily as needed for Muscle spasms, Disp: 10 tablet, Rfl: 0    ibuprofen (ADVIL;MOTRIN) 600 MG tablet, Take 1 tablet by mouth 3 times daily as needed for Pain, Disp: 180 tablet, Rfl: 0    acetaminophen (TYLENOL) 500 MG tablet, Take 2 tablets by mouth 3 times daily as needed for Pain, Disp: 90 tablet, Rfl: 0    ALLERGIES  No Known Allergies    Controlled Substances Monitoring:        REVIEW OF SYSTEMS:     Constitutional:  Negative for weight loss, fevers, chills, fatigue  Cardiovascular: Negative for chest pain, palpitations  Pulmonary: Negative for shortness of breath, labored breathing, cough  GI: negative for abdominal pain, nausea, vomitting   MSK: per HPI  Skin: negative for rash, open wounds    All other systems reviewed and are negative           PHYSICAL EXAM     Vitals:    02/01/23 1423 02/01/23 1424   Weight: 175 lb (79.4 kg) 173 lb (78.5 kg)   Height: 6' 2\" (1.88 m) 6' 2\" (1.88 m)       Height: 6' 2\" (1.88 m)  Weight: 175 lb per pt BMI:  Body mass index is 22.21 kg/m². General: The patient is alert and oriented x 3, appears to be stated age and in no distress. HEENT: head is normocephalic, atraumatic. EOMI. Neck: supple, trachea midline, no thyromegaly   Cardiovascular: peripheral pulses palpable. Normal Capillary refill   Respiratory: breathing unlabored, chest expansion symmetric   Skin: no rash, no open wounds, no erythema  Psych: normal affect; mood stable  Neurologic: gait normal, sensation grossly intact in extremities  MSK:    Cervical Spine: There is no tenderness to palpation along the cervical spine. Range of motion is normal.  Spurling's is negative      Shoulder Exam:   Exam of the right shoulder displays active range of motion 50/30/hip. Passive range of motion to 90 degrees. There is weakness against gravity displayed with active motion. Speeds test intact. Patient unable to perform Arapahoe's or Jobes test due to current level of pain.   Generalized tenderness to the anterior and posterior shoulder on palpation.    IMAGING:     No new imaging was obtained today.  Recent x-rays reviewed showing no evidence of acute fracture dislocation.  Recent MRI showing full-thickness tear involving the posterior aspect of the supraspinatus which was present in his 2019 MRI.  Mild atrophy present with retraction to mid humeral head.  Signal within the upper border of the subscapularis indicating possible partial-thickness tear.  Biceps appears within the bicipital groove.    Radiographic findings reviewed with patient      ASSESSMENT   Right shoulder full-thickness rotator cuff tear      PLAN  Today we discussed the right shoulder.  Patient has a chronic rotator cuff tear seen on previous MRI from 2019.  Most recent MRI from 1 month ago reviewed showing mild atrophy to mid humeral head.  He has failed conservative treatment and has limited strength and mobility of his shoulder.  For these reasons he wishes to proceed with surgical management.  Surgery will involve a right shoulder arthroscopy rotator cuff repair versus debridement, possible biceps tenotomy versus tenodesis.  We will look to have him scheduled in the near future.  He does not require a preoperative appointment.        DANICA Hdz  Orthopedic Surgery   02/01/23  4:22 PM    Staff Addendum    I have seen and evaluated the patient and agree with the assessment and plan as documented by Giovanny Danielle CNP. I have performed the key components of the history and physical examination and concur with the findings and plan, and have made changes where appropriate/necessary.      Agree with above.  He has a chronic full-thickness tear from at least 2019 when he has a previous MRI.  Current MRI shows a more posterior based supraspinatus/infraspinatus tear with retraction to the mid humeral head and moderate atrophy.  He has very limited exam due to pain and poor function today.  He has been dealing with this for quite some time.  Given  the full nature of his tear he was offered surgical management in the form of right shoulder arthroscopy, rotator cuff repair, possible biceps tenotomy versus tenodesis. Risks of surgery were discussed in detail including but not limited to infection, neurovascular injury, retear, irreparable tear, DVT/pulmonary embolus, death from anesthesia, unforeseen risks. He understands would like to proceed. He will require medical clearance prior to scheduling for surgery.       Shawna Williamson MD  [address] [place] [address]

## 2023-02-03 ENCOUNTER — OFFICE VISIT (OUTPATIENT)
Dept: PRIMARY CARE CLINIC | Age: 48
End: 2023-02-03
Payer: MEDICAID

## 2023-02-03 VITALS
HEART RATE: 76 BPM | SYSTOLIC BLOOD PRESSURE: 118 MMHG | RESPIRATION RATE: 18 BRPM | OXYGEN SATURATION: 99 % | HEIGHT: 74 IN | BODY MASS INDEX: 21.56 KG/M2 | TEMPERATURE: 96.9 F | DIASTOLIC BLOOD PRESSURE: 72 MMHG | WEIGHT: 168 LBS

## 2023-02-03 DIAGNOSIS — Z01.818 PRE-OP EXAM: Primary | ICD-10-CM

## 2023-02-03 PROCEDURE — 4004F PT TOBACCO SCREEN RCVD TLK: CPT | Performed by: STUDENT IN AN ORGANIZED HEALTH CARE EDUCATION/TRAINING PROGRAM

## 2023-02-03 PROCEDURE — G8484 FLU IMMUNIZE NO ADMIN: HCPCS | Performed by: STUDENT IN AN ORGANIZED HEALTH CARE EDUCATION/TRAINING PROGRAM

## 2023-02-03 PROCEDURE — G8420 CALC BMI NORM PARAMETERS: HCPCS | Performed by: STUDENT IN AN ORGANIZED HEALTH CARE EDUCATION/TRAINING PROGRAM

## 2023-02-03 PROCEDURE — 99214 OFFICE O/P EST MOD 30 MIN: CPT | Performed by: STUDENT IN AN ORGANIZED HEALTH CARE EDUCATION/TRAINING PROGRAM

## 2023-02-03 PROCEDURE — G8427 DOCREV CUR MEDS BY ELIG CLIN: HCPCS | Performed by: STUDENT IN AN ORGANIZED HEALTH CARE EDUCATION/TRAINING PROGRAM

## 2023-02-03 NOTE — PROGRESS NOTES
[facility]  Department of Family Medicine      Patient:  D[place] Parent 49 y.o. male     Date of Service: 2/3/23      Chief complaint:   Chief Complaint   Patient presents with    Pre-op Exam         History ofPresent Illness   The patient is a 49 y.o. male  presented to the clinic with complaints as above. Right shoulder surgery  -pre op  -denies hx of heart disease or heart attack  -denies any chest pain or SOB  -denies any BRYANT with walking up stairs   -had ankle surgery in  and this went ok, no complications, did ok with anesthesia   -does not smoke     Past Medical History:      Diagnosis Date    Bimalleolar fracture of left ankle 2014       PastSurgical History:        Procedure Laterality Date    ANKLE FRACTURE SURGERY Left 2011    FRACTURE SURGERY  14    ORIF Left Medial Maleous       Allergies:    Patient has no known allergies.     Social History:   Social History     Socioeconomic History    Marital status: Single     Spouse name: Not on file    Number of children: Not on file    Years of education: Not on file    Highest education level: Not on file   Occupational History    Not on file   Tobacco Use    Smoking status: Some Days     Packs/day: 0.50     Years: 5.00     Pack years: 2.50     Types: Cigars, Cigarettes     Last attempt to quit: 2022     Years since quittin.5    Smokeless tobacco: Never   Vaping Use    Vaping Use: Never used   Substance and Sexual Activity    Alcohol use: No    Drug use: No    Sexual activity: Not on file   Other Topics Concern    Not on file   Social History Narrative    Not on file     Social Determinants of Health     Financial Resource Strain: Not on file   Food Insecurity: Not on file   Transportation Needs: Not on file   Physical Activity: Not on file   Stress: Not on file   Social Connections: Not on file   Intimate Partner Violence: Not on file   Housing Stability: Not on file        Family History:   No family history on file. Review of Systems:   Review of Systems - as above     Physical Exam   Vitals: /72   Pulse 76   Temp 96.9 °F (36.1 °C) (Infrared)   Resp 18   Ht 6' 2\" (1.88 m)   Wt 168 lb (76.2 kg)   SpO2 99%   BMI 21.57 kg/m²   Physical Exam  Constitutional:       Appearance: He is well-developed. HENT:      Head: Normocephalic and atraumatic. Eyes:      General:         Right eye: No discharge. Left eye: No discharge. Conjunctiva/sclera: Conjunctivae normal.   Neck:      Trachea: No tracheal deviation. Cardiovascular:      Rate and Rhythm: Normal rate and regular rhythm. Heart sounds: Normal heart sounds. Pulmonary:      Effort: Pulmonary effort is normal. No respiratory distress. Breath sounds: Normal breath sounds. No wheezing. Abdominal:      General: Bowel sounds are normal. There is no distension. Palpations: Abdomen is soft. Tenderness: There is no abdominal tenderness. Musculoskeletal:         General: No tenderness. Cervical back: Normal range of motion and neck supple. Skin:     General: Skin is warm and dry. Neurological:      Mental Status: He is alert. Psychiatric:         Behavior: Behavior normal.           Assessment and Plan       1. Pre-op exam  FOr right shoulder surgery  -Has not been scheduled yet, however patient's vitals are stable, no hx of heart attack or heart disease, no issues walking up stairs, recent labs and EKG looked ok, no need to repeat, given all this, patient seems to be a low risk candidate for a low-moderate risk procedure    Counseled regarding above diagnosis, including possible risks and complications,  especially if left uncontrolled. Counseled regarding the possible side effects, risks, benefits and alternatives to treatment;patient and/or guardian verbalizes understanding, agrees, feels comfortable with and wishes to proceed with above treatment plan.     Call or go to EDimmediately if symptoms worsen or persist. Advised patient to call with any new medication issues, and, as applicable, read all Rx info from pharmacy to assure aware of all possible risks and side effects of medicationbefore taking. Patient and/or guardian given opportunity to ask questions/raise concerns. The patient verbalized comfort and understanding ofinstructions. I encourage further reading and education about your health conditions. Information on many health conditions is provided by [facility] [employer]: [email or web address]  Please bring any questions to me at your nextvisit. Return to Office: Return if symptoms worsen or fail to improve. Medication List:    Current Outpatient Medications   Medication Sig Dispense Refill    cyclobenzaprine (FLEXERIL) 10 MG tablet Take 1 tablet by mouth 2 times daily as needed for Muscle spasms 10 tablet 0    ibuprofen (ADVIL;MOTRIN) 600 MG tablet Take 1 tablet by mouth 3 times daily as needed for Pain 180 tablet 0    acetaminophen (TYLENOL) 500 MG tablet Take 2 tablets by mouth 3 times daily as needed for Pain 90 tablet 0     No current facility-administered medications for this visit. Kobe Mccarthy, DO       This document may have been prepared at least partially through the use of voice recognition software. Although effort is taken to assure the accuracy ofthis document, it is possible that grammatical, syntax,  or spelling errors may occur.

## 2023-03-20 ENCOUNTER — HOSPITAL ENCOUNTER (EMERGENCY)
Age: 48
Discharge: HOME | End: 2023-03-20
Payer: MEDICAID

## 2023-03-20 VITALS
OXYGEN SATURATION: 99 % | DIASTOLIC BLOOD PRESSURE: 83 MMHG | TEMPERATURE: 97.3 F | RESPIRATION RATE: 16 BRPM | HEART RATE: 80 BPM | SYSTOLIC BLOOD PRESSURE: 127 MMHG

## 2023-03-20 DIAGNOSIS — R52 BODY ACHES: ICD-10-CM

## 2023-03-20 DIAGNOSIS — J06.9 VIRAL URI WITH COUGH: Primary | ICD-10-CM

## 2023-03-20 LAB
INFLUENZA A BY PCR: NOT DETECTED
INFLUENZA B BY PCR: NOT DETECTED
SARS-COV-2 RDRP RESP QL NAA+PROBE: NOT DETECTED

## 2023-03-20 PROCEDURE — 99283 EMERGENCY DEPT VISIT LOW MDM: CPT

## 2023-03-20 PROCEDURE — 87502 INFLUENZA DNA AMP PROBE: CPT

## 2023-03-20 PROCEDURE — 87635 SARS-COV-2 COVID-19 AMP PRB: CPT

## 2023-03-20 PROCEDURE — 6370000000 HC RX 637 (ALT 250 FOR IP): Performed by: PHYSICIAN ASSISTANT

## 2023-03-20 RX ORDER — NAPROXEN 500 MG/1
500 TABLET ORAL 2 TIMES DAILY
Qty: 30 TABLET | Refills: 0 | Status: SHIPPED | OUTPATIENT
Start: 2023-03-20

## 2023-03-20 RX ORDER — BROMPHENIRAMINE MALEATE, PSEUDOEPHEDRINE HYDROCHLORIDE, AND DEXTROMETHORPHAN HYDROBROMIDE 2; 30; 10 MG/5ML; MG/5ML; MG/5ML
5 SYRUP ORAL 4 TIMES DAILY PRN
Qty: 250 ML | Refills: 0 | Status: SHIPPED | OUTPATIENT
Start: 2023-03-20

## 2023-03-20 RX ORDER — ACETAMINOPHEN 500 MG
1000 TABLET ORAL ONCE
Status: COMPLETED | OUTPATIENT
Start: 2023-03-20 | End: 2023-03-20

## 2023-03-20 RX ORDER — IBUPROFEN 800 MG/1
800 TABLET ORAL ONCE
Status: COMPLETED | OUTPATIENT
Start: 2023-03-20 | End: 2023-03-20

## 2023-03-20 RX ORDER — FLUTICASONE PROPIONATE 50 MCG
2 SPRAY, SUSPENSION (ML) NASAL DAILY
Qty: 16 G | Refills: 0 | Status: SHIPPED | OUTPATIENT
Start: 2023-03-20

## 2023-03-20 RX ADMIN — IBUPROFEN 800 MG: 800 TABLET, FILM COATED ORAL at 11:16

## 2023-03-20 RX ADMIN — ACETAMINOPHEN 1000 MG: 500 TABLET ORAL at 11:16

## 2023-03-20 NOTE — ED PROVIDER NOTES
Independent [facility] Visit. Department of Emergency Medicine   ED  Provider Note  Admit Date/RoomTime: 3/20/2023 10:26 AM  ED Room: [facility]    Chief Complaint:   Generalized Body Aches (Woke this AM with body wide pain, minimal cough, denies fever)    History of Present Illness      Chente Tamayo is a 49 y.o. old male who presents to the ED for generalized body aches and cough that began when he woke up this morning. He denies any fever. Patient has no chest pain, SOB, pain with breathing, abdominal pain, nausea, vomiting, diarrhea, headache, dizziness, neck pain, or rash. He denies any known sick contacts. Patient is alert and oriented x3 and in no apparent distress at this exam. He is nontoxic appearing. Patient is 99% on room air with unlabored breathing. PCP: DO MARIA R Rubin   Pertinent positives and negatives are stated within HPI, all other systems reviewed and are negative. Past Medical History:  has a past medical history of Bimalleolar fracture of left ankle. Past Surgical History:  has a past surgical history that includes Ankle fracture surgery (Left, 2011) and fracture surgery (9-18-14). Social History:  reports that he has been smoking cigars and cigarettes. He has a 2.50 pack-year smoking history. He has never used smokeless tobacco. He reports that he does not drink alcohol and does not use drugs. Family History: family history is not on file. The patients home medications have been reviewed. Allergies: Patient has no known allergies. Allergies have been reviewed with patient. Physical Exam   VS:  /83   Pulse 80   Temp 97.3 °F (36.3 °C)   Resp 16   SpO2 99%      Oxygen Saturation Interpretation: Normal.    Constitutional:  Alert and oriented x3, development consistent with age.  NAD  Eyes: EOMI, non-injected conjunctiva   Ears:  External Ears: Bilateral normal.              TM's & External Canals:  normal TM's and external ear canals both

## 2023-04-04 ENCOUNTER — OFFICE VISIT (OUTPATIENT)
Dept: PRIMARY CARE CLINIC | Age: 48
End: 2023-04-04
Payer: MEDICAID

## 2023-04-04 ENCOUNTER — HOSPITAL ENCOUNTER (OUTPATIENT)
Age: 48
Discharge: HOME | End: 2023-04-04
Payer: MEDICAID

## 2023-04-04 ENCOUNTER — HOSPITAL ENCOUNTER (OUTPATIENT)
Age: 48
Discharge: HOME | End: 2023-04-06
Payer: MEDICAID

## 2023-04-04 ENCOUNTER — HOSPITAL ENCOUNTER (OUTPATIENT)
Dept: GENERAL RADIOLOGY | Age: 48
Discharge: HOME | End: 2023-04-06
Payer: MEDICAID

## 2023-04-04 VITALS
RESPIRATION RATE: 18 BRPM | TEMPERATURE: 96.9 F | OXYGEN SATURATION: 98 % | DIASTOLIC BLOOD PRESSURE: 68 MMHG | SYSTOLIC BLOOD PRESSURE: 126 MMHG | HEART RATE: 69 BPM | WEIGHT: 146 LBS | HEIGHT: 74 IN | BODY MASS INDEX: 18.74 KG/M2

## 2023-04-04 DIAGNOSIS — M79.10 MYALGIA: ICD-10-CM

## 2023-04-04 DIAGNOSIS — R06.09 DYSPNEA ON EXERTION: Primary | ICD-10-CM

## 2023-04-04 DIAGNOSIS — R63.4 WEIGHT LOSS: ICD-10-CM

## 2023-04-04 DIAGNOSIS — R07.9 CHEST PAIN, UNSPECIFIED TYPE: ICD-10-CM

## 2023-04-04 DIAGNOSIS — M25.511 RIGHT SHOULDER PAIN, UNSPECIFIED CHRONICITY: ICD-10-CM

## 2023-04-04 DIAGNOSIS — R93.89 ABNORMAL CXR: ICD-10-CM

## 2023-04-04 DIAGNOSIS — R06.09 DYSPNEA ON EXERTION: ICD-10-CM

## 2023-04-04 LAB
ALBUMIN SERPL-MCNC: 4 G/DL (ref 3.5–5.2)
ALP SERPL-CCNC: 107 U/L (ref 40–129)
ALT SERPL-CCNC: 34 U/L (ref 0–40)
ANION GAP SERPL CALCULATED.3IONS-SCNC: 14 MMOL/L (ref 7–16)
AST SERPL-CCNC: 26 U/L (ref 0–39)
BASOPHILS # BLD: 0.04 E9/L (ref 0–0.2)
BASOPHILS NFR BLD: 0.5 % (ref 0–2)
BILIRUB SERPL-MCNC: 0.3 MG/DL (ref 0–1.2)
BUN SERPL-MCNC: 13 MG/DL (ref 6–20)
CALCIUM SERPL-MCNC: 9.3 MG/DL (ref 8.6–10.2)
CHLORIDE SERPL-SCNC: 104 MMOL/L (ref 98–107)
CO2 SERPL-SCNC: 22 MMOL/L (ref 22–29)
CREAT SERPL-MCNC: 1 MG/DL (ref 0.7–1.2)
CRP SERPL HS-MCNC: <0.3 MG/DL (ref 0–0.4)
EOSINOPHIL # BLD: 0.25 E9/L (ref 0.05–0.5)
EOSINOPHIL NFR BLD: 3.1 % (ref 0–6)
ERYTHROCYTE [DISTWIDTH] IN BLOOD BY AUTOMATED COUNT: 12.5 FL (ref 11.5–15)
ERYTHROCYTE [SEDIMENTATION RATE] IN BLOOD BY WESTERGREN METHOD: 14 MM/HR (ref 0–15)
GLUCOSE SERPL-MCNC: 87 MG/DL (ref 74–99)
HCT VFR BLD AUTO: 47.9 % (ref 37–54)
HGB BLD-MCNC: 15.6 G/DL (ref 12.5–16.5)
IMM GRANULOCYTES # BLD: 0.03 E9/L
IMM GRANULOCYTES NFR BLD: 0.4 % (ref 0–5)
LYMPHOCYTES # BLD: 1.95 E9/L (ref 1.5–4)
LYMPHOCYTES NFR BLD: 23.9 % (ref 20–42)
MCH RBC QN AUTO: 28.5 PG (ref 26–35)
MCHC RBC AUTO-ENTMCNC: 32.6 % (ref 32–34.5)
MCV RBC AUTO: 87.6 FL (ref 80–99.9)
MONOCYTES # BLD: 0.48 E9/L (ref 0.1–0.95)
MONOCYTES NFR BLD: 5.9 % (ref 2–12)
NEUTROPHILS # BLD: 5.41 E9/L (ref 1.8–7.3)
NEUTS SEG NFR BLD: 66.2 % (ref 43–80)
PLATELET # BLD AUTO: 283 E9/L (ref 130–450)
PMV BLD AUTO: 9.9 FL (ref 7–12)
POTASSIUM SERPL-SCNC: 4 MMOL/L (ref 3.5–5)
PREALB SERPL-MCNC: 25 MG/DL (ref 20–40)
PROT SERPL-MCNC: 7.5 G/DL (ref 6.4–8.3)
RBC # BLD AUTO: 5.47 E12/L (ref 3.8–5.8)
SODIUM SERPL-SCNC: 140 MMOL/L (ref 132–146)
WBC # BLD: 8.2 E9/L (ref 4.5–11.5)

## 2023-04-04 PROCEDURE — 99214 OFFICE O/P EST MOD 30 MIN: CPT | Performed by: INTERNAL MEDICINE

## 2023-04-04 PROCEDURE — 80053 COMPREHEN METABOLIC PANEL: CPT

## 2023-04-04 PROCEDURE — 4004F PT TOBACCO SCREEN RCVD TLK: CPT | Performed by: INTERNAL MEDICINE

## 2023-04-04 PROCEDURE — 93000 ELECTROCARDIOGRAM COMPLETE: CPT | Performed by: INTERNAL MEDICINE

## 2023-04-04 PROCEDURE — 84134 ASSAY OF PREALBUMIN: CPT

## 2023-04-04 PROCEDURE — G8420 CALC BMI NORM PARAMETERS: HCPCS | Performed by: INTERNAL MEDICINE

## 2023-04-04 PROCEDURE — 85025 COMPLETE CBC W/AUTO DIFF WBC: CPT

## 2023-04-04 PROCEDURE — 85651 RBC SED RATE NONAUTOMATED: CPT

## 2023-04-04 PROCEDURE — 71046 X-RAY EXAM CHEST 2 VIEWS: CPT

## 2023-04-04 PROCEDURE — 36415 COLL VENOUS BLD VENIPUNCTURE: CPT

## 2023-04-04 PROCEDURE — G8427 DOCREV CUR MEDS BY ELIG CLIN: HCPCS | Performed by: INTERNAL MEDICINE

## 2023-04-04 PROCEDURE — 86140 C-REACTIVE PROTEIN: CPT

## 2023-04-04 SDOH — ECONOMIC STABILITY: HOUSING INSECURITY
IN THE LAST 12 MONTHS, WAS THERE A TIME WHEN YOU DID NOT HAVE A STEADY PLACE TO SLEEP OR SLEPT IN A SHELTER (INCLUDING NOW)?: NO

## 2023-04-04 SDOH — ECONOMIC STABILITY: FOOD INSECURITY: WITHIN THE PAST 12 MONTHS, YOU WORRIED THAT YOUR FOOD WOULD RUN OUT BEFORE YOU GOT MONEY TO BUY MORE.: OFTEN TRUE

## 2023-04-04 SDOH — ECONOMIC STABILITY: INCOME INSECURITY: HOW HARD IS IT FOR YOU TO PAY FOR THE VERY BASICS LIKE FOOD, HOUSING, MEDICAL CARE, AND HEATING?: SOMEWHAT HARD

## 2023-04-04 SDOH — ECONOMIC STABILITY: FOOD INSECURITY: WITHIN THE PAST 12 MONTHS, THE FOOD YOU BOUGHT JUST DIDN'T LAST AND YOU DIDN'T HAVE MONEY TO GET MORE.: OFTEN TRUE

## 2023-04-10 ENCOUNTER — TELEPHONE (OUTPATIENT)
Dept: ORTHOPEDIC SURGERY | Age: 48
End: 2023-04-10

## 2023-04-17 ENCOUNTER — OFFICE VISIT (OUTPATIENT)
Dept: ORTHOPEDIC SURGERY | Age: 48
End: 2023-04-17
Payer: MEDICAID

## 2023-04-17 VITALS — WEIGHT: 140 LBS | BODY MASS INDEX: 17.97 KG/M2 | HEIGHT: 74 IN

## 2023-04-17 DIAGNOSIS — Z01.818 PRE-OP EXAM: Primary | ICD-10-CM

## 2023-04-17 PROCEDURE — G8419 CALC BMI OUT NRM PARAM NOF/U: HCPCS | Performed by: ORTHOPAEDIC SURGERY

## 2023-04-17 PROCEDURE — 99214 OFFICE O/P EST MOD 30 MIN: CPT | Performed by: ORTHOPAEDIC SURGERY

## 2023-04-17 PROCEDURE — G8427 DOCREV CUR MEDS BY ELIG CLIN: HCPCS | Performed by: ORTHOPAEDIC SURGERY

## 2023-04-17 PROCEDURE — 4004F PT TOBACCO SCREEN RCVD TLK: CPT | Performed by: ORTHOPAEDIC SURGERY

## 2023-04-17 NOTE — PROGRESS NOTES
PM    CALCIUM 9.3 04/04/2023 12:39 PM    GFRAA >60 09/13/2022 12:49 PM    LABGLOM >60 04/04/2023 12:39 PM    GLUCOSE 87 04/04/2023 12:39 PM       Radiology Review: MRI reviewed showing full-thickness tear of the supraspinatus with retraction, subluxation of the long head biceps tendon    ASSESSMENT AND PLAN:    1. Patient is a 49 y.o. male with above specified procedure planned right shoulder arthroscopy rotator cuff repair versus debridement, possible biceps tenotomy versus tenodesis with anesthesia    2. Procedure options, risks and benefits reviewed with patient. Patient expresses understanding and has signed consent form to proceed. 3.  Patient and family were provided with pre-op and post-op instructions, prescription medications, and any other supplied needed post op (slings, braces, etc.)      Controlled substances monitoring: possible medication side effects, risk of tolerance and/or dependence, and alternative treatments discussed, no signs of potential drug abuse or diversion identified, and OARRS report reviewed today- activity consistent with treatment plan. DANICA Monsalve  Orthopedic Surgery   04/17/23  4:44 PM      Staff Addendum    I have seen and evaluated the patient and agree with the assessment and plan as documented by Lucila Buckner CNP. I have performed the key components of the history and physical examination and concur with the findings and plan, and have made changes where appropriate/necessary.           Joe Caballero MD  [address]

## 2023-04-23 DIAGNOSIS — Z98.890 STATUS POST ARTHROSCOPY OF RIGHT SHOULDER: Primary | ICD-10-CM

## 2023-04-23 RX ORDER — HYDROCODONE BITARTRATE AND ACETAMINOPHEN 5; 325 MG/1; MG/1
1 TABLET ORAL EVERY 6 HOURS PRN
Qty: 28 TABLET | Refills: 0 | Status: SHIPPED | OUTPATIENT
Start: 2023-04-23 | End: 2023-04-30

## 2023-04-23 RX ORDER — KETOROLAC TROMETHAMINE 10 MG/1
10 TABLET, FILM COATED ORAL EVERY 6 HOURS
Qty: 8 TABLET | Refills: 0 | Status: SHIPPED | OUTPATIENT
Start: 2023-04-23 | End: 2023-04-25

## 2023-04-25 ENCOUNTER — HOSPITAL ENCOUNTER (EMERGENCY)
Age: 48
Discharge: HOME | End: 2023-04-25
Payer: MEDICAID

## 2023-04-25 VITALS
RESPIRATION RATE: 19 BRPM | HEART RATE: 70 BPM | DIASTOLIC BLOOD PRESSURE: 83 MMHG | TEMPERATURE: 98.7 F | BODY MASS INDEX: 18.75 KG/M2 | WEIGHT: 146 LBS | OXYGEN SATURATION: 100 % | SYSTOLIC BLOOD PRESSURE: 167 MMHG

## 2023-04-25 DIAGNOSIS — G89.18 ACUTE POST-OPERATIVE PAIN: Primary | ICD-10-CM

## 2023-04-25 PROCEDURE — 6370000000 HC RX 637 (ALT 250 FOR IP): Performed by: NURSE PRACTITIONER

## 2023-04-25 PROCEDURE — 6360000002 HC RX W HCPCS: Performed by: NURSE PRACTITIONER

## 2023-04-25 PROCEDURE — 99284 EMERGENCY DEPT VISIT MOD MDM: CPT

## 2023-04-25 PROCEDURE — 96372 THER/PROPH/DIAG INJ SC/IM: CPT

## 2023-04-25 RX ORDER — METHOCARBAMOL 500 MG/1
500 TABLET, FILM COATED ORAL ONCE
Status: COMPLETED | OUTPATIENT
Start: 2023-04-25 | End: 2023-04-25

## 2023-04-25 RX ORDER — KETOROLAC TROMETHAMINE 30 MG/ML
30 INJECTION, SOLUTION INTRAMUSCULAR; INTRAVENOUS ONCE
Status: COMPLETED | OUTPATIENT
Start: 2023-04-25 | End: 2023-04-25

## 2023-04-25 RX ORDER — MORPHINE SULFATE 4 MG/ML
4 INJECTION, SOLUTION INTRAMUSCULAR; INTRAVENOUS ONCE
Status: COMPLETED | OUTPATIENT
Start: 2023-04-25 | End: 2023-04-25

## 2023-04-25 RX ADMIN — MORPHINE SULFATE 4 MG: 4 INJECTION, SOLUTION INTRAMUSCULAR; INTRAVENOUS at 23:28

## 2023-04-25 RX ADMIN — METHOCARBAMOL 500 MG: 500 TABLET ORAL at 23:28

## 2023-04-25 RX ADMIN — KETOROLAC TROMETHAMINE 30 MG: 30 INJECTION, SOLUTION INTRAMUSCULAR at 23:27

## 2023-04-25 ASSESSMENT — PAIN SCALES - GENERAL: PAINLEVEL_OUTOF10: 10

## 2023-04-25 ASSESSMENT — PAIN - FUNCTIONAL ASSESSMENT: PAIN_FUNCTIONAL_ASSESSMENT: 0-10

## 2023-04-26 ENCOUNTER — OFFICE VISIT (OUTPATIENT)
Dept: ORTHOPEDIC SURGERY | Age: 48
End: 2023-04-26

## 2023-04-26 VITALS — WEIGHT: 146 LBS | HEIGHT: 74 IN | BODY MASS INDEX: 18.74 KG/M2

## 2023-04-26 DIAGNOSIS — M75.120 COMPLETE TEAR OF TENDON OF ROTATOR CUFF: ICD-10-CM

## 2023-04-26 DIAGNOSIS — Z98.890 S/P RIGHT ROTATOR CUFF REPAIR: Primary | ICD-10-CM

## 2023-04-26 RX ORDER — IBUPROFEN 800 MG/1
800 TABLET ORAL EVERY 8 HOURS PRN
Qty: 21 TABLET | Refills: 0 | Status: SHIPPED | OUTPATIENT
Start: 2023-04-26 | End: 2023-05-03

## 2023-04-26 RX ORDER — METHOCARBAMOL 500 MG/1
500 TABLET, FILM COATED ORAL 4 TIMES DAILY
Qty: 40 TABLET | Refills: 0 | Status: SHIPPED | OUTPATIENT
Start: 2023-04-26 | End: 2023-05-06

## 2023-04-26 NOTE — PROGRESS NOTES
[facility]   ORTHOPAEDIC SURGERY AND SPORTS MEDICINE  DATE OF VISIT: 04/26/23  Follow Up Post Operative Visit     CHIEF COMPLAINT:   Chief Complaint   Patient presents with    Follow Up After Procedure     4/25/2023 - RIGHT SHOULDER ARTHROSCOPY, ROTATOR CUFF REPAIR VERSUS DEBRIDEMENT, POSSIBLE  BICEPS TENOTOMY VERSUS TENODESIS      Post-Op Check     Rt shld s/p 1 day    Wound Check     Rt shld     Dressing Change     Rt shld     Pain     Rt shld , he went to ED last night due to post op pain        Surgery: Right shoulder arthroscopy, rotator cuff repair, biceps tenodesis  Date: 4/25/2023    Subjective:    Rafiq Dupont is here for follow up visit s/p above procedure. He is doing well. Patient does report presenting to the emergency department this morning for postoperative pain. He had not contacted the office. Controlled Substances Monitoring:        Physical Exam:    Height: 6' 2\" (1.88 m), Weight: 146 lb (66.2 kg) (per pt), BP: (!) 167/83, Pain 0-10: Pain Level: 10; General: Alert and oriented x3, no acute distress  Cardiovascular/pulmonary: No labored breathing, peripheral perfusion intact  Musculoskeletal:    Right shoulder exam incision sites closed edges well approximated no drainage present. Stable postoperative swelling. Neurovascular sensation grossly intact. Imaging: X-rays including 2 views of the right shoulder showing stable postoperative changes    Assessment and Plan: Status post right shoulder arthroscopy, rotator cuff repair, biceps tenodesis    Patient is postop day 1 from procedure listed above. He is doing well. Intraoperative pictures postoperative imaging were reviewed with him today. He will remain in sling as directed. He can remove sling for basic hygiene purposes and for passive range of motion exercises shown to him today. He will  remain nonweightbearing to the operative extremity.   Patient will follow-up in 6 weeks will discontinue shoulder immobilizer

## 2023-04-26 NOTE — PROGRESS NOTES
Surgical dressings were removed s/p RIGHT SHOULDER ARTHROSCOPY, ROTATOR CUFF REPAIR VERSUS DEBRIDEMENT, POSSIBLE  BICEPS TENOTOMY VERSUS TENODESIS on 4/25/2023. Incision was cleaned with alcohol prep pads. Minimal swelling and bleeding in area. Steri stripes and Tegaderm placed over the area to cover surgical sites x 1 week.     F

## 2023-04-26 NOTE — ED PROVIDER NOTES
independent  HPI:  4/25/23, Time: 11:14 PM EDT         Kali Odell is a 49 y.o. male presenting to the ED for worsening pain after an outpatient rotator cuff repair. Patient presents emergency department states that at 730 this morning at [facility] by Dr. Regina Sifuentes patient had a right shoulder rotator cuff repair after having a right shoulder full-thickness rotator cuff tear. Patient reports that he was discharged home around 12 noon and reports that he was prescribed Norco 5-325 mg tablets, states that throughout the afternoon and evening hours he did take a total of 2 tablets. He denies taking Motrin or any other medication to assist with further symptom relief he states that he is not getting full relief from the pain medicine. He admits that he did not call orthopedic surgeon. He denies any injury, no unusual paresthesia, numbness, tingling as well as no strikethrough noted to the dressing. Patient otherwise reports being in normal state health, denies chest pain, shortness of breath or any abdominal pain as well as no noted fevers. Patient reports being compliant and has been in shoulder immobilizer. Review of Systems:   A complete review of systems was performed and pertinent positives and negatives are stated within HPI, all other systems reviewed and are negative.          --------------------------------------------- PAST HISTORY ---------------------------------------------  Past Medical History:  has a past medical history of Bimalleolar fracture of left ankle. Past Surgical History:  has a past surgical history that includes Ankle fracture surgery (Left, 2011) and fracture surgery (9-18-14). Social History:  reports that he has been smoking cigars and cigarettes. He has a 2.50 pack-year smoking history. He has never used smokeless tobacco. He reports that he does not drink alcohol and does not use drugs. Family History: family history is not on file.      The

## 2023-04-27 ENCOUNTER — APPOINTMENT (OUTPATIENT)
Dept: CT IMAGING | Age: 48
End: 2023-04-27
Payer: MEDICAID

## 2023-04-27 ENCOUNTER — HOSPITAL ENCOUNTER (EMERGENCY)
Age: 48
Discharge: HOME | End: 2023-04-28
Attending: EMERGENCY MEDICINE
Payer: MEDICAID

## 2023-04-27 DIAGNOSIS — R55 NEAR SYNCOPE: Primary | ICD-10-CM

## 2023-04-27 DIAGNOSIS — R10.84 GENERALIZED ABDOMINAL PAIN: ICD-10-CM

## 2023-04-27 DIAGNOSIS — E16.2 HYPOGLYCEMIA: ICD-10-CM

## 2023-04-27 LAB
ALBUMIN SERPL-MCNC: 4 G/DL (ref 3.5–5.2)
ALP SERPL-CCNC: 99 U/L (ref 40–129)
ALT SERPL-CCNC: 23 U/L (ref 0–40)
ANION GAP SERPL CALCULATED.3IONS-SCNC: 14 MMOL/L (ref 7–16)
AST SERPL-CCNC: 33 U/L (ref 0–39)
B.E.: -1.8 MMOL/L (ref -3–3)
BASOPHILS # BLD: 0.04 E9/L (ref 0–0.2)
BASOPHILS NFR BLD: 0.2 % (ref 0–2)
BILIRUB SERPL-MCNC: 0.9 MG/DL (ref 0–1.2)
BUN SERPL-MCNC: 11 MG/DL (ref 6–20)
CALCIUM SERPL-MCNC: 8.9 MG/DL (ref 8.6–10.2)
CHLORIDE SERPL-SCNC: 106 MMOL/L (ref 98–107)
CO2 SERPL-SCNC: 21 MMOL/L (ref 22–29)
CREAT SERPL-MCNC: 0.9 MG/DL (ref 0.7–1.2)
DELIVERY SYSTEMS: ABNORMAL
DEVICE: ABNORMAL
EOSINOPHIL # BLD: 0.06 E9/L (ref 0.05–0.5)
EOSINOPHIL NFR BLD: 0.4 % (ref 0–6)
ERYTHROCYTE [DISTWIDTH] IN BLOOD BY AUTOMATED COUNT: 12.5 FL (ref 11.5–15)
GLUCOSE SERPL-MCNC: 68 MG/DL (ref 74–99)
HCO3: 18.2 MMOL/L (ref 22–26)
HCT VFR BLD AUTO: 44.6 % (ref 37–54)
HEMATOCRIT: 39 % (ref 37–54)
HEMOGLOBIN: 13.2 G/DL (ref 12.5–16.5)
HGB BLD-MCNC: 14.2 G/DL (ref 12.5–16.5)
IMM GRANULOCYTES # BLD: 0.07 E9/L
IMM GRANULOCYTES NFR BLD: 0.4 % (ref 0–5)
LACTATE BLDV-SCNC: 1.2 MMOL/L (ref 0.5–2.2)
LIPASE: 17 U/L (ref 13–60)
LYMPHOCYTES # BLD: 1.04 E9/L (ref 1.5–4)
LYMPHOCYTES NFR BLD: 6.4 % (ref 20–42)
MCH RBC QN AUTO: 28.6 PG (ref 26–35)
MCHC RBC AUTO-ENTMCNC: 31.8 % (ref 32–34.5)
MCV RBC AUTO: 89.7 FL (ref 80–99.9)
MONOCYTES # BLD: 1.06 E9/L (ref 0.1–0.95)
MONOCYTES NFR BLD: 6.5 % (ref 2–12)
NEUTROPHILS # BLD: 13.95 E9/L (ref 1.8–7.3)
NEUTS SEG NFR BLD: 86.1 % (ref 43–80)
O2 SATURATION: 99.2 % (ref 92–98.5)
OPERATOR ID: 914
PCO2 37: 20.2 MMHG (ref 35–45)
PH 37: 7.56 (ref 7.35–7.45)
PLATELET # BLD AUTO: 237 E9/L (ref 130–450)
PMV BLD AUTO: 9.9 FL (ref 7–12)
PO2 37: 118.8 MMHG (ref 80–100)
POC SOURCE: ABNORMAL
POTASSIUM SERPL-SCNC: 3.7 MMOL/L (ref 3.5–5)
PROT SERPL-MCNC: 7.4 G/DL (ref 6.4–8.3)
RBC # BLD AUTO: 4.97 E12/L (ref 3.8–5.8)
REASON FOR REJECTION: NORMAL
REJECTED TEST: NORMAL
SODIUM SERPL-SCNC: 141 MMOL/L (ref 132–146)
TROPONIN, HIGH SENSITIVITY: 6 NG/L (ref 0–11)
WBC # BLD: 16.2 E9/L (ref 4.5–11.5)

## 2023-04-27 PROCEDURE — 70450 CT HEAD/BRAIN W/O DYE: CPT

## 2023-04-27 PROCEDURE — 83605 ASSAY OF LACTIC ACID: CPT

## 2023-04-27 PROCEDURE — 82803 BLOOD GASES ANY COMBINATION: CPT

## 2023-04-27 PROCEDURE — 99285 EMERGENCY DEPT VISIT HI MDM: CPT

## 2023-04-27 PROCEDURE — 83690 ASSAY OF LIPASE: CPT

## 2023-04-27 PROCEDURE — 6360000004 HC RX CONTRAST MEDICATION: Performed by: RADIOLOGY

## 2023-04-27 PROCEDURE — 71275 CT ANGIOGRAPHY CHEST: CPT

## 2023-04-27 PROCEDURE — 93005 ELECTROCARDIOGRAM TRACING: CPT | Performed by: EMERGENCY MEDICINE

## 2023-04-27 PROCEDURE — 85025 COMPLETE CBC W/AUTO DIFF WBC: CPT

## 2023-04-27 PROCEDURE — 80053 COMPREHEN METABOLIC PANEL: CPT

## 2023-04-27 PROCEDURE — 84484 ASSAY OF TROPONIN QUANT: CPT

## 2023-04-27 PROCEDURE — 2580000003 HC RX 258: Performed by: EMERGENCY MEDICINE

## 2023-04-27 PROCEDURE — 74174 CTA ABD&PLVS W/CONTRAST: CPT

## 2023-04-27 RX ORDER — 0.9 % SODIUM CHLORIDE 0.9 %
1000 INTRAVENOUS SOLUTION INTRAVENOUS ONCE
Status: COMPLETED | OUTPATIENT
Start: 2023-04-27 | End: 2023-04-27

## 2023-04-27 RX ADMIN — SODIUM CHLORIDE 1000 ML: 9 INJECTION, SOLUTION INTRAVENOUS at 19:29

## 2023-04-27 RX ADMIN — IOPAMIDOL 100 ML: 755 INJECTION, SOLUTION INTRAVENOUS at 23:11

## 2023-04-27 ASSESSMENT — ENCOUNTER SYMPTOMS
NAUSEA: 0
ABDOMINAL PAIN: 1
SHORTNESS OF BREATH: 0
EYE REDNESS: 0
VOMITING: 0

## 2023-04-27 NOTE — ED NOTES
Pt refusing ABG at this time, RN attempted once, pt screamed and yelled \"don't stick me again\" Dr. Romayne Medin notified      Phillip Andersonr, LIOR  04/27/23 1925

## 2023-04-28 ENCOUNTER — OFFICE VISIT (OUTPATIENT)
Dept: PRIMARY CARE CLINIC | Age: 48
End: 2023-04-28
Payer: MEDICAID

## 2023-04-28 VITALS
WEIGHT: 141 LBS | TEMPERATURE: 97 F | HEIGHT: 74 IN | RESPIRATION RATE: 19 BRPM | SYSTOLIC BLOOD PRESSURE: 122 MMHG | OXYGEN SATURATION: 98 % | HEART RATE: 87 BPM | DIASTOLIC BLOOD PRESSURE: 68 MMHG | BODY MASS INDEX: 18.1 KG/M2

## 2023-04-28 VITALS
SYSTOLIC BLOOD PRESSURE: 152 MMHG | HEART RATE: 77 BPM | OXYGEN SATURATION: 98 % | DIASTOLIC BLOOD PRESSURE: 88 MMHG | TEMPERATURE: 98.9 F | RESPIRATION RATE: 19 BRPM

## 2023-04-28 DIAGNOSIS — R41.82 ALTERED MENTAL STATUS, UNSPECIFIED ALTERED MENTAL STATUS TYPE: Primary | ICD-10-CM

## 2023-04-28 DIAGNOSIS — R55 VASOVAGAL EPISODE: ICD-10-CM

## 2023-04-28 DIAGNOSIS — Z96.611 STATUS POST REVERSE ARTHROPLASTY OF SHOULDER, RIGHT: ICD-10-CM

## 2023-04-28 PROBLEM — R56.9 CONVULSIONS, UNSPECIFIED CONVULSION TYPE (HCC): Status: RESOLVED | Noted: 2023-01-11 | Resolved: 2023-04-28

## 2023-04-28 LAB
CHP ED QC CHECK: NORMAL
EKG ATRIAL RATE: 84 BPM
EKG P AXIS: 78 DEGREES
EKG P-R INTERVAL: 106 MS
EKG Q-T INTERVAL: 392 MS
EKG QRS DURATION: 84 MS
EKG QTC CALCULATION (BAZETT): 463 MS
EKG R AXIS: 72 DEGREES
EKG T AXIS: 50 DEGREES
EKG VENTRICULAR RATE: 84 BPM
GLUCOSE BLD-MCNC: 96 MG/DL
METER GLUCOSE: 96 MG/DL (ref 74–99)
TROPONIN, HIGH SENSITIVITY: 6 NG/L (ref 0–11)

## 2023-04-28 PROCEDURE — 93010 ELECTROCARDIOGRAM REPORT: CPT | Performed by: INTERNAL MEDICINE

## 2023-04-28 PROCEDURE — 99213 OFFICE O/P EST LOW 20 MIN: CPT | Performed by: STUDENT IN AN ORGANIZED HEALTH CARE EDUCATION/TRAINING PROGRAM

## 2023-04-28 PROCEDURE — 82962 GLUCOSE BLOOD TEST: CPT

## 2023-04-28 PROCEDURE — G8427 DOCREV CUR MEDS BY ELIG CLIN: HCPCS | Performed by: STUDENT IN AN ORGANIZED HEALTH CARE EDUCATION/TRAINING PROGRAM

## 2023-04-28 PROCEDURE — 84484 ASSAY OF TROPONIN QUANT: CPT

## 2023-04-28 PROCEDURE — G8419 CALC BMI OUT NRM PARAM NOF/U: HCPCS | Performed by: STUDENT IN AN ORGANIZED HEALTH CARE EDUCATION/TRAINING PROGRAM

## 2023-04-28 PROCEDURE — 4004F PT TOBACCO SCREEN RCVD TLK: CPT | Performed by: STUDENT IN AN ORGANIZED HEALTH CARE EDUCATION/TRAINING PROGRAM

## 2023-04-28 NOTE — PROGRESS NOTES
[facility]  Department of Family Medicine      Patient:  Mili hCou 49 y.o. male     Date of Service: 23      Chief complaint:   Chief Complaint   Patient presents with    ED Follow-up         History ofPresent Illness   The patient is a 49 y.o. male  presented to the clinic with complaints as above. ED f/u  -for altered mental status/near syncope   -of note, had shoulder surgery this Tuesday, pain has been ok  -has been taking the norco once a day   -imaging in ed was non explanatory, blood glucose was 68, became more alert after giving him glucose   -currently, feeling a lot better  -denies any lightheadedness or dizziness  -denies any chest pain or SOB   -denies any abdominal pain   -states initially, he had abdominal pain, leaned over, then felt as if he was going to pass out   -moving bowels and urinating normally     Past Medical History:      Diagnosis Date    Bimalleolar fracture of left ankle 2014       PastSurgical History:        Procedure Laterality Date    ANKLE FRACTURE SURGERY Left     FRACTURE SURGERY  14    ORIF Left Medial Maleous       Allergies:    Patient has no known allergies.     Social History:   Social History     Socioeconomic History    Marital status: Single     Spouse name: Not on file    Number of children: Not on file    Years of education: Not on file    Highest education level: Not on file   Occupational History    Not on file   Tobacco Use    Smoking status: Some Days     Packs/day: 0.50     Years: 5.00     Pack years: 2.50     Types: Cigars, Cigarettes     Last attempt to quit: 2022     Years since quittin.7    Smokeless tobacco: Never   Vaping Use    Vaping Use: Never used   Substance and Sexual Activity    Alcohol use: No    Drug use: No    Sexual activity: Not on file   Other Topics Concern    Not on file   Social History Narrative    Not on file     Social Determinants of Health     Financial Resource Strain:

## 2023-04-28 NOTE — ED PROVIDER NOTES
Alert-The patient is alert, awake and responds to voice. The patient is oriented to time, place, and person. The triage nurse is able to obtain subjective information.
improvement with rest.  The patient was completely at his baseline for discharge. He is counseled on signs and symptoms for which to return. Pt will be d/c and will follow up with his PCP . He is educated on signs and symptoms that require emergent evaluation. Pt is advised to return to the ED if his symptoms change or worsen. If his pain persists, pt may need further evaluation. Pt is agreeable to plan and all questions have been answered at this time. PATIENT REFERRED TO:  Antione Gamez [phone]    Call in 1 day        DISCHARGE MEDICATIONS:  Discharge Medication List as of 4/28/2023 12:45 AM              EMERGENCY DEPARTMENT COURSE    Vitals:    Vitals:    04/27/23 2130 04/27/23 2145 04/27/23 2200 04/27/23 2345   BP: (!) 152/89 (!) 155/70 (!) 159/84 (!) 152/88   Pulse: 89 83 77    Resp: 16 17 19    Temp:       TempSrc:       SpO2: 99% 98% 99% 98%               I am the Primary Clinician of Record. FINAL IMPRESSION      1. Near syncope    2. Generalized abdominal pain    3. Hypoglycemia          DISPOSITION/PLAN      Decision To Discharge 04/28/2023 12:42:02 AM    Patient's disposition: Discharge to home  Patient's condition is stable.            (Please note that portions of this note were completed with a voice recognition program.  Efforts were made to edit the dictations but occasionally words are mis-transcribed.)    Hailee Muniz DO (electronically signed)           Hailee Muniz DO  04/28/23 1126

## 2023-05-12 ENCOUNTER — OFFICE VISIT (OUTPATIENT)
Dept: PRIMARY CARE CLINIC | Age: 48
End: 2023-05-12
Payer: MEDICAID

## 2023-05-12 VITALS
TEMPERATURE: 97.1 F | DIASTOLIC BLOOD PRESSURE: 70 MMHG | HEART RATE: 68 BPM | HEIGHT: 74 IN | SYSTOLIC BLOOD PRESSURE: 122 MMHG | WEIGHT: 142 LBS | BODY MASS INDEX: 18.22 KG/M2 | RESPIRATION RATE: 18 BRPM | OXYGEN SATURATION: 98 %

## 2023-05-12 DIAGNOSIS — Z96.611 STATUS POST REVERSE ARTHROPLASTY OF SHOULDER, RIGHT: ICD-10-CM

## 2023-05-12 DIAGNOSIS — F17.210 SMOKES CIGARETTES: Primary | ICD-10-CM

## 2023-05-12 DIAGNOSIS — R63.4 WEIGHT LOSS: ICD-10-CM

## 2023-05-12 PROCEDURE — G8427 DOCREV CUR MEDS BY ELIG CLIN: HCPCS | Performed by: STUDENT IN AN ORGANIZED HEALTH CARE EDUCATION/TRAINING PROGRAM

## 2023-05-12 PROCEDURE — G8419 CALC BMI OUT NRM PARAM NOF/U: HCPCS | Performed by: STUDENT IN AN ORGANIZED HEALTH CARE EDUCATION/TRAINING PROGRAM

## 2023-05-12 PROCEDURE — 1036F TOBACCO NON-USER: CPT | Performed by: STUDENT IN AN ORGANIZED HEALTH CARE EDUCATION/TRAINING PROGRAM

## 2023-05-12 PROCEDURE — 99213 OFFICE O/P EST LOW 20 MIN: CPT | Performed by: STUDENT IN AN ORGANIZED HEALTH CARE EDUCATION/TRAINING PROGRAM

## 2023-05-12 NOTE — PROGRESS NOTES
Running Out of Food in the Last Year: Often true    Ran Out of Food in the Last Year: Often true   Transportation Needs: Unknown    Lack of Transportation (Medical): Not on file    Lack of Transportation (Non-Medical): No   Physical Activity: Not on file   Stress: Not on file   Social Connections: Not on file   Intimate Partner Violence: Not on file   Housing Stability: Unknown    Unable to Pay for Housing in the Last Year: Not on file    Number of Places Lived in the Last Year: Not on file    Unstable Housing in the Last Year: No        Family History:   No family history on file. Review of Systems:   Review of Systems - as above     Physical Exam   Vitals: /70   Pulse 68   Temp 97.1 °F (36.2 °C) (Infrared)   Resp 18   Ht 6' 2\" (1.88 m)   Wt 142 lb (64.4 kg)   SpO2 98%   BMI 18.23 kg/m²   Physical Exam  Constitutional:       Appearance: He is well-developed. HENT:      Head: Normocephalic and atraumatic. Eyes:      General:         Right eye: No discharge. Left eye: No discharge. Conjunctiva/sclera: Conjunctivae normal.   Neck:      Trachea: No tracheal deviation. Cardiovascular:      Rate and Rhythm: Normal rate and regular rhythm. Heart sounds: Normal heart sounds. Pulmonary:      Effort: Pulmonary effort is normal. No respiratory distress. Breath sounds: Normal breath sounds. No wheezing. Abdominal:      General: Bowel sounds are normal. There is no distension. Palpations: Abdomen is soft. Tenderness: There is no abdominal tenderness. Musculoskeletal:      Cervical back: Normal range of motion and neck supple. Comments: Right shoulder/arm in sling   Skin:     General: Skin is warm and dry. Neurological:      Mental Status: He is alert. Psychiatric:         Behavior: Behavior normal.           Assessment and Plan       1. Smokes cigarettes  Resolved as stopped smoking 3 months ago, this was encouraged    2.  Weight loss  F/u   -Gained 1 pound

## 2023-07-03 ENCOUNTER — HOSPITAL ENCOUNTER (OUTPATIENT)
Dept: PHYSICAL THERAPY | Age: 48
Setting detail: THERAPIES SERIES
Discharge: HOME | End: 2023-07-03
Payer: MEDICAID

## 2023-07-03 PROCEDURE — 97161 PT EVAL LOW COMPLEX 20 MIN: CPT | Performed by: PHYSICAL THERAPIST

## 2023-07-03 ASSESSMENT — PAIN DESCRIPTION - ORIENTATION: ORIENTATION: RIGHT

## 2023-07-03 ASSESSMENT — PAIN DESCRIPTION - DESCRIPTORS: DESCRIPTORS: ACHING;BURNING

## 2023-07-03 ASSESSMENT — PAIN SCALES - GENERAL: PAINLEVEL_OUTOF10: 7

## 2023-07-03 ASSESSMENT — PAIN DESCRIPTION - PAIN TYPE: TYPE: SURGICAL PAIN

## 2023-07-03 ASSESSMENT — PAIN DESCRIPTION - LOCATION: LOCATION: ARM;NECK;SHOULDER

## 2023-07-03 NOTE — PROGRESS NOTES
[address]                Phone: [phone]   Fax: [phone]    Physical Therapy Daily Treatment Note  Date:  7/3/2023    Patient Name:  Tasia Frankel    :  1975  MRN: [id number]    Evaluating therapist:  LUDIN Gustafson               (7/3/23)  Restrictions/Precautions:    Diagnosis:  s/p RTC repair R shoulder               ()  Treatment Diagnosis:    Insurance/Certification information:  [employer]  Referring Physician:    Plan of care signed (Y/N):    Visit# / total visits:    Pain level: 7/10   Time In:  Time Out:    Subjective:      Exercises:  Exercise/Equipment Resistance/Repetitions Other comments   UBE               pulleys for flex/abd              table st:  flex                    abd                     ER     pendulums            shrugs     scap ret     supine wand flex                                                         Other Therapeutic Activities:      Home Exercise Program:  provided 7/3/23    Manual Treatments:      Modalities:  IFC/ICE to R shoulder PRN     Timed Code Treatment Minutes: Total Treatment Minutes:      Treatment/Activity Tolerance:  [] Patient tolerated treatment well [] Patient limited by fatique  [] Patient limited by pain  [] Patient limited by other medical complications  [] Other:     Prognosis: [] Good [] Fair  [] Poor    Patient Requires Follow-up: [] Yes  [] No    Plan:   [] Continue per plan of care [] Alter current plan (see comments)  [] Plan of care initiated [] Hold pending MD visit [] Discharge  Plan for Next Session:      See Weekly Progress Note: []  Yes  []  No  Next due:        Electronically signed by:   Tha Tyler PT

## 2023-07-06 ENCOUNTER — HOSPITAL ENCOUNTER (OUTPATIENT)
Dept: PHYSICAL THERAPY | Age: 48
Setting detail: THERAPIES SERIES
Discharge: HOME | End: 2023-07-06
Payer: MEDICAID

## 2023-07-06 PROCEDURE — G0283 ELEC STIM OTHER THAN WOUND: HCPCS | Performed by: PHYSICAL THERAPIST

## 2023-07-06 PROCEDURE — 97110 THERAPEUTIC EXERCISES: CPT | Performed by: PHYSICAL THERAPIST

## 2023-07-06 PROCEDURE — 97530 THERAPEUTIC ACTIVITIES: CPT | Performed by: PHYSICAL THERAPIST

## 2023-07-06 NOTE — PROGRESS NOTES
[address]                Phone: [phone]   Fax: [phone]    Physical Therapy Daily Treatment Note  Date:  2023    Patient Name:  Lencho Cast    :  1975  MRN: [id number]    Evaluating therapist:  LUDIN Espinoza               (7/3/23)  Restrictions/Precautions:    Diagnosis:  s/p RTC repair R shoulder               ()  Treatment Diagnosis:    Insurance/Certification information:  [employer]  Referring Physician:    Plan of care signed (Y/N):    Visit# / total visits:    Pain level: 7/10   Time In:  1356  Time Out:  1507    Subjective:      Exercises:  Exercise/Equipment Resistance/Repetitions Other comments   UBE    3 min F/B           pulleys for flex/abd   5 min ea           table st:  flex 3x20s                   abd 3x20s                    ER 3x20s    pendulums 20x2lb            shrugs 15x3s    scap ret 15x3s    supine wand flex  15x3s                                                       Other Therapeutic Activities:      Home Exercise Program:  provided 7/3/23    Manual Treatments:      Modalities:  IFC/ICE to R shoulder PRN     Timed Code Treatment Minutes: Total Treatment Minutes:      Treatment/Activity Tolerance:  [] Patient tolerated treatment well [] Patient limited by fatique  [] Patient limited by pain  [] Patient limited by other medical complications  [] Other:     Prognosis: [] Good [] Fair  [] Poor    Patient Requires Follow-up: [] Yes  [] No    Plan:   [] Continue per plan of care [] Alter current plan (see comments)  [] Plan of care initiated [] Hold pending MD visit [] Discharge  Plan for Next Session:      See Weekly Progress Note: []  Yes  []  No  Next due:        Electronically signed by:   Sushma Land PT

## 2023-07-10 ENCOUNTER — HOSPITAL ENCOUNTER (OUTPATIENT)
Dept: PHYSICAL THERAPY | Age: 48
Setting detail: THERAPIES SERIES
Discharge: HOME | End: 2023-07-10
Payer: MEDICAID

## 2023-07-10 PROCEDURE — G0283 ELEC STIM OTHER THAN WOUND: HCPCS | Performed by: PHYSICAL THERAPIST

## 2023-07-10 PROCEDURE — 97530 THERAPEUTIC ACTIVITIES: CPT | Performed by: PHYSICAL THERAPIST

## 2023-07-10 PROCEDURE — 97110 THERAPEUTIC EXERCISES: CPT | Performed by: PHYSICAL THERAPIST

## 2023-07-10 NOTE — PROGRESS NOTES
[address]                Phone: [phone]   Fax: [phone]    Physical Therapy Daily Treatment Note  Date:  7/10/2023    Patient Name:  Annamarie Rice    :  1975  MRN: [id number]    Evaluating therapist:  LUDIN Saba               (7/3/23)  Restrictions/Precautions:    Diagnosis:  s/p RTC repair R shoulder               ()  Treatment Diagnosis:    Insurance/Certification information:  [employer]  Referring Physician:    Plan of care signed (Y/N):    Visit# / total visits:  3/8  Pain level: 7/10   Time In:  1359  Time Out:  1507    Subjective:      Exercises:  Exercise/Equipment Resistance/Repetitions Other comments   UBE    3 min F/B           pulleys for flex/abd   5 min ea           table st:  flex 3x20s                   abd 3x20s                    ER 3x20s    pendulums 20x2lb            shrugs 15x3s    scap ret 15x3s    supine wand flex  15x3s                                                       Other Therapeutic Activities:      Home Exercise Program:  provided 7/3/23    Manual Treatments:      Modalities:  IFC/ICE to R shoulder PRN     Timed Code Treatment Minutes: Total Treatment Minutes:      Treatment/Activity Tolerance:  [] Patient tolerated treatment well [] Patient limited by fatique  [] Patient limited by pain  [] Patient limited by other medical complications  [] Other:     Prognosis: [] Good [] Fair  [] Poor    Patient Requires Follow-up: [] Yes  [] No    Plan:   [] Continue per plan of care [] Alter current plan (see comments)  [] Plan of care initiated [] Hold pending MD visit [] Discharge  Plan for Next Session:      See Weekly Progress Note: []  Yes  []  No  Next due:        Electronically signed by:   Charity So, PT

## 2023-07-12 ENCOUNTER — HOSPITAL ENCOUNTER (OUTPATIENT)
Dept: PHYSICAL THERAPY | Age: 48
Setting detail: THERAPIES SERIES
Discharge: HOME | End: 2023-07-12
Payer: MEDICAID

## 2023-07-12 PROCEDURE — G0283 ELEC STIM OTHER THAN WOUND: HCPCS | Performed by: PHYSICAL THERAPIST

## 2023-07-12 PROCEDURE — 97530 THERAPEUTIC ACTIVITIES: CPT | Performed by: PHYSICAL THERAPIST

## 2023-07-12 PROCEDURE — 97110 THERAPEUTIC EXERCISES: CPT | Performed by: PHYSICAL THERAPIST

## 2023-07-12 NOTE — PROGRESS NOTES
[address]                Phone: [phone]   Fax: [phone]    Physical Therapy Daily Treatment Note  Date:  2023    Patient Name:  Tito Jones    :  1975  MRN: [id number]    Evaluating therapist:  LUDIN Anne               (7/3/23)  Restrictions/Precautions:    Diagnosis:  s/p RTC repair R shoulder               (23)  Treatment Diagnosis:    Insurance/Certification information:  [employer]  Referring Physician:  Tori Goyal of care signed (Y/N):    Visit# / total visits:    Pain level: 7/10   Time In:  9610  Time Out:  1503    Subjective:      Exercises:  Exercise/Equipment Resistance/Repetitions Other comments   UBE    3 min F/B           pulleys for flex/abd   5 min ea           table st:  flex 3x20s                   abd 3x20s                    ER 3x20s    pendulums 20x2lb            shrugs 15x3s    scap ret 15x3s    supine wand flex  15x3s                                                       Other Therapeutic Activities:      Home Exercise Program:  provided 7/3/23    Manual Treatments:      Modalities:  IFC/ICE to R shoulder PRN     Timed Code Treatment Minutes: Total Treatment Minutes:      Treatment/Activity Tolerance:  [] Patient tolerated treatment well [] Patient limited by fatique  [] Patient limited by pain  [] Patient limited by other medical complications  [] Other:     Prognosis: [] Good [] Fair  [] Poor    Patient Requires Follow-up: [] Yes  [] No    Plan:   [] Continue per plan of care [] Alter current plan (see comments)  [] Plan of care initiated [] Hold pending MD visit [] Discharge  Plan for Next Session:      See Weekly Progress Note: []  Yes  []  No  Next due:        Electronically signed by:   Neda Mccoy PT

## 2023-07-12 NOTE — PROGRESS NOTES
S:  pt presents to therapy for two of two scheduled visits this week; at week's end he reports that his R shoulder is feeling a bit better since starting therapy; pain level given as 6/10 and he is able to move the arm easier; tigthness continues across all ranges making some ADL's difficult; HEP going well per pt    O:  performed the exercises/treatments as written in the flowsheet for the week ending 7/14/23; iniated HEP for home management of condition; R shoulder AROM:  flex/abd= 120*/90*, IR/ER= L5/back of head; strength across R shoulder grossly 2+, 3-/5 for all ranges    A:  serene tx well; pt able to perform all requested tasks with good form and pacing noted; R shoulder AROM/strengt shows overall improvement across all ranges; movement slow/guarded with some substitution noted at endranges; endurance for all prolonged activities is FAIR+/GOOD-    P:  cont with POC of stretching/strengthening for R shoulder with modalities as needed

## 2023-07-17 ENCOUNTER — HOSPITAL ENCOUNTER (OUTPATIENT)
Dept: PHYSICAL THERAPY | Age: 48
Setting detail: THERAPIES SERIES
Discharge: HOME | End: 2023-07-17
Payer: MEDICAID

## 2023-07-17 PROCEDURE — G0283 ELEC STIM OTHER THAN WOUND: HCPCS | Performed by: PHYSICAL THERAPIST

## 2023-07-17 PROCEDURE — 97110 THERAPEUTIC EXERCISES: CPT | Performed by: PHYSICAL THERAPIST

## 2023-07-17 PROCEDURE — 97530 THERAPEUTIC ACTIVITIES: CPT | Performed by: PHYSICAL THERAPIST

## 2023-07-17 NOTE — PROGRESS NOTES
[address]                Phone: [phone]   Fax: [phone]    Physical Therapy Daily Treatment Note  Date:  2023    Patient Name:  Naeem Logan    :  1975  MRN: [id number]    Evaluating therapist:  LUDIN Lechuga               (7/3/23)  Restrictions/Precautions:    Diagnosis:  s/p RTC repair R shoulder               (23)  Treatment Diagnosis:    Insurance/Certification information:  [employer]  Referring Physician:  Musa Randhawa of care signed (Y/N):    Visit# / total visits:    Pain level: 7/10   Time In:  1433  Time Out:  1534    Subjective:      Exercises:  Exercise/Equipment Resistance/Repetitions Other comments   UBE    3 min F/B           pulleys for flex/abd   5 min ea           table st:  flex 3x20s                   abd 3x20s                    ER 3x20s    pendulums 20x2lb            shrugs 15x3s    scap ret 15x3s    supine wand flex  15x3s                                                       Other Therapeutic Activities:      Home Exercise Program:  provided 7/3/23    Manual Treatments:      Modalities:  IFC/ICE to R shoulder PRN     Timed Code Treatment Minutes: Total Treatment Minutes:      Treatment/Activity Tolerance:  [] Patient tolerated treatment well [] Patient limited by fatique  [] Patient limited by pain  [] Patient limited by other medical complications  [] Other:     Prognosis: [] Good [] Fair  [] Poor    Patient Requires Follow-up: [] Yes  [] No    Plan:   [] Continue per plan of care [] Alter current plan (see comments)  [] Plan of care initiated [] Hold pending MD visit [] Discharge  Plan for Next Session:      See Weekly Progress Note: []  Yes  []  No  Next due:        Electronically signed by:   Kim Jones PT

## 2023-07-19 ENCOUNTER — HOSPITAL ENCOUNTER (OUTPATIENT)
Dept: PHYSICAL THERAPY | Age: 48
Setting detail: THERAPIES SERIES
Discharge: HOME | End: 2023-07-19
Payer: MEDICAID

## 2023-07-19 PROCEDURE — 97530 THERAPEUTIC ACTIVITIES: CPT | Performed by: PHYSICAL THERAPIST

## 2023-07-19 PROCEDURE — G0283 ELEC STIM OTHER THAN WOUND: HCPCS | Performed by: PHYSICAL THERAPIST

## 2023-07-19 PROCEDURE — 97110 THERAPEUTIC EXERCISES: CPT | Performed by: PHYSICAL THERAPIST

## 2023-07-19 NOTE — PROGRESS NOTES
[address]                Phone: [phone]   Fax: [phone]    Physical Therapy Daily Treatment Note  Date:  2023    Patient Name:  Heather Luke    :  1975  MRN: [id number]    Evaluating therapist:  LUDIN Robles               (7/3/23)  Restrictions/Precautions:    Diagnosis:  s/p RTC repair R shoulder               (23)  Treatment Diagnosis:    Insurance/Certification information:  [employer]  Referring Physician:  Demetrice Chris of care signed (Y/N):    Visit# / total visits:    Pain level: 7/10   Time In:  1407  Time Out:  1517    Subjective:      Exercises:  Exercise/Equipment Resistance/Repetitions Other comments   UBE    3 min F/B           pulleys for flex/abd   5 min ea           table st:  flex 3x20s                   abd 3x20s                    ER 3x20s    pendulums 20x2lb            shrugs 15x3s    scap ret 15x3s    supine wand flex  15x3s                                                       Other Therapeutic Activities:      Home Exercise Program:  provided 7/3/23    Manual Treatments:      Modalities:  IFC/ICE to R shoulder PRN     Timed Code Treatment Minutes: Total Treatment Minutes:      Treatment/Activity Tolerance:  [] Patient tolerated treatment well [] Patient limited by fatique  [] Patient limited by pain  [] Patient limited by other medical complications  [] Other:     Prognosis: [] Good [] Fair  [] Poor    Patient Requires Follow-up: [] Yes  [] No    Plan:   [] Continue per plan of care [] Alter current plan (see comments)  [] Plan of care initiated [] Hold pending MD visit [] Discharge  Plan for Next Session:      See Weekly Progress Note: []  Yes  []  No  Next due:        Electronically signed by:   Jaison Gutierrez, PT

## 2023-07-19 NOTE — PROGRESS NOTES
S:  pt presents to therapy for two of two scheduled visits this week; at week's end he reports that his R shoulder feels about the same with pain level steady at 6/10;  tigthness continues across all ranges making some ADL's difficult; HEP going well per pt    O:  performed the exercises/treatments as written in the flowsheet for the week ending 7/21/23;  R shoulder AROM:  flex/abd= 120*/90*, IR/ER= L5/back of head; strength across R shoulder grossly 2+, 3-/5 for all ranges    A:  serene tx well; pt able to perform all requested tasks with good form and pacing noted; R shoulder AROM/strength stable since last week; movement slow/guarded with some substitution noted at endranges; endurance for all prolonged activities is FAIR+/GOOD-    P:  cont with POC of stretching/strengthening for R shoulder with modalities as needed

## 2023-07-24 ENCOUNTER — HOSPITAL ENCOUNTER (OUTPATIENT)
Dept: PHYSICAL THERAPY | Age: 48
Setting detail: THERAPIES SERIES
Discharge: HOME | End: 2023-07-24
Payer: MEDICAID

## 2023-07-24 PROCEDURE — G0283 ELEC STIM OTHER THAN WOUND: HCPCS | Performed by: PHYSICAL THERAPIST

## 2023-07-24 PROCEDURE — 97110 THERAPEUTIC EXERCISES: CPT | Performed by: PHYSICAL THERAPIST

## 2023-07-24 PROCEDURE — 97530 THERAPEUTIC ACTIVITIES: CPT | Performed by: PHYSICAL THERAPIST

## 2023-07-24 NOTE — PROGRESS NOTES
[address]                Phone: [phone]   Fax: [phone]    Physical Therapy Daily Treatment Note  Date:  2023    Patient Name:  Dora Shelton    :  1975  MRN: [id number]    Evaluating therapist:  LUDIN Rodrigez               (7/3/23)  Restrictions/Precautions:    Diagnosis:  s/p RTC repair R shoulder               (23)  Treatment Diagnosis:    Insurance/Certification information:  [employer]  Referring Physician:  [facility] of care signed (Y/N):    Visit# / total visits:    Pain level: 7/10   Time In:  1432  Time Out:  1524    Subjective:      Exercises:  Exercise/Equipment Resistance/Repetitions Other comments   UBE    3 min F/B           pulleys for flex/abd   5 min ea           table st:  flex 3x20s                   abd 3x20s                    ER 3x20s    pendulums 20x2lb            shrugs 15x3s    scap ret 15x3s    supine wand flex  15x3s           H/M  pulls      rows     hor abd            standing rings              Other Therapeutic Activities:      Home Exercise Program:  provided 7/3/23    Manual Treatments:      Modalities:  IFC/ICE to R shoulder PRN     Timed Code Treatment Minutes: Total Treatment Minutes:      Treatment/Activity Tolerance:  [] Patient tolerated treatment well [] Patient limited by fatique  [] Patient limited by pain  [] Patient limited by other medical complications  [] Other:     Prognosis: [] Good [] Fair  [] Poor    Patient Requires Follow-up: [] Yes  [] No    Plan:   [] Continue per plan of care [] Alter current plan (see comments)  [] Plan of care initiated [] Hold pending MD visit [] Discharge  Plan for Next Session:      See Weekly Progress Note: []  Yes  []  No  Next due:        Electronically signed by:   Lynne Major PT

## 2023-07-26 ENCOUNTER — HOSPITAL ENCOUNTER (OUTPATIENT)
Dept: PHYSICAL THERAPY | Age: 48
Setting detail: THERAPIES SERIES
Discharge: HOME | End: 2023-07-26
Payer: MEDICAID

## 2023-07-26 PROCEDURE — G0283 ELEC STIM OTHER THAN WOUND: HCPCS | Performed by: PHYSICAL THERAPIST

## 2023-07-26 PROCEDURE — 97110 THERAPEUTIC EXERCISES: CPT | Performed by: PHYSICAL THERAPIST

## 2023-07-26 PROCEDURE — 97530 THERAPEUTIC ACTIVITIES: CPT | Performed by: PHYSICAL THERAPIST

## 2023-07-26 NOTE — PROGRESS NOTES
S:  pt presents to therapy for two of two scheduled visits this week; at week's end he reports that his R shoulder feels about the same with pain level steady at 6/10;  tigthness continues across all ranges making some ADL's difficult; HEP going well per pt    O:  performed the exercises/treatments as written in the flowsheet for the week ending 7/28/23;  R shoulder AROM:  flex/abd= 120*/90*, IR/ER= L5/back of head; strength across R shoulder grossly 2+, 3-/5 for all ranges    A:  serene tx well; pt able to perform all requested tasks with good form and pacing noted; R shoulder AROM/strength again stable since last week; movement slow/guarded with some substitution noted at endranges; endurance for all prolonged activities is FAIR+/GOOD-    P:  cont with POC of stretching/strengthening for R shoulder with modalities as needed

## 2023-07-26 NOTE — PROGRESS NOTES
[address]                Phone: [phone]   Fax: [phone]    Physical Therapy Daily Treatment Note  Date:  2023    Patient Name:  Vance Fermin    :  1975  MRN: [id number]    Evaluating therapist:  LUDIN Sears               (7/3/23)  Restrictions/Precautions:    Diagnosis:  s/p RTC repair R shoulder               (23)  Treatment Diagnosis:    Insurance/Certification information:  [place]  Referring Physician:  Eitan Blunt of care signed (Y/N):    Visit# / total visits:    Pain level: 7/10   Time In:  1400  Time Out:  1518    Subjective:      Exercises:  Exercise/Equipment Resistance/Repetitions Other comments   UBE    3 min F/B           pulleys for flex/abd   5 min ea           table st:  flex 3x20s                   abd 3x20s                    ER 3x20s    pendulums 20x2lb            shrugs 15x3s    scap ret 15x3s    supine wand flex  15x3s           H/M  pulls      rows     hor abd            standing rings              Other Therapeutic Activities:      Home Exercise Program:  provided 7/3/23    Manual Treatments:      Modalities:  IFC/ICE to R shoulder PRN     Timed Code Treatment Minutes: Total Treatment Minutes:      Treatment/Activity Tolerance:  [] Patient tolerated treatment well [] Patient limited by fatique  [] Patient limited by pain  [] Patient limited by other medical complications  [] Other:     Prognosis: [] Good [] Fair  [] Poor    Patient Requires Follow-up: [] Yes  [] No    Plan:   [] Continue per plan of care [] Alter current plan (see comments)  [] Plan of care initiated [] Hold pending MD visit [] Discharge  Plan for Next Session:      See Weekly Progress Note: []  Yes  []  No  Next due:        Electronically signed by:   Ralf Caceres PT

## 2023-07-31 ENCOUNTER — HOSPITAL ENCOUNTER (OUTPATIENT)
Dept: PHYSICAL THERAPY | Age: 48
Setting detail: THERAPIES SERIES
Discharge: HOME | End: 2023-07-31
Payer: MEDICAID

## 2023-07-31 PROCEDURE — 97110 THERAPEUTIC EXERCISES: CPT | Performed by: PHYSICAL THERAPIST

## 2023-07-31 PROCEDURE — G0283 ELEC STIM OTHER THAN WOUND: HCPCS | Performed by: PHYSICAL THERAPIST

## 2023-07-31 PROCEDURE — 97530 THERAPEUTIC ACTIVITIES: CPT | Performed by: PHYSICAL THERAPIST

## 2023-07-31 NOTE — PROGRESS NOTES
[address]                Phone: [phone]   Fax: [phone]    Physical Therapy Daily Treatment Note  Date:  2023    Patient Name:  Helga Hallman    :  1975  MRN: [id number]    Evaluating therapist:  LUDIN Blackman               (7/3/23)  Restrictions/Precautions:    Diagnosis:  s/p RTC repair R shoulder               (23)  Treatment Diagnosis:    Insurance/Certification information:  [place]  Referring Physician:  Romana Calico of care signed (Y/N):    Visit# / total visits:    Pain level: 7/10   Time In:  1348  Time Out:  1513    Subjective:      Exercises:  Exercise/Equipment Resistance/Repetitions Other comments   UBE    3 min F/B           pulleys for flex/abd   5 min ea           table st:  flex 3x20s                   abd 3x20s                    ER 3x20s    pendulums 20x2lb            shrugs 15x3s    scap ret 15x3s    supine wand flex  15x3s           H/M  pulls  15xgr    rows 15xgr    hor abd 15xgr           standing rings [id number]             Other Therapeutic Activities:      Home Exercise Program:  provided 7/3/23; 23    Manual Treatments:      Modalities:  IFC/ICE to R shoulder PRN     Timed Code Treatment Minutes: Total Treatment Minutes:      Treatment/Activity Tolerance:  [] Patient tolerated treatment well [] Patient limited by fatique  [] Patient limited by pain  [] Patient limited by other medical complications  [] Other:     Prognosis: [] Good [] Fair  [] Poor    Patient Requires Follow-up: [] Yes  [] No    Plan:   [] Continue per plan of care [] Alter current plan (see comments)  [] Plan of care initiated [] Hold pending MD visit [] Discharge  Plan for Next Session:      See Weekly Progress Note: []  Yes  []  No  Next due:        Electronically signed by:   Lev Bean PT

## 2023-08-01 ENCOUNTER — HOSPITAL ENCOUNTER (OUTPATIENT)
Dept: PHYSICAL THERAPY | Age: 48
Setting detail: THERAPIES SERIES
End: 2023-08-01
Payer: MEDICAID

## 2023-08-07 ENCOUNTER — HOSPITAL ENCOUNTER (OUTPATIENT)
Dept: PHYSICAL THERAPY | Age: 48
Setting detail: THERAPIES SERIES
Discharge: HOME | End: 2023-08-07
Payer: MEDICAID

## 2023-08-07 PROCEDURE — 97530 THERAPEUTIC ACTIVITIES: CPT | Performed by: PHYSICAL THERAPIST

## 2023-08-07 PROCEDURE — 97110 THERAPEUTIC EXERCISES: CPT | Performed by: PHYSICAL THERAPIST

## 2023-08-07 PROCEDURE — G0283 ELEC STIM OTHER THAN WOUND: HCPCS | Performed by: PHYSICAL THERAPIST

## 2023-08-07 NOTE — PROGRESS NOTES
[address]                Phone: [phone]   Fax: [phone]    Physical Therapy Daily Treatment Note  Date:  2023    Patient Name:  Naeem Logan    :  1975  MRN: [id number]    Evaluating therapist:  LUDIN Lechuga               (7/3/23)  Restrictions/Precautions:    Diagnosis:  s/p RTC repair R shoulder               (23)  Treatment Diagnosis:    Insurance/Certification information:  [place]  Referring Physician:  Musa Randhawa of care signed (Y/N):    Visit# / total visits:  10/18  Pain level: 7/10   Time In:  1349  Time Out:  1519    Subjective:      Exercises:  Exercise/Equipment Resistance/Repetitions Other comments   UBE    3 min F/B           pulleys for flex/abd   5 min ea           table st:  flex 3x20s                   abd 3x20s                    ER 3x20s    pendulums 20x2lb            shrugs 15x3s    scap ret 15x3s    supine wand flex  15x3s           H/M  pulls  15xgr    rows 15xgr    hor abd 15xgr           standing rings [id number]             Other Therapeutic Activities:      Home Exercise Program:  provided 7/3/23; 23    Manual Treatments:      Modalities:  IFC/ICE to R shoulder PRN     Timed Code Treatment Minutes: Total Treatment Minutes:      Treatment/Activity Tolerance:  [] Patient tolerated treatment well [] Patient limited by fatique  [] Patient limited by pain  [] Patient limited by other medical complications  [] Other:     Prognosis: [] Good [] Fair  [] Poor    Patient Requires Follow-up: [] Yes  [] No    Plan:   [] Continue per plan of care [] Alter current plan (see comments)  [] Plan of care initiated [] Hold pending MD visit [] Discharge  Plan for Next Session:      See Weekly Progress Note: []  Yes  []  No  Next due:        Electronically signed by:   Kim Jones PT

## 2023-08-10 ENCOUNTER — HOSPITAL ENCOUNTER (OUTPATIENT)
Dept: PHYSICAL THERAPY | Age: 48
Setting detail: THERAPIES SERIES
Discharge: HOME | End: 2023-08-10
Payer: MEDICAID

## 2023-08-10 NOTE — PROGRESS NOTES
S:  pt presents to therapy for one of two scheduled visits this week, having cancelled on 8/10/23; at week's only visit he reported that his R shoulder feels about the same with pain level steady at 6/10;  tigthness continues across all ranges making some ADL's difficult; HEP going well per pt    O:  performed the exercises/treatments as written in the flowsheet for the week ending 8/11/23;  R shoulder AROM:  flex/abd= 120*/90*, IR/ER= L5/back of head; strength across R shoulder grossly 2+, 3-/5 for all ranges    A:  serene tx well; pt able to perform all requested tasks with good form and pacing noted; R shoulder AROM/strength again stable since last week; movement slow/guarded with some substitution noted at endranges; endurance for all prolonged activities is FAIR+/GOOD-    P:  cont with POC of stretching/strengthening for R shoulder with modalities as needed

## 2023-08-15 ENCOUNTER — HOSPITAL ENCOUNTER (OUTPATIENT)
Dept: PHYSICAL THERAPY | Age: 48
Setting detail: THERAPIES SERIES
Discharge: HOME | End: 2023-08-15
Payer: MEDICAID

## 2023-08-15 PROCEDURE — 97530 THERAPEUTIC ACTIVITIES: CPT | Performed by: PHYSICAL THERAPIST

## 2023-08-15 PROCEDURE — G0283 ELEC STIM OTHER THAN WOUND: HCPCS | Performed by: PHYSICAL THERAPIST

## 2023-08-15 PROCEDURE — 97110 THERAPEUTIC EXERCISES: CPT | Performed by: PHYSICAL THERAPIST

## 2023-08-15 NOTE — PROGRESS NOTES
[address]                Phone: [phone]   Fax: [phone]    Physical Therapy Daily Treatment Note  Date:  8/15/2023    Patient Name:  Yoselin Pelayo    :  1975  MRN: [id number]    Evaluating therapist:  LUDIN Rizvi               (7/3/23)  Restrictions/Precautions:    Diagnosis:  s/p RTC repair R shoulder               (23)  Treatment Diagnosis:    Insurance/Certification information:  [place]  Referring Physician:  Warren Goyal of care signed (Y/N):    Visit# / total visits:    Pain level: 7/10   Time In:  7222  Time Out:  1453    Subjective:      Exercises:  Exercise/Equipment Resistance/Repetitions Other comments   UBE    3 min F/B           pulleys for flex/abd   5 min ea           table st:  flex 3x20s                   abd 3x20s                    ER 3x20s    pendulums 20x2lb            shrugs 15x3s    scap ret 15x3s    supine wand flex  [id number] standing 23          H/M  pulls  15xgr    rows 15xgr    hor abd 15xgr           standing rings [id number]             Other Therapeutic Activities:      Home Exercise Program:  provided 7/3/23; 23    Manual Treatments:      Modalities:  IFC/ICE to R shoulder PRN     Timed Code Treatment Minutes: Total Treatment Minutes:      Treatment/Activity Tolerance:  [] Patient tolerated treatment well [] Patient limited by fatique  [] Patient limited by pain  [] Patient limited by other medical complications  [] Other:     Prognosis: [] Good [] Fair  [] Poor    Patient Requires Follow-up: [] Yes  [] No    Plan:   [] Continue per plan of care [] Alter current plan (see comments)  [] Plan of care initiated [] Hold pending MD visit [] Discharge  Plan for Next Session:      See Weekly Progress Note: []  Yes  []  No  Next due:        Electronically signed by:   Phi Doss PT

## 2023-08-17 ENCOUNTER — HOSPITAL ENCOUNTER (OUTPATIENT)
Dept: PHYSICAL THERAPY | Age: 48
Setting detail: THERAPIES SERIES
Discharge: HOME | End: 2023-08-17
Payer: MEDICAID

## 2023-08-17 NOTE — PROGRESS NOTES
[address]                Phone: [phone]  Fax: [phone]    Physical Therapy  Cancellation/No-show Note  Patient Name:  Naeem Logan  :  1975   Date:  2023    For today's appointment patient:  []  Cancelled  []  Rescheduled appointment  [x]  No-show     Reason given by patient:  []  Patient ill  []  Conflicting appointment  []  No transportation    []  Conflict with work  [x]  No reason given  []  Other:     Comments:      Electronically signed by:   Kim Jones PT

## 2023-08-17 NOTE — PROGRESS NOTES
S:  pt presents to therapy for one of two scheduled visits this week, having been a no-show on 8/17/23; at week's only visit he reported that his R shoulder is doing OK;  pain level steady at 6/10;  tigthness continues across all ranges making some ADL's difficult; HEP going well per pt    O:  performed the exercises/treatments as written in the flowsheet for the week ending 8/18/23;  R shoulder AROM:  flex/abd= 120*/90*, IR/ER= L5/back of head; strength across R shoulder grossly 2+, 3-/5 for all ranges    A:  serene tx well; pt able to perform all requested tasks with good form and pacing noted; R shoulder AROM/strength again stable since last week; movement slow/guarded with some substitution noted at endranges; endurance for all prolonged activities is FAIR+/GOOD-    P:  cont with POC of stretching/strengthening for R shoulder with modalities as needed

## 2023-08-29 ENCOUNTER — HOSPITAL ENCOUNTER (OUTPATIENT)
Dept: PHYSICAL THERAPY | Age: 48
Setting detail: THERAPIES SERIES
Discharge: HOME | End: 2023-08-29
Payer: MEDICAID

## 2023-08-29 PROCEDURE — 97530 THERAPEUTIC ACTIVITIES: CPT | Performed by: PHYSICAL THERAPIST

## 2023-08-29 PROCEDURE — G0283 ELEC STIM OTHER THAN WOUND: HCPCS | Performed by: PHYSICAL THERAPIST

## 2023-08-29 PROCEDURE — 97110 THERAPEUTIC EXERCISES: CPT | Performed by: PHYSICAL THERAPIST

## 2023-08-29 NOTE — PROGRESS NOTES
[address]                Phone: [phone]   Fax: [phone]    Physical Therapy Daily Treatment Note  Date:  2023    Patient Name:  Dima Henao    :  1975  MRN: [id number]    Evaluating therapist:  LUDIN Lacy               (7/3/23)  Restrictions/Precautions:    Diagnosis:  s/p RTC repair R shoulder               (23)  Treatment Diagnosis:    Insurance/Certification information:  [place]  Referring Physician:  Rudine Cogan of care signed (Y/N):    Visit# / total visits:    Pain level: 7/10   Time In:  1451  Time Out:  1607    Subjective:      Exercises:  Exercise/Equipment Resistance/Repetitions Other comments   UBE    3 min F/B           pulleys for flex/abd   5 min ea           table st:  flex 3x20s                   abd 3x20s                    ER 3x20s    pendulums 20x2lb            shrugs 15x3s    scap ret 15x3s    standing wand flex  [id number]           H/M  pulls  15xgr    rows 15xgr    hor abd 15xgr           standing rings [id number]             Other Therapeutic Activities:      Home Exercise Program:  provided 7/3/23; 23    Manual Treatments:      Modalities:  IFC/ICE to R shoulder PRN     Timed Code Treatment Minutes: Total Treatment Minutes:      Treatment/Activity Tolerance:  [] Patient tolerated treatment well [] Patient limited by fatique  [] Patient limited by pain  [] Patient limited by other medical complications  [] Other:     Prognosis: [] Good [] Fair  [] Poor    Patient Requires Follow-up: [] Yes  [] No    Plan:   [] Continue per plan of care [] Alter current plan (see comments)  [] Plan of care initiated [] Hold pending MD visit [] Discharge  Plan for Next Session:      See Weekly Progress Note: []  Yes  []  No  Next due:        Electronically signed by:   Agnes Virk PT

## 2023-08-31 ENCOUNTER — HOSPITAL ENCOUNTER (OUTPATIENT)
Dept: PHYSICAL THERAPY | Age: 48
Setting detail: THERAPIES SERIES
Discharge: HOME | End: 2023-08-31
Payer: MEDICAID

## 2023-08-31 PROCEDURE — G0283 ELEC STIM OTHER THAN WOUND: HCPCS | Performed by: PHYSICAL THERAPIST

## 2023-08-31 PROCEDURE — 97110 THERAPEUTIC EXERCISES: CPT | Performed by: PHYSICAL THERAPIST

## 2023-08-31 PROCEDURE — 97530 THERAPEUTIC ACTIVITIES: CPT | Performed by: PHYSICAL THERAPIST

## 2023-08-31 NOTE — PROGRESS NOTES
S:  pt presents to therapy for two of two scheduled visits this week; at week's end he reports that his R shoulder is doing OK;  pain level steady at 6/10;  tigthness continues across all ranges making some ADL's difficult; HEP going well per pt    O:  performed the exercises/treatments as written in the flowsheet for the week ending 9/1/23;  R shoulder AROM:  flex/abd= 120*/90*, IR/ER= L5/back of head; strength across R shoulder grossly 3-, 3/5 for all ranges    A:  serene tx well; pt able to perform all requested tasks with good form and pacing noted; R shoulder AROM/strength again stable since last week; movement slow/guarded with some substitution noted at endranges; endurance for all prolonged activities is FAIR+/GOOD-    P:  cont with POC of stretching/strengthening for R shoulder with modalities as needed

## 2023-08-31 NOTE — PROGRESS NOTES
[address]                Phone: [phone]   Fax: [phone]    Physical Therapy Daily Treatment Note  Date:  2023    Patient Name:  Ty Stoner    :  1975  MRN: [id number]    Evaluating therapist:  LUDIN Mart               (7/3/23)  Restrictions/Precautions:    Diagnosis:  s/p RTC repair R shoulder               (23)  Treatment Diagnosis:    Insurance/Certification information:  [place]  Referring Physician:  Misael Hooks of care signed (Y/N):    Visit# / total visits:    Pain level: 7/10   Time In:  3100  Time Out:  1614    Subjective:      Exercises:  Exercise/Equipment Resistance/Repetitions Other comments   UBE    3 min F/B           pulleys for flex/abd   5 min ea           table st:  flex 3x20s                   abd 3x20s                    ER 3x20s    pendulums 20x3lb            shrugs 15x3s    scap ret 15x3s    standing wand flex  [id number]           H/M  pulls  15xblue    rows 15xblue     hor abd 15xblue           standing rings [id number]             Other Therapeutic Activities:      Home Exercise Program:  provided 7/3/23; 23    Manual Treatments:      Modalities:  IFC/ICE to R shoulder PRN     Timed Code Treatment Minutes: Total Treatment Minutes:      Treatment/Activity Tolerance:  [] Patient tolerated treatment well [] Patient limited by fatique  [] Patient limited by pain  [] Patient limited by other medical complications  [] Other:     Prognosis: [] Good [] Fair  [] Poor    Patient Requires Follow-up: [] Yes  [] No    Plan:   [] Continue per plan of care [] Alter current plan (see comments)  [] Plan of care initiated [] Hold pending MD visit [] Discharge  Plan for Next Session:      See Weekly Progress Note: []  Yes  []  No  Next due:        Electronically signed by:   Poppy Jones PT

## 2023-09-07 ENCOUNTER — HOSPITAL ENCOUNTER (OUTPATIENT)
Dept: PHYSICAL THERAPY | Age: 48
Setting detail: THERAPIES SERIES
Discharge: HOME | End: 2023-09-07

## 2023-09-07 NOTE — PROGRESS NOTES
[address] [place]                Phone: [phone]  Fax: [phone]    Physical Therapy  Cancellation/No-show Note  Patient Name:  Denise Benavides  :  1975   Date:  2023    For today's appointment patient:  [x]  Cancelled  []  Rescheduled appointment  []  No-show     Reason given by patient:  []  Patient ill  []  Conflicting appointment  []  No transportation    []  Conflict with work  [x]  No reason given  []  Other:     Comments:      Electronically signed by:   Jareth Nicole PT

## 2023-09-12 ENCOUNTER — HOSPITAL ENCOUNTER (OUTPATIENT)
Dept: PHYSICAL THERAPY | Age: 48
Setting detail: THERAPIES SERIES
Discharge: HOME | End: 2023-09-12
Payer: MEDICAID

## 2023-09-12 PROCEDURE — 97110 THERAPEUTIC EXERCISES: CPT | Performed by: PHYSICAL THERAPIST

## 2023-09-12 PROCEDURE — 97530 THERAPEUTIC ACTIVITIES: CPT | Performed by: PHYSICAL THERAPIST

## 2023-09-12 NOTE — PROGRESS NOTES
[address]                Phone: [phone]   Fax: [phone]    Physical Therapy Daily Treatment Note  Date:  2023    Patient Name:  Lenin Anaya    :  1975  MRN: [id number]    Evaluating therapist:  LUDIN Gibson               (7/3/23)  Restrictions/Precautions:    Diagnosis:  s/p RTC repair R shoulder               (23)  Treatment Diagnosis:    Insurance/Certification information:  [place]  Referring Physician:  Carmen  of care signed (Y/N):    Visit# / total visits:    Pain level: 7/10   Time In:  1358  Time Out:  1450    Subjective:      Exercises:  Exercise/Equipment Resistance/Repetitions Other comments   UBE    3 min F/B           pulleys for flex/abd   5 min ea           table st:  flex 3x20s                   abd 3x20s                    ER 3x20s    pendulums 20x3lb            shrugs 15x3s    scap ret 15x3s    standing wand flex  [id number]           H/M  pulls  15xblue    rows 15xblue     hor abd 15xblue           standing rings [id number]             Other Therapeutic Activities:      Home Exercise Program:  provided 7/3/23; 23    Manual Treatments:      Modalities:  IFC/ICE to R shoulder PRN     Timed Code Treatment Minutes: Total Treatment Minutes:      Treatment/Activity Tolerance:  [] Patient tolerated treatment well [] Patient limited by fatique  [] Patient limited by pain  [] Patient limited by other medical complications  [] Other:     Prognosis: [] Good [] Fair  [] Poor    Patient Requires Follow-up: [] Yes  [] No    Plan:   [] Continue per plan of care [] Alter current plan (see comments)  [] Plan of care initiated [] Hold pending MD visit [] Discharge  Plan for Next Session:      See Weekly Progress Note: []  Yes  []  No  Next due:        Electronically signed by:   Abdiel Hardyr, PT

## 2023-09-14 ENCOUNTER — HOSPITAL ENCOUNTER (OUTPATIENT)
Dept: PHYSICAL THERAPY | Age: 48
Setting detail: THERAPIES SERIES
Discharge: HOME | End: 2023-09-14
Payer: MEDICAID

## 2023-09-14 NOTE — PROGRESS NOTES
S:  pt presents to therapy for one of two scheduled visits this week, having been a no-show for 9/14/23; at week's only visit he reported  that his R shoulder is doing OK;  pain level steady at 6/10;  tigthness continues across all ranges making some ADL's difficult; HEP going well per pt    O:  performed the exercises/treatments as written in the flowsheet for the week ending 9/15/23;  R shoulder AROM:  flex/abd= 140*/120*, IR/ER= L5/back of head; strength across R shoulder grossly  3+/5 for all ranges    A:  serene tx well; pt able to perform all requested tasks with good form and pacing noted; R shoulder AROM/strength showed significant improvement overall;  movement slow/guarded with some substitution noted at endranges; endurance for all prolonged activities is GOOD-    P:  cont with POC of stretching/strengthening for R shoulder with modalities as needed

## 2023-09-14 NOTE — PROGRESS NOTES
[address]                Phone: [phone]  Fax: [phone]    Physical Therapy  Cancellation/No-show Note  Patient Name:  Vance Fermin  :  1975   Date:  2023    For today's appointment patient:  []  Cancelled  []  Rescheduled appointment  [x]  No-show     Reason given by patient:  []  Patient ill  []  Conflicting appointment  []  No transportation    []  Conflict with work  [x]  No reason given  []  Other:     Comments:      Electronically signed by:   Ralf Caceres PT

## 2023-09-21 ENCOUNTER — HOSPITAL ENCOUNTER (OUTPATIENT)
Dept: PHYSICAL THERAPY | Age: 48
Setting detail: THERAPIES SERIES
Discharge: HOME | End: 2023-09-21
Payer: MEDICAID

## 2023-09-21 PROCEDURE — 97110 THERAPEUTIC EXERCISES: CPT | Performed by: PHYSICAL THERAPIST

## 2023-09-21 PROCEDURE — 97530 THERAPEUTIC ACTIVITIES: CPT | Performed by: PHYSICAL THERAPIST

## 2023-09-21 NOTE — PROGRESS NOTES
[address]                Phone: [phone]   Fax: [phone]    Physical Therapy Daily Treatment Note  Date:  2023    Patient Name:  Rubina Mir    :  1975  MRN: [id number]    Evaluating therapist:  LUDIN Quick               (7/3/23)  Restrictions/Precautions:    Diagnosis:  s/p RTC repair R shoulder               (23)  Treatment Diagnosis:    Insurance/Certification information:  [place]  Referring Physician:  Ej Elizondo of care signed (Y/N):    Visit# / total visits:  15/18  Pain level: 7/10   Time In:  0665  Time Out:      Subjective:      Exercises:  Exercise/Equipment Resistance/Repetitions Other comments   UBE    3 min F/B           pulleys for flex/abd   5 min ea           table st:  flex 3x20s                   abd 3x20s                    ER 3x20s    pendulums 20x3lb            shrugs 15x3s    scap ret 15x3s    standing wand flex  [id number]           H/M  pulls  15xpurple    rows 15xpurple    hor abd 15xpurple            standing rings [id number]             Other Therapeutic Activities:      Home Exercise Program:  provided 7/3/23; 23    Manual Treatments:      Modalities:  IFC/ICE to R shoulder PRN     Timed Code Treatment Minutes: Total Treatment Minutes:      Treatment/Activity Tolerance:  [] Patient tolerated treatment well [] Patient limited by fatique  [] Patient limited by pain  [] Patient limited by other medical complications  [] Other:     Prognosis: [] Good [] Fair  [] Poor    Patient Requires Follow-up: [] Yes  [] No    Plan:   [] Continue per plan of care [] Alter current plan (see comments)  [] Plan of care initiated [] Hold pending MD visit [] Discharge  Plan for Next Session:      See Weekly Progress Note: []  Yes  []  No  Next due:        Electronically signed by:   Remedios Davidson PT

## 2023-09-21 NOTE — PROGRESS NOTES
S:  pt presents to therapy for only scheduled visit for the week; at this time he reports that his R shoulder is doing OK;  pain level steady at 4/10;  tigthness continues across all ranges making some ADL's difficult; HEP going well per pt    O:  performed the exercises/treatments as written in the flowsheet for the week ending 9/22/23;  R shoulder AROM:  flex/abd= 140*/120*, IR/ER= L5/back of head; strength across R shoulder grossly  3+/5 for all ranges    A:  serene tx well; pt able to perform all requested tasks with good form and pacing noted; R shoulder AROM/strength showed significant improvement overall;  movement slow/guarded with some substitution noted at endranges; endurance for all prolonged activities is GOOD-    P:  cont with POC of stretching/strengthening for R shoulder with modalities as needed

## 2023-09-25 ENCOUNTER — HOSPITAL ENCOUNTER (OUTPATIENT)
Dept: PHYSICAL THERAPY | Age: 48
Setting detail: THERAPIES SERIES
Discharge: HOME | End: 2023-09-25
Payer: MEDICAID

## 2023-09-25 PROCEDURE — 97110 THERAPEUTIC EXERCISES: CPT | Performed by: PHYSICAL THERAPIST

## 2023-09-25 PROCEDURE — 97530 THERAPEUTIC ACTIVITIES: CPT | Performed by: PHYSICAL THERAPIST

## 2023-09-25 NOTE — PROGRESS NOTES
[address]                Phone: [phone]   Fax: [phone]    Physical Therapy Daily Treatment Note  Date:  2023    Patient Name:  Michelle Ratliff    :  1975  MRN: [id number]    Evaluating therapist:  LDUIN Pisano               (7/3/23)  Restrictions/Precautions:    Diagnosis:  s/p RTC repair R shoulder               (23)  Treatment Diagnosis:    Insurance/Certification information:  [place]  Referring Physician:  Sylvia Cohn of care signed (Y/N):    Visit# / total visits:    Pain level: 7/10   Time In:  1411  Time Out:  1506    Subjective:      Exercises:  Exercise/Equipment Resistance/Repetitions Other comments   UBE    3 min F/B           pulleys for flex/abd   5 min ea           table st:  flex 3x20s                   abd 3x20s                    ER 3x20s    pendulums 20x3lb            shrugs 15x3s    scap ret 15x3s    standing wand flex  [id number]           H/M  pulls  15xpurple    rows 15xpurple    hor abd 15xgreen            standing rings [id number]             Other Therapeutic Activities:      Home Exercise Program:  provided 7/3/23; 23    Manual Treatments:      Modalities:  IFC/ICE to R shoulder PRN     Timed Code Treatment Minutes: Total Treatment Minutes:      Treatment/Activity Tolerance:  [] Patient tolerated treatment well [] Patient limited by fatique  [] Patient limited by pain  [] Patient limited by other medical complications  [] Other:     Prognosis: [] Good [] Fair  [] Poor    Patient Requires Follow-up: [] Yes  [] No    Plan:   [] Continue per plan of care [] Alter current plan (see comments)  [] Plan of care initiated [] Hold pending MD visit [] Discharge  Plan for Next Session:      See Weekly Progress Note: []  Yes  []  No  Next due:        Electronically signed by:   Lary Schultz PT

## 2023-09-27 ENCOUNTER — HOSPITAL ENCOUNTER (OUTPATIENT)
Dept: PHYSICAL THERAPY | Age: 48
Setting detail: THERAPIES SERIES
Discharge: HOME | End: 2023-09-27
Payer: MEDICAID

## 2023-09-27 NOTE — PROGRESS NOTES
[address]                Phone: [phone]  Fax: [phone]    Physical Therapy  Cancellation/No-show Note  Patient Name:  Naeem Logan  :  1975   Date:  2023    For today's appointment patient:  []  Cancelled  []  Rescheduled appointment  [x]  No-show     Reason given by patient:  []  Patient ill  []  Conflicting appointment  []  No transportation    []  Conflict with work  [x]  No reason given  []  Other:     Comments:      Electronically signed by:   Kim Jones PT

## 2023-10-05 ENCOUNTER — HOSPITAL ENCOUNTER (OUTPATIENT)
Dept: PHYSICAL THERAPY | Age: 48
Setting detail: THERAPIES SERIES
Discharge: HOME | End: 2023-10-05

## 2023-10-05 NOTE — PROGRESS NOTES
[address] [place]                Phone: [phone]  Fax: [phone]    Physical Therapy  Cancellation/No-show Note  Patient Name:  Lenin Anaya  :  1975   Date:  10/5/2023    For today's appointment patient:  []  Cancelled  []  Rescheduled appointment  [x]  No-show     Reason given by patient:  []  Patient ill  []  Conflicting appointment  []  No transportation    []  Conflict with work  [x]  No reason given  []  Other:     Comments:      Electronically signed by:   Abdiel Officer, PT

## 2024-03-04 ENCOUNTER — COMMUNITY OUTREACH (OUTPATIENT)
Dept: PRIMARY CARE CLINIC | Age: 49
End: 2024-03-04

## 2024-03-04 NOTE — PROGRESS NOTES
Patient's HM shows they are overdue for Colonoscopy   CareEverywhere and  files searched without success.

## 2025-02-04 ENCOUNTER — APPOINTMENT (OUTPATIENT)
Dept: GENERAL RADIOLOGY | Age: 50
End: 2025-02-04
Payer: MEDICAID

## 2025-02-04 ENCOUNTER — HOSPITAL ENCOUNTER (EMERGENCY)
Age: 50
Discharge: HOME | End: 2025-02-04
Payer: MEDICAID

## 2025-02-04 VITALS
RESPIRATION RATE: 18 BRPM | HEART RATE: 93 BPM | SYSTOLIC BLOOD PRESSURE: 126 MMHG | OXYGEN SATURATION: 99 % | TEMPERATURE: 98.6 F | DIASTOLIC BLOOD PRESSURE: 86 MMHG

## 2025-02-04 DIAGNOSIS — J10.1 INFLUENZA A: Primary | ICD-10-CM

## 2025-02-04 LAB
ANION GAP SERPL CALCULATED.3IONS-SCNC: 11 MMOL/L (ref 7–16)
BASOPHILS # BLD: 0.02 K/UL (ref 0–0.2)
BASOPHILS NFR BLD: 0 % (ref 0–2)
BUN SERPL-MCNC: 13 MG/DL (ref 6–20)
CALCIUM SERPL-MCNC: 9.1 MG/DL (ref 8.6–10.2)
CHLORIDE SERPL-SCNC: 98 MMOL/L (ref 98–107)
CO2 SERPL-SCNC: 25 MMOL/L (ref 22–29)
CREAT SERPL-MCNC: 1.2 MG/DL (ref 0.7–1.2)
D-DIMER QUANTITATIVE: 215 NG/ML DDU (ref 0–230)
EOSINOPHIL # BLD: 0 K/UL (ref 0.05–0.5)
EOSINOPHILS RELATIVE PERCENT: 0 % (ref 0–6)
ERYTHROCYTE [DISTWIDTH] IN BLOOD BY AUTOMATED COUNT: 13.1 % (ref 11.5–15)
FLUAV RNA RESP QL NAA+PROBE: DETECTED
FLUBV RNA RESP QL NAA+PROBE: NOT DETECTED
GFR, ESTIMATED: 75 ML/MIN/1.73M2
GLUCOSE BLD-MCNC: 109 MG/DL (ref 74–99)
GLUCOSE SERPL-MCNC: 119 MG/DL (ref 74–99)
HCT VFR BLD AUTO: 49.4 % (ref 37–54)
HGB BLD-MCNC: 16.2 G/DL (ref 12.5–16.5)
IMM GRANULOCYTES # BLD AUTO: 0.04 K/UL (ref 0–0.58)
IMM GRANULOCYTES NFR BLD: 1 % (ref 0–5)
LYMPHOCYTES NFR BLD: 0.63 K/UL (ref 1.5–4)
LYMPHOCYTES RELATIVE PERCENT: 7 % (ref 20–42)
MCH RBC QN AUTO: 28.6 PG (ref 26–35)
MCHC RBC AUTO-ENTMCNC: 32.8 G/DL (ref 32–34.5)
MCV RBC AUTO: 87.3 FL (ref 80–99.9)
MONOCYTES NFR BLD: 0.46 K/UL (ref 0.1–0.95)
MONOCYTES NFR BLD: 5 % (ref 2–12)
NEUTROPHILS NFR BLD: 87 % (ref 43–80)
NEUTS SEG NFR BLD: 7.57 K/UL (ref 1.8–7.3)
PLATELET # BLD AUTO: 211 K/UL (ref 130–450)
PMV BLD AUTO: 10.3 FL (ref 7–12)
POTASSIUM SERPL-SCNC: 3.7 MMOL/L (ref 3.5–5)
RBC # BLD AUTO: 5.66 M/UL (ref 3.8–5.8)
SARS-COV-2 RNA RESP QL NAA+PROBE: NOT DETECTED
SODIUM SERPL-SCNC: 134 MMOL/L (ref 132–146)
SOURCE: ABNORMAL
SPECIMEN DESCRIPTION: ABNORMAL
TROPONIN I SERPL HS-MCNC: <6 NG/L (ref 0–11)
TROPONIN I SERPL HS-MCNC: <6 NG/L (ref 0–11)
WBC OTHER # BLD: 8.7 K/UL (ref 4.5–11.5)

## 2025-02-04 PROCEDURE — 80048 BASIC METABOLIC PNL TOTAL CA: CPT

## 2025-02-04 PROCEDURE — 93005 ELECTROCARDIOGRAM TRACING: CPT | Performed by: NURSE PRACTITIONER

## 2025-02-04 PROCEDURE — 99285 EMERGENCY DEPT VISIT HI MDM: CPT

## 2025-02-04 PROCEDURE — 71046 X-RAY EXAM CHEST 2 VIEWS: CPT

## 2025-02-04 PROCEDURE — 84484 ASSAY OF TROPONIN QUANT: CPT

## 2025-02-04 PROCEDURE — 6370000000 HC RX 637 (ALT 250 FOR IP): Performed by: NURSE PRACTITIONER

## 2025-02-04 PROCEDURE — 87636 SARSCOV2 & INF A&B AMP PRB: CPT

## 2025-02-04 PROCEDURE — 82962 GLUCOSE BLOOD TEST: CPT

## 2025-02-04 PROCEDURE — 85025 COMPLETE CBC W/AUTO DIFF WBC: CPT

## 2025-02-04 PROCEDURE — 85379 FIBRIN DEGRADATION QUANT: CPT

## 2025-02-04 RX ORDER — IBUPROFEN 400 MG/1
600 TABLET, FILM COATED ORAL ONCE
Status: COMPLETED | OUTPATIENT
Start: 2025-02-04 | End: 2025-02-04

## 2025-02-04 RX ORDER — ACETAMINOPHEN 325 MG/1
650 TABLET ORAL ONCE
Status: COMPLETED | OUTPATIENT
Start: 2025-02-04 | End: 2025-02-04

## 2025-02-04 RX ADMIN — IBUPROFEN 600 MG: 400 TABLET, FILM COATED ORAL at 16:30

## 2025-02-04 RX ADMIN — ACETAMINOPHEN 650 MG: 325 TABLET ORAL at 16:30

## 2025-02-04 ASSESSMENT — LIFESTYLE VARIABLES
HOW OFTEN DO YOU HAVE A DRINK CONTAINING ALCOHOL: NEVER
HOW MANY STANDARD DRINKS CONTAINING ALCOHOL DO YOU HAVE ON A TYPICAL DAY: PATIENT DOES NOT DRINK

## 2025-02-04 ASSESSMENT — PAIN - FUNCTIONAL ASSESSMENT: PAIN_FUNCTIONAL_ASSESSMENT: NONE - DENIES PAIN

## 2025-02-04 NOTE — ED PROVIDER NOTES
Independent CANELO Visit.      [facility]  Department of Emergency Medicine   ED  Encounter Note  Admit Date/RoomTime: 2025 11:29 AM  ED Room: [facility]    NAME: Aba Almendarez  : 1975  MRN: [id number]     Chief Complaint:  Cold Symptoms (Patient states he has a head cold and has been sweating x 4 days. )    History of Present Illness       Aba Almendarez is a 49 y.o. old male who presents to the emergency department by private vehicle, for head congestion, cough, feeling febrile, mild body aches, headache and feeling short of breath for the past 4 days.  He said the shortness of breath is worse in the evenings.  He has been using his albuterol inhaler with relief.  Denies fevers, chest pain, abdominal pain, nausea, vomiting.  He was experiencing a couple episodes of diarrhea. He does not recall being around anyone that has been sick.     ROS   Pertinent positives and negatives are stated within HPI, all other systems reviewed and are negative.    Past Medical History:  has a past medical history of Bimalleolar fracture of left ankle.    Surgical History:  has a past surgical history that includes Ankle fracture surgery (Left, ) and fracture surgery (13).    Social History:  reports that he quit smoking about 2 years ago. His smoking use included cigars and cigarettes. He started smoking about 7 years ago. He has a 2.5 pack-year smoking history. He has never used smokeless tobacco. He reports that he does not drink alcohol and does not use drugs.    Family History: family history is not on file.     Allergies: Patient has no known allergies.    Physical Exam   Oxygen Saturation Interpretation: Normal on room air analysis.        ED Triage Vitals   BP Systolic BP Percentile Diastolic BP Percentile Temp Temp Source Pulse Respirations SpO2   25 1129 -- -- 25 1115 25 1115 25 1115 25 1115 25 1115   131/81   97.8 °F (36.6 °C) Oral (!) 109

## 2025-02-04 NOTE — DISCHARGE INSTRUCTIONS
ALTERNATE TYLENOL AND IBUPROFEN FOR FEVER AND BODY ACHES.  DRINK PLENTY OF FLUIDS TO STAY WELL HYDRATED.  FOLLOW UP WITH YOUR PRIMARY DOCTOR FOR RE-EVALUATION.  RETURN FOR WORSENING SYMPTOMS.

## 2025-02-05 LAB
EKG ATRIAL RATE: 91 BPM
EKG P-R INTERVAL: 158 MS
EKG Q-T INTERVAL: 430 MS
EKG QRS DURATION: 118 MS
EKG QTC CALCULATION (BAZETT): 528 MS
EKG R AXIS: 85 DEGREES
EKG T AXIS: -92 DEGREES
EKG VENTRICULAR RATE: 91 BPM

## 2025-02-05 PROCEDURE — 93010 ELECTROCARDIOGRAM REPORT: CPT | Performed by: INTERNAL MEDICINE

## 2025-08-20 ENCOUNTER — HOSPITAL ENCOUNTER (EMERGENCY)
Age: 50
Discharge: HOME | End: 2025-08-20

## 2025-08-20 ENCOUNTER — APPOINTMENT (OUTPATIENT)
Dept: GENERAL RADIOLOGY | Age: 50
End: 2025-08-20

## 2025-08-20 VITALS
HEART RATE: 69 BPM | TEMPERATURE: 98.5 F | BODY MASS INDEX: 22.46 KG/M2 | DIASTOLIC BLOOD PRESSURE: 75 MMHG | HEIGHT: 74 IN | RESPIRATION RATE: 19 BRPM | OXYGEN SATURATION: 99 % | SYSTOLIC BLOOD PRESSURE: 147 MMHG | WEIGHT: 175 LBS

## 2025-08-20 DIAGNOSIS — S39.012A LUMBAR STRAIN, INITIAL ENCOUNTER: ICD-10-CM

## 2025-08-20 DIAGNOSIS — R21 RASH AND OTHER NONSPECIFIC SKIN ERUPTION: Primary | ICD-10-CM

## 2025-08-20 PROCEDURE — 96372 THER/PROPH/DIAG INJ SC/IM: CPT

## 2025-08-20 PROCEDURE — 6370000000 HC RX 637 (ALT 250 FOR IP): Performed by: NURSE PRACTITIONER

## 2025-08-20 PROCEDURE — 99284 EMERGENCY DEPT VISIT MOD MDM: CPT

## 2025-08-20 PROCEDURE — 6360000002 HC RX W HCPCS: Performed by: NURSE PRACTITIONER

## 2025-08-20 PROCEDURE — 72100 X-RAY EXAM L-S SPINE 2/3 VWS: CPT

## 2025-08-20 RX ORDER — HYDROXYZINE HYDROCHLORIDE 25 MG/1
25 TABLET, FILM COATED ORAL EVERY 8 HOURS PRN
Qty: 30 TABLET | Refills: 0 | Status: SHIPPED | OUTPATIENT
Start: 2025-08-20 | End: 2025-08-30

## 2025-08-20 RX ORDER — TRIAMCINOLONE ACETONIDE 5 MG/G
CREAM TOPICAL
Qty: 45 G | Refills: 1 | Status: SHIPPED | OUTPATIENT
Start: 2025-08-20 | End: 2025-08-27

## 2025-08-20 RX ORDER — HYDROXYZINE HYDROCHLORIDE 50 MG/ML
50 INJECTION, SOLUTION INTRAMUSCULAR ONCE
Status: COMPLETED | OUTPATIENT
Start: 2025-08-20 | End: 2025-08-20

## 2025-08-20 RX ORDER — HYDROCODONE BITARTRATE AND ACETAMINOPHEN 5; 325 MG/1; MG/1
1 TABLET ORAL ONCE
Status: COMPLETED | OUTPATIENT
Start: 2025-08-20 | End: 2025-08-20

## 2025-08-20 RX ORDER — DEXAMETHASONE SODIUM PHOSPHATE 10 MG/ML
10 INJECTION, SOLUTION INTRA-ARTICULAR; INTRALESIONAL; INTRAMUSCULAR; INTRAVENOUS; SOFT TISSUE ONCE
Status: COMPLETED | OUTPATIENT
Start: 2025-08-20 | End: 2025-08-20

## 2025-08-20 RX ORDER — NAPROXEN 500 MG/1
500 TABLET ORAL 2 TIMES DAILY PRN
Qty: 28 TABLET | Refills: 0 | Status: SHIPPED | OUTPATIENT
Start: 2025-08-20

## 2025-08-20 RX ORDER — PREDNISONE 10 MG/1
TABLET ORAL
Qty: 20 TABLET | Refills: 0 | Status: SHIPPED | OUTPATIENT
Start: 2025-08-20 | End: 2025-08-30

## 2025-08-20 RX ADMIN — HYDROCODONE BITARTRATE AND ACETAMINOPHEN 1 TABLET: 5; 325 TABLET ORAL at 01:38

## 2025-08-20 RX ADMIN — HYDROXYZINE HYDROCHLORIDE 50 MG: 50 INJECTION, SOLUTION INTRAMUSCULAR at 01:38

## 2025-08-20 RX ADMIN — DEXAMETHASONE SODIUM PHOSPHATE 10 MG: 10 INJECTION INTRAMUSCULAR; INTRAVENOUS at 01:37

## 2025-08-20 ASSESSMENT — LIFESTYLE VARIABLES
HOW MANY STANDARD DRINKS CONTAINING ALCOHOL DO YOU HAVE ON A TYPICAL DAY: PATIENT DOES NOT DRINK
HOW OFTEN DO YOU HAVE A DRINK CONTAINING ALCOHOL: NEVER

## 2025-08-20 ASSESSMENT — PAIN SCALES - GENERAL: PAINLEVEL_OUTOF10: 0

## 2025-08-20 ASSESSMENT — PAIN - FUNCTIONAL ASSESSMENT: PAIN_FUNCTIONAL_ASSESSMENT: 0-10
